# Patient Record
Sex: FEMALE | Race: WHITE | NOT HISPANIC OR LATINO | Employment: OTHER | URBAN - METROPOLITAN AREA
[De-identification: names, ages, dates, MRNs, and addresses within clinical notes are randomized per-mention and may not be internally consistent; named-entity substitution may affect disease eponyms.]

---

## 2017-09-29 ENCOUNTER — HOSPITAL ENCOUNTER (OUTPATIENT)
Dept: CARDIOLOGY | Facility: CLINIC | Age: 82
Discharge: HOME | End: 2017-09-29
Attending: INTERNAL MEDICINE | Admitting: INTERNAL MEDICINE
Payer: MEDICARE

## 2017-09-29 DIAGNOSIS — I10 HYPERTENSION: ICD-10-CM

## 2017-09-29 LAB
ALT SERPL W P-5'-P-CCNC: <5 U/L (ref 5–30)
ANION GAP SERPL CALCULATED.3IONS-SCNC: 9.6 MMOL/L (ref 6–17)
BUN SERPL-MCNC: 20 MG/DL (ref 7–30)
CALCIUM SERPL-MCNC: 8.9 MG/DL (ref 8.5–10.5)
CHLORIDE SERPL-SCNC: 98 MMOL/L (ref 98–107)
CHOLEST SERPL-MCNC: 193 MG/DL
CO2 SERPL-SCNC: 32 MMOL/L (ref 23–29)
CREAT SERPL-MCNC: 1 MG/DL (ref 0.7–1.3)
GFR SERPL CREATININE-BSD FRML MDRD: 53 ML/MIN/1.7M2
GLUCOSE SERPL-MCNC: 94 MG/DL (ref 70–105)
HDLC SERPL-MCNC: 64 MG/DL
LDLC SERPL CALC-MCNC: 115 MG/DL
NONHDLC SERPL-MCNC: 129 MG/DL
POTASSIUM SERPL-SCNC: 4.6 MMOL/L (ref 3.5–5.1)
SODIUM SERPL-SCNC: 135 MMOL/L (ref 136–145)
TRIGL SERPL-MCNC: 70 MG/DL

## 2017-09-29 PROCEDURE — 36415 COLL VENOUS BLD VENIPUNCTURE: CPT | Performed by: INTERNAL MEDICINE

## 2017-09-29 PROCEDURE — 93306 TTE W/DOPPLER COMPLETE: CPT

## 2017-09-29 PROCEDURE — 80061 LIPID PANEL: CPT | Performed by: INTERNAL MEDICINE

## 2017-09-29 PROCEDURE — 84460 ALANINE AMINO (ALT) (SGPT): CPT | Performed by: INTERNAL MEDICINE

## 2017-09-29 PROCEDURE — 80048 BASIC METABOLIC PNL TOTAL CA: CPT | Performed by: INTERNAL MEDICINE

## 2017-09-29 PROCEDURE — 93306 TTE W/DOPPLER COMPLETE: CPT | Mod: 26 | Performed by: INTERNAL MEDICINE

## 2017-10-03 ENCOUNTER — OFFICE VISIT (OUTPATIENT)
Dept: CARDIOLOGY | Facility: CLINIC | Age: 82
End: 2017-10-03
Attending: INTERNAL MEDICINE
Payer: COMMERCIAL

## 2017-10-03 VITALS
BODY MASS INDEX: 22.53 KG/M2 | WEIGHT: 140.2 LBS | DIASTOLIC BLOOD PRESSURE: 76 MMHG | HEART RATE: 65 BPM | HEIGHT: 66 IN | SYSTOLIC BLOOD PRESSURE: 130 MMHG

## 2017-10-03 DIAGNOSIS — I10 ESSENTIAL HYPERTENSION: Primary | ICD-10-CM

## 2017-10-03 PROCEDURE — 99214 OFFICE O/P EST MOD 30 MIN: CPT | Performed by: INTERNAL MEDICINE

## 2017-10-03 NOTE — LETTER
"10/3/2017    Benjamin Chambers MD  [address] [address]    RE: Benita [place]       Dear Colleague,    I had the pleasure of seeing Benita Hubbard in the [facility].    The patient is a very pleasant 86-year-old female who I first met during a hospitalization in 04/2014.  At that time she had presented with chest pain with minimal troponin elevation.  She also was found to be significantly hypertensive.  Hypertension was a new diagnosis for the patient.  She was subsequently treated per ACS protocol due to her troponin elevation.  She was taken to the cath lab and fortunately was not found to have any significant obstructive coronary disease.  She did have a cardiomyopathy with an ejection fraction which was mildly reduced in the 40%-45% range with global hypokinesis.  She was placed on Metoprolol and lisinopril with recent up-titration.  She has done well since her discharge.  She has not had any further reoccurrences up her pain or other symptoms.  She is very active.  She bikes 10 miles a day weather permitting.  She also walks, gardens and does other activities without any difficulty at all.  The patient subsequently presents for a close followup.     Follow up echocardiogram with normalization of her EF.  Doing well, no return of symptoms.      Echo: 9/29/17    The visual ejection fraction is estimated at 50-55%.  \"L\" wave present on mitral inflow signal, this may indicate elevated LV  filling pressure. Other parameters of diastolic dysfxn are non diagnostic on  this study  Inferior wall not well seen and may be hypokinetic  There is mild to moderate (1-2+) tricuspid regurgitation.  The ascending aorta is Borderline dilated.      PAST MEDICAL HISTORY:     1. Nonischemic cardiomyopathy with recent echocardiogram with normalization of ejection fraction, low-normal 50%-55%.     2. Hypertension, new diagnosis.   3. 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   4. History of left hand surgery.   5. History of hysterectomy.      PHYSICAL EXAM:     Blood pressure 130/76, pulse 65, height 1.676 m (5' 6\"), weight 63.6 kg (140 lb 3.2 oz).  General:  The patient is alert and oriented x3, appears younger than stated age.    HEENT:  Oropharynx is clear, no sinus tenderness.    Neck:  No JVP, no lymphadenopathy, no carotid bruits.    Cardiovascular:  Regular rate and rhythm, normal S1 and S2, no murmurs, gallops or rubs.    Lungs:  Clear to auscultation bilaterally.    Abdomen:  Positive bowel sounds, soft, nontender, nondistended.    Extremities:  No clubbing, cyanosis or edema.  Radial pulses equal bilaterally.      ASSESSMENT:   1. Nonischemic cardiomyopathy with ejection fraction low-normal with recent improvement on medical therapy.   2. Cardiac cath in 04/2014 with minimal nonobstructive  coronary disease.   3. Hypertension, new diagnosis.  On dual therapy.   4. History of neuropathy.      RECOMMENDATIONS:     1. Nonischemic cardiomyopathy.  The patient is doing well.  She has had recent echocardiogram which has shown normalization of her ejection fraction.  She is tolerating her lisinopril and metoprolol without any difficulty.  We will continue these medications.   2. Atherosclerotic coronary artery disease.  Minimal obstructive disease was noted.  Probable hypertensive etiology as the cause of her presentation and mild cardiomyopathy which has since improved by most recent assessment.  Blood pressure is well controlled today.  Continue with secondary preventive measures as we are doing.   3. RTC in one year for a routine visit with a pre clinic echocardiogram      Thank you for allowing me to participate in the care of your patient.    Sincerely,     Adalgisa Rosario MD     [facility]    "

## 2017-10-03 NOTE — PROGRESS NOTES
"Cardiology    HISTORY OF PRESENT ILLNESS:  The patient is a very pleasant 86-year-old female who I first met during a hospitalization in 04/2014.  At that time she had presented with chest pain with minimal troponin elevation.  She also was found to be significantly hypertensive.  Hypertension was a new diagnosis for the patient.  She was subsequently treated per ACS protocol due to her troponin elevation.  She was taken to the cath lab and fortunately was not found to have any significant obstructive coronary disease.  She did have a cardiomyopathy with an ejection fraction which was mildly reduced in the 40%-45% range with global hypokinesis.  She was placed on Metoprolol and lisinopril with recent up-titration.  She has done well since her discharge.  She has not had any further reoccurrences up her pain or other symptoms.  She is very active.  She bikes 10 miles a day weather permitting.  She also walks, gardens and does other activities without any difficulty at all.  The patient subsequently presents for a close followup.     Follow up echocardiogram with normalization of her EF.  Doing well, no return of symptoms.      Echo: 9/29/17    The visual ejection fraction is estimated at 50-55%.  \"L\" wave present on mitral inflow signal, this may indicate elevated LV  filling pressure. Other parameters of diastolic dysfxn are non diagnostic on  this study  Inferior wall not well seen and may be hypokinetic  There is mild to moderate (1-2+) tricuspid regurgitation.  The ascending aorta is Borderline dilated.      PAST MEDICAL HISTORY:     1. Nonischemic cardiomyopathy with recent echocardiogram with normalization of ejection fraction, low-normal 50%-55%.     2. Hypertension, new diagnosis.   3. 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   4. History of left hand surgery.   5. History of hysterectomy.      PHYSICAL EXAM:     Blood pressure 130/76, pulse 65, height 1.676 m (5' 6\"), weight 63.6 kg " (140 lb 3.2 oz).  General:  The patient is alert and oriented x3, appears younger than stated age.    HEENT:  Oropharynx is clear, no sinus tenderness.    Neck:  No JVP, no lymphadenopathy, no carotid bruits.    Cardiovascular:  Regular rate and rhythm, normal S1 and S2, no murmurs, gallops or rubs.    Lungs:  Clear to auscultation bilaterally.    Abdomen:  Positive bowel sounds, soft, nontender, nondistended.    Extremities:  No clubbing, cyanosis or edema.  Radial pulses equal bilaterally.      ASSESSMENT:   1. Nonischemic cardiomyopathy with ejection fraction low-normal with recent improvement on medical therapy.   2. Cardiac cath in 04/2014 with minimal nonobstructive  coronary disease.   3. Hypertension, new diagnosis.  On dual therapy.   4. History of neuropathy.      RECOMMENDATIONS:     1. Nonischemic cardiomyopathy.  The patient is doing well.  She has had recent echocardiogram which has shown normalization of her ejection fraction.  She is tolerating her lisinopril and metoprolol without any difficulty.  We will continue these medications.   2. Atherosclerotic coronary artery disease.  Minimal obstructive disease was noted.  Probable hypertensive etiology as the cause of her presentation and mild cardiomyopathy which has since improved by most recent assessment.  Blood pressure is well controlled today.  Continue with secondary preventive measures as we are doing.   3. RTC in one year for a routine visit with a pre clinic echocardiogram        Adalgisa Rosario MD

## 2017-10-03 NOTE — MR AVS SNAPSHOT
"              After Visit Summary   10/3/2017    Benita Hubbard    MRN: [id number]           Patient Information     Date Of Birth          7/24/1931        Visit Information        Provider Department      10/3/2017 9:45 AM Adalgisa Rosario MD [facility] [place]        Today's Diagnoses     Essential hypertension    -  1       Follow-ups after your visit        Who to contact     If you have questions or need follow up information about today's clinic visit or your schedule please contact [facility] directly at [phone].  Normal or non-critical lab and imaging results will be communicated to you by [email or web address], letter or phone within 4 business days after the clinic has received the results. If you do not hear from us within 7 days, please contact the clinic through [employer] or phone. If you have a critical or abnormal lab result, we will notify you by phone as soon as possible.  Submit refill requests through [employer] or call your pharmacy and they will forward the refill request to us. Please allow 3 business days for your refill to be completed.          Additional Information About Your Visit        MyChart Information     [employer] gives you secure access to your electronic health record. If you see a primary care provider, you can also send messages to your care team and make appointments. If you have questions, please call your primary care clinic.  If you do not have a primary care provider, please call [phone] and they will assist you.        Care EveryWhere ID     This is your Care EveryWhere ID. This could be used by other organizations to access your [place] medical records  [id number]        Your Vitals Were     Pulse Height BMI (Body Mass Index)             65 1.676 m (5' 6\") 22.63 kg/m2          Blood Pressure from Last 3 Encounters:   10/03/17 130/76   10/05/16 110/64   09/21/15 126/70    Weight from Last 3 " Encounters:   10/03/17 63.6 kg (140 lb 3.2 oz)   10/05/16 65.8 kg (145 lb)   09/21/15 65.3 kg (144 lb)              Today, you had the following     No orders found for display       Primary Care Provider Office Phone # Fax #    [place] Rusty Chambers [phone]       [place] [address]        Equal Access to Services     RODDY NOLAND : Hadii aad ku hadasho Soomaali, waaxda luqadaha, qaybta kaalmada adeegyada, waxay idiin hayaan adeeg kharash la'nancy . So [facility] [phone].    ATENCIÓN: Si habla español, tiene a starr disposición servicios gratuitos de asistencia lingüística. [facility] [phone].    We comply with applicable federal civil rights laws and [place] laws. We do not discriminate on the basis of race, color, national origin, age, disability, sex, sexual orientation, or gender identity.            Thank you!     Thank you for choosing [facility] [place]  for your care. Our goal is always to provide you with excellent care. Hearing back from our patients is one way we can continue to improve our services. Please take a few minutes to complete the written survey that you may receive in the mail after your visit with us. Thank you!             Your Updated Medication List - Protect others around you: Learn how to safely use, store and throw away your medicines at www.disposemymeds.org.          This list is accurate as of: 10/3/17 10:47 AM.  Always use your most recent med list.                   Brand Name Dispense Instructions for use Diagnosis    alendronate 40 MG Tabs    FOSAMAX     Take 35 mg by mouth once a week        calcium-vitamin D 600-400 MG-UNIT per tablet    CALTRATE     Take 1 tablet by mouth daily        lisinopril 5 MG tablet    PRINIVIL/ZESTRIL    90 tablet    Take 1 tablet (5 mg) by mouth daily    Hypertensive urgency       LYRICA 75 MG capsule   Generic drug:  pregabalin      Take 75 mg  by mouth daily        metoprolol 50 MG 24 hr tablet    TOPROL-XL     Take 50 mg by mouth daily        mometasone 50 MCG/ACT spray    NASONEX     Spray 2 sprays into both nostrils daily as needed        vitamin B complex with vitamin C Tabs tablet      Take 1 tablet by mouth daily        VITAMIN D3 PO      Take 1,000 Units by mouth daily

## 2019-09-29 ENCOUNTER — HEALTH MAINTENANCE LETTER (OUTPATIENT)
Age: 84
End: 2019-09-29

## 2020-03-15 ENCOUNTER — HEALTH MAINTENANCE LETTER (OUTPATIENT)
Age: 85
End: 2020-03-15

## 2021-01-14 ENCOUNTER — HEALTH MAINTENANCE LETTER (OUTPATIENT)
Age: 86
End: 2021-01-14

## 2021-05-09 ENCOUNTER — HEALTH MAINTENANCE LETTER (OUTPATIENT)
Age: 86
End: 2021-05-09

## 2021-09-19 ENCOUNTER — APPOINTMENT (OUTPATIENT)
Dept: CT IMAGING | Facility: CLINIC | Age: 86
End: 2021-09-19
Attending: EMERGENCY MEDICINE
Payer: MEDICARE

## 2021-09-19 ENCOUNTER — HOSPITAL ENCOUNTER (OUTPATIENT)
Facility: CLINIC | Age: 86
Setting detail: OBSERVATION
Discharge: HOME | End: 2021-09-20
Attending: EMERGENCY MEDICINE | Admitting: EMERGENCY MEDICINE
Payer: MEDICARE

## 2021-09-19 DIAGNOSIS — I10 ESSENTIAL HYPERTENSION: Primary | ICD-10-CM

## 2021-09-19 DIAGNOSIS — S09.90XA CLOSED HEAD INJURY, INITIAL ENCOUNTER: ICD-10-CM

## 2021-09-19 DIAGNOSIS — I42.8 CARDIOMYOPATHY, NONISCHEMIC (H): ICD-10-CM

## 2021-09-19 DIAGNOSIS — S22.20XA CLOSED FRACTURE OF STERNUM, UNSPECIFIED PORTION OF STERNUM, INITIAL ENCOUNTER: ICD-10-CM

## 2021-09-19 DIAGNOSIS — S22.040A CLOSED WEDGE COMPRESSION FRACTURE OF T4 VERTEBRA, INITIAL ENCOUNTER (H): ICD-10-CM

## 2021-09-19 LAB
ANION GAP SERPL CALCULATED.3IONS-SCNC: 2 MMOL/L (ref 3–14)
ATRIAL RATE - MUSE: 56 BPM
BASOPHILS # BLD AUTO: 0 10E3/UL (ref 0–0.2)
BASOPHILS NFR BLD AUTO: 0 %
BUN SERPL-MCNC: 21 MG/DL (ref 7–30)
CALCIUM SERPL-MCNC: 8.7 MG/DL (ref 8.5–10.1)
CHLORIDE BLD-SCNC: 99 MMOL/L (ref 94–109)
CO2 SERPL-SCNC: 30 MMOL/L (ref 20–32)
CREAT SERPL-MCNC: 0.98 MG/DL (ref 0.52–1.04)
DIASTOLIC BLOOD PRESSURE - MUSE: NORMAL MMHG
EOSINOPHIL # BLD AUTO: 0.1 10E3/UL (ref 0–0.7)
EOSINOPHIL NFR BLD AUTO: 1 %
ERYTHROCYTE [DISTWIDTH] IN BLOOD BY AUTOMATED COUNT: 13.3 % (ref 10–15)
GFR SERPL CREATININE-BSD FRML MDRD: 51 ML/MIN/1.73M2
GLUCOSE BLD-MCNC: 97 MG/DL (ref 70–99)
HCT VFR BLD AUTO: 39.2 % (ref 35–47)
HGB BLD-MCNC: 12.6 G/DL (ref 11.7–15.7)
IMM GRANULOCYTES # BLD: 0.1 10E3/UL
IMM GRANULOCYTES NFR BLD: 1 %
INTERPRETATION ECG - MUSE: NORMAL
LYMPHOCYTES # BLD AUTO: 0.8 10E3/UL (ref 0.8–5.3)
LYMPHOCYTES NFR BLD AUTO: 11 %
MCH RBC QN AUTO: 29.9 PG (ref 26.5–33)
MCHC RBC AUTO-ENTMCNC: 32.1 G/DL (ref 31.5–36.5)
MCV RBC AUTO: 93 FL (ref 78–100)
MONOCYTES # BLD AUTO: 0.5 10E3/UL (ref 0–1.3)
MONOCYTES NFR BLD AUTO: 7 %
NEUTROPHILS # BLD AUTO: 5.7 10E3/UL (ref 1.6–8.3)
NEUTROPHILS NFR BLD AUTO: 80 %
NRBC # BLD AUTO: 0 10E3/UL
NRBC BLD AUTO-RTO: 0 /100
P AXIS - MUSE: 73 DEGREES
PLATELET # BLD AUTO: 156 10E3/UL (ref 150–450)
POTASSIUM BLD-SCNC: 4.6 MMOL/L (ref 3.4–5.3)
PR INTERVAL - MUSE: 152 MS
QRS DURATION - MUSE: 84 MS
QT - MUSE: 422 MS
QTC - MUSE: 407 MS
R AXIS - MUSE: -18 DEGREES
RBC # BLD AUTO: 4.21 10E6/UL (ref 3.8–5.2)
SARS-COV-2 RNA RESP QL NAA+PROBE: NEGATIVE
SODIUM SERPL-SCNC: 131 MMOL/L (ref 133–144)
SYSTOLIC BLOOD PRESSURE - MUSE: NORMAL MMHG
T AXIS - MUSE: 2 DEGREES
TROPONIN I SERPL-MCNC: <0.015 UG/L (ref 0–0.04)
VENTRICULAR RATE- MUSE: 56 BPM
WBC # BLD AUTO: 7.1 10E3/UL (ref 4–11)

## 2021-09-19 PROCEDURE — 250N000011 HC RX IP 250 OP 636: Performed by: EMERGENCY MEDICINE

## 2021-09-19 PROCEDURE — 87635 SARS-COV-2 COVID-19 AMP PRB: CPT | Performed by: EMERGENCY MEDICINE

## 2021-09-19 PROCEDURE — 36415 COLL VENOUS BLD VENIPUNCTURE: CPT | Performed by: EMERGENCY MEDICINE

## 2021-09-19 PROCEDURE — C9803 HOPD COVID-19 SPEC COLLECT: HCPCS

## 2021-09-19 PROCEDURE — 250N000013 HC RX MED GY IP 250 OP 250 PS 637: Performed by: INTERNAL MEDICINE

## 2021-09-19 PROCEDURE — 250N000011 HC RX IP 250 OP 636: Performed by: INTERNAL MEDICINE

## 2021-09-19 PROCEDURE — 258N000003 HC RX IP 258 OP 636: Performed by: INTERNAL MEDICINE

## 2021-09-19 PROCEDURE — 99285 EMERGENCY DEPT VISIT HI MDM: CPT | Mod: 25

## 2021-09-19 PROCEDURE — 71260 CT THORAX DX C+: CPT | Mod: ME

## 2021-09-19 PROCEDURE — 96376 TX/PRO/DX INJ SAME DRUG ADON: CPT

## 2021-09-19 PROCEDURE — 96374 THER/PROPH/DIAG INJ IV PUSH: CPT

## 2021-09-19 PROCEDURE — 70450 CT HEAD/BRAIN W/O DYE: CPT | Mod: ME

## 2021-09-19 PROCEDURE — 250N000009 HC RX 250: Performed by: EMERGENCY MEDICINE

## 2021-09-19 PROCEDURE — G0378 HOSPITAL OBSERVATION PER HR: HCPCS

## 2021-09-19 PROCEDURE — 84484 ASSAY OF TROPONIN QUANT: CPT | Performed by: EMERGENCY MEDICINE

## 2021-09-19 PROCEDURE — 99220 PR INITIAL OBSERVATION CARE,LEVEL III: CPT | Performed by: INTERNAL MEDICINE

## 2021-09-19 PROCEDURE — 85025 COMPLETE CBC W/AUTO DIFF WBC: CPT | Performed by: EMERGENCY MEDICINE

## 2021-09-19 PROCEDURE — 80048 BASIC METABOLIC PNL TOTAL CA: CPT | Performed by: EMERGENCY MEDICINE

## 2021-09-19 PROCEDURE — 93005 ELECTROCARDIOGRAM TRACING: CPT

## 2021-09-19 PROCEDURE — 72125 CT NECK SPINE W/O DYE: CPT | Mod: ME

## 2021-09-19 RX ORDER — ACETAMINOPHEN 325 MG/1
975 TABLET ORAL EVERY 8 HOURS SCHEDULED
Status: DISCONTINUED | OUTPATIENT
Start: 2021-09-19 | End: 2021-09-20 | Stop reason: HOSPADM

## 2021-09-19 RX ORDER — NALOXONE HYDROCHLORIDE 0.4 MG/ML
0.2 INJECTION, SOLUTION INTRAMUSCULAR; INTRAVENOUS; SUBCUTANEOUS
Status: DISCONTINUED | OUTPATIENT
Start: 2021-09-19 | End: 2021-09-20 | Stop reason: HOSPADM

## 2021-09-19 RX ORDER — POLYETHYLENE GLYCOL 3350 17 G/17G
17 POWDER, FOR SOLUTION ORAL DAILY PRN
Status: DISCONTINUED | OUTPATIENT
Start: 2021-09-19 | End: 2021-09-20 | Stop reason: HOSPADM

## 2021-09-19 RX ORDER — HYDROMORPHONE HCL IN WATER/PF 6 MG/30 ML
0.2 PATIENT CONTROLLED ANALGESIA SYRINGE INTRAVENOUS
Status: DISCONTINUED | OUTPATIENT
Start: 2021-09-19 | End: 2021-09-20 | Stop reason: HOSPADM

## 2021-09-19 RX ORDER — METOPROLOL SUCCINATE 25 MG/1
25 TABLET, EXTENDED RELEASE ORAL DAILY
Status: DISCONTINUED | OUTPATIENT
Start: 2021-09-20 | End: 2021-09-20 | Stop reason: HOSPADM

## 2021-09-19 RX ORDER — PREGABALIN 50 MG/1
50 CAPSULE ORAL EVERY EVENING
COMMUNITY

## 2021-09-19 RX ORDER — LISINOPRIL 5 MG/1
5 TABLET ORAL DAILY
Status: DISCONTINUED | OUTPATIENT
Start: 2021-09-20 | End: 2021-09-20 | Stop reason: HOSPADM

## 2021-09-19 RX ORDER — PREGABALIN 50 MG/1
50 CAPSULE ORAL EVERY EVENING
Status: DISCONTINUED | OUTPATIENT
Start: 2021-09-19 | End: 2021-09-20 | Stop reason: HOSPADM

## 2021-09-19 RX ORDER — PROCHLORPERAZINE MALEATE 5 MG
5 TABLET ORAL EVERY 6 HOURS PRN
Status: DISCONTINUED | OUTPATIENT
Start: 2021-09-19 | End: 2021-09-20 | Stop reason: HOSPADM

## 2021-09-19 RX ORDER — PROCHLORPERAZINE 25 MG
12.5 SUPPOSITORY, RECTAL RECTAL EVERY 12 HOURS PRN
Status: DISCONTINUED | OUTPATIENT
Start: 2021-09-19 | End: 2021-09-20 | Stop reason: HOSPADM

## 2021-09-19 RX ORDER — ONDANSETRON 2 MG/ML
4 INJECTION INTRAMUSCULAR; INTRAVENOUS EVERY 6 HOURS PRN
Status: DISCONTINUED | OUTPATIENT
Start: 2021-09-19 | End: 2021-09-20 | Stop reason: HOSPADM

## 2021-09-19 RX ORDER — HYDROMORPHONE HCL IN WATER/PF 6 MG/30 ML
0.2 PATIENT CONTROLLED ANALGESIA SYRINGE INTRAVENOUS
Status: COMPLETED | OUTPATIENT
Start: 2021-09-19 | End: 2021-09-19

## 2021-09-19 RX ORDER — NALOXONE HYDROCHLORIDE 0.4 MG/ML
0.4 INJECTION, SOLUTION INTRAMUSCULAR; INTRAVENOUS; SUBCUTANEOUS
Status: DISCONTINUED | OUTPATIENT
Start: 2021-09-19 | End: 2021-09-20 | Stop reason: HOSPADM

## 2021-09-19 RX ORDER — IOPAMIDOL 755 MG/ML
80 INJECTION, SOLUTION INTRAVASCULAR ONCE
Status: COMPLETED | OUTPATIENT
Start: 2021-09-19 | End: 2021-09-19

## 2021-09-19 RX ORDER — AMOXICILLIN 250 MG
2 CAPSULE ORAL 2 TIMES DAILY PRN
Status: DISCONTINUED | OUTPATIENT
Start: 2021-09-19 | End: 2021-09-20 | Stop reason: HOSPADM

## 2021-09-19 RX ORDER — ONDANSETRON 4 MG/1
4 TABLET, ORALLY DISINTEGRATING ORAL EVERY 6 HOURS PRN
Status: DISCONTINUED | OUTPATIENT
Start: 2021-09-19 | End: 2021-09-20 | Stop reason: HOSPADM

## 2021-09-19 RX ORDER — LIDOCAINE 4 G/G
1 PATCH TOPICAL
Status: DISCONTINUED | OUTPATIENT
Start: 2021-09-20 | End: 2021-09-20 | Stop reason: HOSPADM

## 2021-09-19 RX ORDER — SODIUM CHLORIDE 9 MG/ML
INJECTION, SOLUTION INTRAVENOUS CONTINUOUS
Status: ACTIVE | OUTPATIENT
Start: 2021-09-19 | End: 2021-09-19

## 2021-09-19 RX ORDER — CHOLECALCIFEROL (VITAMIN D3) 1250 MCG
25 CAPSULE ORAL DAILY
Status: DISCONTINUED | OUTPATIENT
Start: 2021-09-19 | End: 2021-09-19

## 2021-09-19 RX ORDER — AMOXICILLIN 250 MG
1 CAPSULE ORAL 2 TIMES DAILY PRN
Status: DISCONTINUED | OUTPATIENT
Start: 2021-09-19 | End: 2021-09-20 | Stop reason: HOSPADM

## 2021-09-19 RX ORDER — CALCITONIN SALMON 200 [IU]/.09ML
1 SPRAY, METERED NASAL DAILY
Status: DISCONTINUED | OUTPATIENT
Start: 2021-09-19 | End: 2021-09-19

## 2021-09-19 RX ORDER — ALENDRONATE SODIUM 70 MG/1
70 TABLET ORAL
COMMUNITY

## 2021-09-19 RX ORDER — CYCLOBENZAPRINE HCL 5 MG
5 TABLET ORAL EVERY 8 HOURS PRN
Status: DISCONTINUED | OUTPATIENT
Start: 2021-09-19 | End: 2021-09-20 | Stop reason: HOSPADM

## 2021-09-19 RX ADMIN — PREGABALIN 50 MG: 50 CAPSULE ORAL at 19:47

## 2021-09-19 RX ADMIN — SODIUM CHLORIDE: 9 INJECTION, SOLUTION INTRAVENOUS at 15:00

## 2021-09-19 RX ADMIN — HYDROMORPHONE HYDROCHLORIDE 0.2 MG: 0.2 INJECTION, SOLUTION INTRAMUSCULAR; INTRAVENOUS; SUBCUTANEOUS at 15:00

## 2021-09-19 RX ADMIN — OXYCODONE HYDROCHLORIDE 2.5 MG: 5 TABLET ORAL at 19:19

## 2021-09-19 RX ADMIN — SODIUM CHLORIDE 80 ML: 9 INJECTION, SOLUTION INTRAVENOUS at 11:34

## 2021-09-19 RX ADMIN — ACETAMINOPHEN 975 MG: 325 TABLET, FILM COATED ORAL at 23:25

## 2021-09-19 RX ADMIN — HYDROMORPHONE HYDROCHLORIDE 0.2 MG: 0.2 INJECTION, SOLUTION INTRAMUSCULAR; INTRAVENOUS; SUBCUTANEOUS at 10:31

## 2021-09-19 RX ADMIN — IOPAMIDOL 80 ML: 755 INJECTION, SOLUTION INTRAVENOUS at 11:33

## 2021-09-19 RX ADMIN — ACETAMINOPHEN 975 MG: 325 TABLET, FILM COATED ORAL at 18:20

## 2021-09-19 ASSESSMENT — ENCOUNTER SYMPTOMS
NECK PAIN: 0
PALPITATIONS: 0
LIGHT-HEADEDNESS: 1
ARTHRALGIAS: 0
DIZZINESS: 0
ABDOMINAL PAIN: 0
BACK PAIN: 1

## 2021-09-19 ASSESSMENT — MIFFLIN-ST. JEOR: SCORE: 1024.16

## 2021-09-19 NOTE — PROGRESS NOTES
Spine consult received.    Imaging shows T4 compression fracture, suspect chronic based on increased cortical density.    No unstable injuries in T or C spine based on CT imaging.    From spine standpoint:  - No spine precautions necessary  - WBAT   - No bracing indicated  - No intervention planned    Currently completed charting does not demonstrate a neurologic examination of the extremities, though low suspicion that a deficit would be present based on imaging.  If a new motor deficit present please call and will see tonight.    Full consult will follow tomorrow.    Kennedy Pennington MD  Orthopaedic Spine Surgery  [facility]

## 2021-09-19 NOTE — ED NOTES
Bed: ED01  Expected date:   Expected time:   Means of arrival:   Comments:  Sheron 5111 fall rib injury 89f

## 2021-09-19 NOTE — ED NOTES
[facility]  ED Nurse Handoff Report    ED Chief complaint: Fall      ED Diagnosis:   Final diagnoses:   Closed fracture of sternum, unspecified portion of sternum, initial encounter   Closed head injury, initial encounter   Closed wedge compression fracture of T4 vertebra, initial encounter (H)       Code Status: Full Code    Allergies:   Allergies   Allergen Reactions     Cephalexin Other (See Comments)     Dizzy and chest tightness     Ciprofloxacin Other (See Comments)     Neuropathy bottom of feet       Nitrofurantoin Other (See Comments)     Neuropathy bottom of feet     Ofloxacin GI Disturbance     Numbness     Sulfa Drugs      Coated tongue       Patient Story: Pt presents to the ER via EMS with c/o a fall. Per EMS report pt has balance issues at baseline, this am pt stepped wrong and fell backwards. Denies hitting head or a loc.   Focused Assessment: Pt c/o pain with deep breaths and upper back pain. Pt up with Ax1, pt A&Ox4.Pt lives with her  at home. The pt denies any dizziness or palpitations before falling. also denies neck pain.  Results for orders placed or performed during the hospital encounter of 09/19/21   CT Head w/o Contrast     Status: None    Narrative    EXAM: CT HEAD WITHOUT CONTRAST  LOCATION: [facility]  DATE/TIME: 09/19/2021, 11:27 AM    INDICATION: Headache, intracranial hemorrhage suspected.  COMPARISON: None.  TECHNIQUE: Routine CT Head without IV contrast. Multiplanar reformats. Dose reduction techniques were used.    FINDINGS:  INTRACRANIAL CONTENTS: No intracranial hemorrhage, extra-axial collection, or mass effect.  No CT evidence of acute infarct. Mild presumed chronic small vessel ischemic changes. Mild generalized volume loss. No hydrocephalus.     VISUALIZED ORBITS/SINUSES/MASTOIDS: No intraorbital abnormality. No paranasal sinus mucosal disease. No middle ear or mastoid effusion.    BONES/SOFT TISSUES: No acute abnormality.       Impression    IMPRESSION:  1.  No CT evidence for acute intracranial process.  2.  Brain atrophy and presumed chronic microvascular ischemic changes as above.     CT Cervical Spine w/o Contrast     Status: None    Narrative    EXAM: CT CERVICAL SPINE W/O CONTRAST  LOCATION: [facility]  DATE/TIME: 9/19/2021 11:28 AM    INDICATION: Neck trauma (age >= 65y), pain.  COMPARISON: None.  TECHNIQUE: Routine CT Cervical Spine without IV contrast. Multiplanar reformats. Dose reduction techniques were used.    FINDINGS:  VERTEBRA: Mild degenerative anterolisthesis of C4 upon C5, C6 upon C7, and C7 upon T1. Alignment otherwise normal. Vertebral body heights normal. No fractures. Moderate facet arthropathy throughout the cervical spine. Loss of disc height and degenerative   endplate spurring at C5-C6 and C6-C7.     CANAL/FORAMINA: No canal or neural foraminal stenosis.    PARASPINAL: No extraspinal abnormality.      Impression    IMPRESSION:  1.  No fracture or posttraumatic subluxation.  2.  No high-grade spinal canal or neural foraminal stenosis.   Chest CT w IV contrast only, TRAUMA / DISSECTION     Status: None    Narrative    EXAM: CT CHEST W CONTRAST  LOCATION: [facility]  DATE/TIME: 9/19/2021 11:26 AM    INDICATION: Chest pain in right rib injury and pain.  COMPARISON: None.  TECHNIQUE: CT chest with IV contrast. Multiplanar reformats were obtained. Dose reduction techniques were used.  CONTRAST: 80 mL Isovue-370.    FINDINGS:   LUNGS AND PLEURA: Mild basilar predominant atelectasis and scarring / fibrosis. Mild basilar predominant traction bronchiectasis. No consolidation or contusion. No pleural effusion or pneumothorax.    MEDIASTINUM/AXILLAE: No adenopathy. No pericardial effusion. No mediastinal hematoma. Nonaneurysmal aorta without acute traumatic abnormality. Mild pulmonary trunk enlargement at 3.1 cm; correlate for possibility of pulmonary  hypertension.    CORONARY ARTERY CALCIFICATION: Mild.    UPPER ABDOMEN: Nothing acute.    MUSCULOSKELETAL: Motion artifact with upper one third sternal fracture (sagittal series image 54). The more superior sternum demonstrates slight posterior displacement and overlap (1 to 2 mm). Subtle anterior left fifth rib cortical irregularity (series   5, image 214). Age-indeterminate moderate-severe central compression T4, as well as minimal-mild T5 and T6 compression. Perhaps marginal height loss at T7 and T8. No other definitive fracture. Bony demineralization and degenerative changes.      Impression    IMPRESSION:     1.  Acute appearing superior sternal fracture with slight displacement.    2.  Nondisplaced and age-indeterminate anterior left fifth rib fracture.    3.  Additional age-indeterminate vertebral compression fractures, up to moderate-severe at T4 centrally.    4.  No other acute traumatic abnormality. Bony demineralization.    5.  Please see report for complete detail.         Basic metabolic panel     Status: Abnormal   Result Value Ref Range    Sodium 131 (L) 133 - 144 mmol/L    Potassium 4.6 3.4 - 5.3 mmol/L    Chloride 99 94 - 109 mmol/L    Carbon Dioxide (CO2) 30 20 - 32 mmol/L    Anion Gap 2 (L) 3 - 14 mmol/L    Urea Nitrogen 21 7 - 30 mg/dL    Creatinine 0.98 0.52 - 1.04 mg/dL    Calcium 8.7 8.5 - 10.1 mg/dL    Glucose 97 70 - 99 mg/dL    GFR Estimate 51 (L) >60 mL/min/1.73m2   Troponin I     Status: Normal   Result Value Ref Range    Troponin I <0.015 0.000 - 0.045 ug/L   CBC with platelets and differential     Status: Abnormal   Result Value Ref Range    WBC Count 7.1 4.0 - 11.0 10e3/uL    RBC Count 4.21 3.80 - 5.20 10e6/uL    Hemoglobin 12.6 11.7 - 15.7 g/dL    Hematocrit 39.2 35.0 - 47.0 %    MCV 93 78 - 100 fL    MCH 29.9 26.5 - 33.0 pg    MCHC 32.1 31.5 - 36.5 g/dL    RDW 13.3 10.0 - 15.0 %    Platelet Count 156 150 - 450 10e3/uL    % Neutrophils 80 %    % Lymphocytes 11 %    % Monocytes 7 %     % Eosinophils 1 %    % Basophils 0 %    % Immature Granulocytes 1 %    NRBCs per 100 WBC 0 <1 /100    Absolute Neutrophils 5.7 1.6 - 8.3 10e3/uL    Absolute Lymphocytes 0.8 0.8 - 5.3 10e3/uL    Absolute Monocytes 0.5 0.0 - 1.3 10e3/uL    Absolute Eosinophils 0.1 0.0 - 0.7 10e3/uL    Absolute Basophils 0.0 0.0 - 0.2 10e3/uL    Absolute Immature Granulocytes 0.1 (H) <=0.0 10e3/uL    Absolute NRBCs 0.0 10e3/uL   Asymptomatic COVID-19 Virus (Coronavirus) by PCR Nasopharyngeal     Status: Normal    Specimen: Nasopharyngeal; Swab   Result Value Ref Range    SARS CoV2 PCR Negative Negative    Narrative    Testing was performed using the cyndie  SARS-CoV-2 & Influenza A/B Assay on the cyndie  Bette  System.  This test should be ordered for the detection of SARS-COV-2 in individuals who meet SARS-CoV-2 clinical and/or epidemiological criteria. Test performance is unknown in asymptomatic patients.  This test is for in vitro diagnostic use under the FDA EUA for laboratories certified under CLIA to perform moderate and/or high complexity testing. This test has not been FDA cleared or approved.  A negative test does not rule out the presence of PCR inhibitors in the specimen or target RNA in concentration below the limit of detection for the assay. The possibility of a false negative should be considered if the patient's recent exposure or clinical presentation suggests COVID-19.  [facility] are certified under the Clinical Laboratory Improvement Amendments of 1988 (CLIA-88) as qualified to perform moderate and/or high complexity laboratory testing.   EKG 12-lead, tracing only     Status: None   Result Value Ref Range    Systolic Blood Pressure  mmHg    Diastolic Blood Pressure  mmHg    Ventricular Rate 56 BPM    Atrial Rate 56 BPM    AR Interval 152 ms    QRS Duration 84 ms     ms    QTc 407 ms    P Axis 73 degrees    R AXIS -18 degrees    T Axis 2 degrees    Interpretation ECG       Sinus  bradycardia  Low voltage QRS  Cannot rule out Anterior infarct , age undetermined  Abnormal ECG  When compared with ECG of 13-APR-2014 19:23,  Premature ventricular complexes are no longer Present  Vent. rate has decreased BY  38 BPM  Minimal criteria for Anterior infarct are now Present  T wave inversion now evident in Inferior leads  Nonspecific T wave abnormality now evident in Anterior leads  Confirmed by GENERATED REPORT, COMPUTER ([id number]),  JARVIS WINKLER ([id number]) on 9/19/2021 10:37:09 AM     CBC with platelets differential     Status: Abnormal    Narrative    The following orders were created for panel order CBC with platelets differential.  Procedure                               Abnormality         Status                     ---------                               -----------         ------                     CBC with platelets and d...[[id number]]  Abnormal            Final result                 Please view results for these tests on the individual orders.              Treatments and/or interventions provided: Monitor, pain mgt  Patient's response to treatments and/or interventions: Tolerating well    To be done/followed up on inpatient unit:  Pain mgt    Does this patient have any cognitive concerns?: none    Activity level - Baseline/Home:  Independent  Activity Level - Current:   Stand with Assist    Patient's Preferred language: English   Needed?: No    Isolation: None  Infection: Not Applicable  Patient tested for COVID 19 prior to admission: YES  Bariatric?: No    Vital Signs:   Vitals:    09/19/21 1000 09/19/21 1030 09/19/21 1100 09/19/21 1115   BP: 135/68 125/58 109/61    Pulse: 55 54 52    Resp:       Temp:       TempSrc:       SpO2: 92% 94% 93% 96%   Weight:       Height:           Cardiac Rhythm:     Was the PSS-3 completed:   Yes  What interventions are required if any?               Family Comments: Spouse at bedside.  OBS brochure/video discussed/provided to patient/family: Yes               Name of person given brochure if not patient:               Relationship to patient:     For the majority of the shift this patient's behavior was Green.   Behavioral interventions performed were .    ED NURSE PHONE NUMBER: *[id number]

## 2021-09-19 NOTE — H&P
[facility]    History and Physical  Hospitalist       Date of Admission:  9/19/2021    Assessment & Plan     This is a 89-year-old female with history of coronary artery disease, nonischemic cardiomyopathy, neuropathy, osteoporosis, came to the ER with complaint of mechanical fall and back pain.    ASSESSMENT AND PLAN:     1.  Mechanical fall with a T4 compression fracture/sternal fracture and left fifth anterior rib fracture:  This is a 89-year-old female with history of hypertension, neuropathy, coronary artery disease, presented with mechanical fall and back pain.  Right now, her main complaint is upper back pain and tenderness and difficulty taking deep breath.  She has some mild tenderness and pain to the anterior sternum as well.  At this time, we will admit her, start her on some IV fluids, started her on lidocaine patch, Flexeril as needed, oxycodone and Dilaudid available for pain control.  I do not think she will be able to tolerate the TLSO brace at this time because of the sternal fracture.  We will consult spine surgery as well as thoracic surgery to evaluate the patient for spine T4 fracture and sternal fracture, respectively.  I will also start her on calcitonin nasal spray for pain control.  She will continue with Fosamax as outpatient.  She had a detailed workup in the ED, including CT scan of the head, which was negative for any acute intracranial process.  CT cervical spine was negative for any acute fracture or subluxation.  CT chest shows acute appearing superior sternal fracture with slight displacement, nondisplaced, age indeterminate, anterior left rib fracture and vertebral compression fracture, age indeterminate up to moderate to severe at T4.  2.  Hypertension:  She is on metoprolol 50 mg XL and lisinopril 5 mg daily.  Blood pressure has been in good control, slightly on the lower side now.  She seems to be bradycardic at this time so I will decrease the dose of  metoprolol to 25 mg.  3.  Sinus bradycardia:  Most likely because of metoprolol.  Maybe she had orthostatic hypotension when she fell down.  I will cut down the metoprolol dose to 25.  4.  Coronary artery disease has been stable.  Continue metoprolol and lisinopril at this time.  5.  Hyponatremia:  Most likely secondary to SIADH.  We will keep her on IV fluids for a few hours as slightly dehydrated as well at this time.  I will repeat the BMP in the morning.  6.  Neuropathy, on Lyrica 50 mg b.i.d., I will continue with that.    7.  DVT prophylaxis with SCDs.  8.  CODE STATUS:  FULL CODE, as per the patient wishes.     Oliver Grajeda MD  DVT Prophylaxis: Pneumatic Compression Devices  Code Status: Full Code    Disposition: Expected discharge in 1-2 days once stable.    Oliver Grajeda MD    Primary Care Physician   Benjamin Chambers    Chief Complaint   Fall and back pain     History is obtained from the patient    History of Present Illness   Admitted: 09/19/2021    HISTORY OF PRESENT ILLNESS:  This is a 89-year-old female with history of coronary artery disease, nonischemic cardiomyopathy, neuropathy, osteoporosis, came to the ER with complaint of mechanical fall and back pain.    According to the patient, she woke up this morning, was feeling well.  She got out of the bed and was going to the bathroom and the meantime was talking to her , she turned around and lost balance and fell on her back.  She did hit her head, but did not lose consciousness.  As soon as she fell down she started feeling pain in her back and was unable to get up.  Her  was here and he came and helped her get up.  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  Continued to have this back pain and unable to take deep enough breath, so she decided to come to the ER.    The patient denies any dizziness, lightheadedness or loss of consciousness during these episodes.  Denies any headache, fever, chills, nausea, vomiting,  "dysuria, hematuria, constipation, or diarrhea at this time.    Rest of the review of system is negative.  The patient told me that otherwise, she is very active at home and walks daily and does most of the work at home.  Rest of the review of systems are negative at this time.    Past Medical History    I have reviewed this patient's medical history and updated it with pertinent information if needed.   Past Medical History:   Diagnosis Date     Cardiomyopathy due to hypertension (H)      Cardiomyopathy, nonischemic (H)      Coronary artery disease      Diverticulitis of colon      Hypertension      Neuropathy      Osteoporosis      Palmar plantar dysesthesia      Small fiber neuropathy      UTI (lower urinary tract infection)        Past Surgical History   I have reviewed this patient's surgical history and updated it with pertinent information if needed.  Past Surgical History:   Procedure Laterality Date     CORONARY ANGIOGRAPHY ADULT ORDER  4/14/14    normal Coronary arteries     HYSTERECTOMY, PAP NO LONGER INDICATED      ovaries still in place     ORTHOPEDIC SURGERY      Left hand \"hard lumps\" excised     SEPTOPLASTY       SURGICAL HISTORY OF -       vein stripping right leg       Prior to Admission Medications   Prior to Admission Medications   Prescriptions Last Dose Informant Patient Reported? Taking?   Cholecalciferol (VITAMIN D3 PO)  Self Yes No   Sig: Take 1,000 Units by mouth daily   alendronate (FOSAMAX) 40 MG TABS   Yes No   Sig: Take 35 mg by mouth once a week   calcium-vitamin D (CALTRATE) 600-400 MG-UNIT per tablet  Self Yes No   Sig: Take 1 tablet by mouth daily   lisinopril (PRINIVIL,ZESTRIL) 5 MG tablet   No No   Sig: Take 1 tablet (5 mg) by mouth daily   metoprolol (TOPROL-XL) 50 MG 24 hr tablet   Yes No   Sig: Take 50 mg by mouth daily   mometasone (NASONEX) 50 MCG/ACT nasal spray  Self Yes No   Sig: Spray 2 sprays into both nostrils daily as needed   pregabalin (LYRICA) 75 MG capsule   Yes No "   Sig: Take 75 mg by mouth daily   vitamin  B complex with vitamin C (VITAMIN  B COMPLEX) TABS  Self Yes No   Sig: Take 1 tablet by mouth daily      Facility-Administered Medications: None     Allergies   Allergies   Allergen Reactions     Cephalexin Other (See Comments)     Dizzy and chest tightness     Ciprofloxacin Other (See Comments)     Neuropathy bottom of feet       Nitrofurantoin Other (See Comments)     Neuropathy bottom of feet     Ofloxacin GI Disturbance     Numbness     Sulfa Drugs      Coated tongue       Social History   I have reviewed this patient's social history and updated it with pertinent information if needed. Benita Hubbard  reports that she quit smoking about 52 years ago. She has never used smokeless tobacco. She reports current alcohol use. She reports that she does not use drugs.    Family History   I have reviewed this patient's family history and updated it with pertinent information if needed.   Family History   Problem Relation Age of Onset     Cerebrovascular Disease Mother      Cancer - colorectal Father      Heart Disease No family hx of        Review of Systems   CONSTITUTIONAL:  negative  EYES:  negative  HEENT:  negative  RESPIRATORY:  positive for  pleuritic pain  CARDIOVASCULAR:  negative  GASTROINTESTINAL:  negative  GENITOURINARY:  negative  INTEGUMENT/BREAST:  negative  HEMATOLOGIC/LYMPHATIC:  negative  ALLERGIC/IMMUNOLOGIC:  negative  ENDOCRINE:  negative  MUSCULOSKELETAL:  positive for  Upper back pain   NEUROLOGICAL:  negative  BEHAVIOR/PSYCH:  negative    Physical Exam   Temp: 97.9  F (36.6  C) Temp src: Oral BP: 130/64 Pulse: 55   Resp: 20 SpO2: 94 % O2 Device: None (Room air)    Vital Signs with Ranges  Temp:  [97.9  F (36.6  C)] 97.9  F (36.6  C)  Pulse:  [52-60] 55  Resp:  [20] 20  BP: (109-145)/(58-88) 130/64  SpO2:  [92 %-96 %] 94 %  126 lbs 0 oz    Constitutional: Fatigued but cooperative, no apparent distress.  Eyes: Conjunctiva and pupils examined and  normal.  HEENT: Moist mucous membranes, normal dentition.  Respiratory: Clear to auscultation bilaterally, no crackles or wheezing, pain while taking deep breath  Cardiovascular: Regular rate and rhythm, normal S1 and S2, and no murmur noted.  GI: Soft, non-distended, non-tender, normal bowel sounds.  Lymph/Hematologic: No anterior cervical or supraclavicular adenopathy.  Skin: No rashes, no cyanosis, no edema.  Musculoskeletal: No joint swelling erythema or tenderness. T4 spine tenderness no other tenderness point on the spine.   Neurologic: Cranial nerves 2-12 intact, normal strength and sensation.  Psychiatric: Alert, oriented to person, place and time, no obvious anxiety or depression.    Data   Data reviewed today:  I personally reviewed the EKG tracing showing Sinus bradycardia, no acute ischemic changes.    Recent Labs   Lab 09/19/21  1025   WBC 7.1   HGB 12.6   MCV 93      *   POTASSIUM 4.6   CHLORIDE 99   CO2 30   BUN 21   CR 0.98   ANIONGAP 2*   SHORTY 8.7   GLC 97   TROPONIN <0.015       Recent Results (from the past 24 hour(s))   CT Cervical Spine w/o Contrast    Narrative    EXAM: CT CERVICAL SPINE W/O CONTRAST  LOCATION: [facility]  DATE/TIME: 9/19/2021 11:28 AM    INDICATION: Neck trauma (age >= 65y), pain.  COMPARISON: None.  TECHNIQUE: Routine CT Cervical Spine without IV contrast. Multiplanar reformats. Dose reduction techniques were used.    FINDINGS:  VERTEBRA: Mild degenerative anterolisthesis of C4 upon C5, C6 upon C7, and C7 upon T1. Alignment otherwise normal. Vertebral body heights normal. No fractures. Moderate facet arthropathy throughout the cervical spine. Loss of disc height and degenerative   endplate spurring at C5-C6 and C6-C7.     CANAL/FORAMINA: No canal or neural foraminal stenosis.    PARASPINAL: No extraspinal abnormality.      Impression    IMPRESSION:  1.  No fracture or posttraumatic subluxation.  2.  No high-grade spinal canal or neural  foraminal stenosis.   Chest CT w IV contrast only, TRAUMA / DISSECTION    Narrative    EXAM: CT CHEST W CONTRAST  LOCATION: [facility]  DATE/TIME: 9/19/2021 11:26 AM    INDICATION: Chest pain in right rib injury and pain.  COMPARISON: None.  TECHNIQUE: CT chest with IV contrast. Multiplanar reformats were obtained. Dose reduction techniques were used.  CONTRAST: 80 mL Isovue-370.    FINDINGS:   LUNGS AND PLEURA: Mild basilar predominant atelectasis and scarring / fibrosis. Mild basilar predominant traction bronchiectasis. No consolidation or contusion. No pleural effusion or pneumothorax.    MEDIASTINUM/AXILLAE: No adenopathy. No pericardial effusion. No mediastinal hematoma. Nonaneurysmal aorta without acute traumatic abnormality. Mild pulmonary trunk enlargement at 3.1 cm; correlate for possibility of pulmonary hypertension.    CORONARY ARTERY CALCIFICATION: Mild.    UPPER ABDOMEN: Nothing acute.    MUSCULOSKELETAL: Motion artifact with upper one third sternal fracture (sagittal series image 54). The more superior sternum demonstrates slight posterior displacement and overlap (1 to 2 mm). Subtle anterior left fifth rib cortical irregularity (series   5, image 214). Age-indeterminate moderate-severe central compression T4, as well as minimal-mild T5 and T6 compression. Perhaps marginal height loss at T7 and T8. No other definitive fracture. Bony demineralization and degenerative changes.      Impression    IMPRESSION:     1.  Acute appearing superior sternal fracture with slight displacement.    2.  Nondisplaced and age-indeterminate anterior left fifth rib fracture.    3.  Additional age-indeterminate vertebral compression fractures, up to moderate-severe at T4 centrally.    4.  No other acute traumatic abnormality. Bony demineralization.    5.  Please see report for complete detail.         CT Head w/o Contrast    Narrative    EXAM: CT HEAD WITHOUT CONTRAST  LOCATION: [facility]  DATE/TIME: 09/19/2021, 11:27 AM    INDICATION: Headache, intracranial hemorrhage suspected.  COMPARISON: None.  TECHNIQUE: Routine CT Head without IV contrast. Multiplanar reformats. Dose reduction techniques were used.    FINDINGS:  INTRACRANIAL CONTENTS: No intracranial hemorrhage, extra-axial collection, or mass effect.  No CT evidence of acute infarct. Mild presumed chronic small vessel ischemic changes. Mild generalized volume loss. No hydrocephalus.     VISUALIZED ORBITS/SINUSES/MASTOIDS: No intraorbital abnormality. No paranasal sinus mucosal disease. No middle ear or mastoid effusion.    BONES/SOFT TISSUES: No acute abnormality.      Impression    IMPRESSION:  1.  No CT evidence for acute intracranial process.  2.  Brain atrophy and presumed chronic microvascular ischemic changes as above.

## 2021-09-19 NOTE — H&P
Admitted: 09/19/2021    HISTORY OF PRESENT ILLNESS:  This is a 89-year-old female with history of coronary artery disease, nonischemic cardiomyopathy, neuropathy, osteoporosis, came to the ER with complaint of mechanical fall and back pain.    According to the patient, she woke up this morning, was feeling well.  She got out of the bed and was going to the bathroom and the meantime was talking to her , she turned around and lost balance and fell on her back.  She did hit her head, but did not lose consciousness.  As soon as she fell down she started feeling pain in her back and was unable to get up.  Her  was here and he came and helped her get up.  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  Continued to have this back pain and unable to take deep enough breath, so she decided to come to the ER.    The patient denies any dizziness, lightheadedness or loss of consciousness during these episodes.  Denies any headache, fever, chills, nausea, vomiting, dysuria, hematuria, constipation, or diarrhea at this time.    Rest of the review of system is negative.  The patient told me that otherwise, she is very active at home and walks daily and does most of the work at home.  Rest of the review of systems are negative at this time.    ASSESSMENT AND PLAN:     1.  Mechanical fall with a T4 compression fracture/sternal fracture and left fifth anterior rib fracture:  This is a 89-year-old female with history of hypertension, neuropathy, coronary artery disease, presented with mechanical fall and back pain.  Right now, her main complaint is upper back pain and tenderness and difficulty taking deep breath.  She has some mild tenderness and pain to the anterior sternum as well.  At this time, we will admit her, start her on some IV fluids, started her on lidocaine patch, Flexeril as needed, oxycodone and Dilaudid available for pain control.  I do not think she will be able to tolerate the TLSO brace at this time  because of the sternal fracture.  We will consult spine surgery as well as thoracic surgery to evaluate the patient for spine T4 fracture and sternal fracture, respectively.  I will also start her on calcitonin nasal spray for pain control.  She will continue with Fosamax as outpatient.  She had a detailed workup in the ED, including CT scan of the head, which was negative for any acute intracranial process.  CT cervical spine was negative for any acute fracture or subluxation.  CT chest shows acute appearing superior sternal fracture with slight displacement, nondisplaced, age indeterminate, anterior left rib fracture and vertebral compression fracture, age indeterminate up to moderate to severe at T4.  2.  Hypertension:  She is on metoprolol 50 mg XL and lisinopril 5 mg daily.  Blood pressure has been in good control, slightly on the lower side now.  She seems to be bradycardic at this time so I will decrease the dose of metoprolol to 25 mg.  3.  Sinus bradycardia:  Most likely because of metoprolol.  Maybe she had orthostatic hypotension when she fell down.  I will cut down the metoprolol dose to 25.  4.  Coronary artery disease has been stable.  Continue metoprolol and lisinopril at this time.  5.  Hyponatremia:  Most likely secondary to SIADH.  We will keep her on IV fluids for a few hours as slightly dehydrated as well at this time.  I will repeat the BMP in the morning.  6.  Neuropathy, on Lyrica 50 mg b.i.d., I will continue with that.    7.  DVT prophylaxis with SCDs.  8.  CODE STATUS:  FULL CODE, as per the patient wishes.     Oliver Grajeda MD        D: 2021   T: 2021   MT: TYE    Name:     YOKO SU  MRN:      [id number]        Account:     [id number]   :      1931           Admitted:    2021       Document: [id number]

## 2021-09-19 NOTE — ED TRIAGE NOTES
Pt presents to the ER via EMS with c/o a fall. Per EMS report pt has balance issues at baseline, this am pt stepped wrong and fell backwards. Denies hitting head or a loc.  Pt c/o pain with deep breaths and upper back pain.

## 2021-09-19 NOTE — ED NOTES
RECEIVING UNIT ED HANDOFF REVIEW    ED Nurse Handoff Report was reviewed by: Albert Meek RN on September 19, 2021 at 2:04 PM

## 2021-09-19 NOTE — ED PROVIDER NOTES
History   Chief Complaint:  Fall       The history is provided by the patient.      Benita Hubbard is a 89 year old female with history of CAD, NSTEMI, and osteoporosis who presents with her  for evaluation after a fall this morning. She reports getting out of bed to go to the bathroom, but she lost her balance as she was turning and fell backwards. The patient hit her head, but she did not lose consciousness. She reports upper back pain, noting pain along her spine and some more towards the right side. She also notes central chest pain that radiates to her shoulders and is worse with movement or inspiration. The patient has left side pain near her ribs as well. She notes recent history of feeling lightheaded upon standing in the morning. The patient denies any dizziness or palpitations before falling. She also denies fever, chills, abdominal pain, neck pain, or pain in her knees or hips. The patient has no history of anemia, denies dehydration, and has had no recent changes to medications.     Review of Systems   Cardiovascular: Positive for chest pain. Negative for palpitations.   Gastrointestinal: Negative for abdominal pain.   Musculoskeletal: Positive for back pain. Negative for arthralgias (hips, knees) and neck pain.   Neurological: Positive for light-headedness. Negative for dizziness.        (-) loss of consciousness   All other systems reviewed and are negative.        Allergies:  Cephalexin  Ciprofloxacin  Nitrofurantoin  Ofloxacin  Sulfa Drugs    Medications:  Fosamax  Caltrate  Vitamin D3  Lisinopril  Metoprolol  Nasonex   Lyrica  Vitamin B complex    Past Medical History:    Cardiomyopathy, nonischemic  CAD  NSTEMI  Diverticulitis  Hypertension  Neuropathy  Osteoporosis  Palmar plantar dysesthesia  Small fiber neuropathy  UTI     Past Surgical History:    Coronary angiography adult  Hysterectomy  Left hand lumps excised  Septoplasty  Vein stripping right leg     Family History:   "  Cerebrovascular disease  Colorectal cancer    Social History:  The patient presents with her . They came by ambulance.   She lives independently at home with her .     Physical Exam     Patient Vitals for the past 24 hrs:   BP Temp Temp src Pulse Resp SpO2 Height Weight   09/19/21 1425 (!) 141/67 97.3  F (36.3  C) Oral 57 16 98 % -- --   09/19/21 1340 (!) 150/76 -- -- 58 18 96 % -- --   09/19/21 1300 -- -- -- -- -- 93 % -- --   09/19/21 1240 130/64 -- -- 55 16 94 % -- --   09/19/21 1115 -- -- -- -- -- 96 % -- --   09/19/21 1100 109/61 -- -- 52 -- 93 % -- --   09/19/21 1030 125/58 -- -- 54 -- 94 % -- --   09/19/21 1000 135/68 -- -- 55 -- 92 % -- --   09/19/21 0930 -- -- -- -- -- 92 % -- --   09/19/21 0925 (!) 145/88 97.9  F (36.6  C) Oral 60 20 96 % 1.702 m (5' 7\") 57.2 kg (126 lb)       Physical Exam   GENERAL: looks uncomfortable  HEAD: no signs of head trauma  EYES: pupils reactive, extraocular muscles intact, conjunctivae normal  ENT:  mucus membranes moist  NECK:  trachea midline, normal range of motion  RESPIRATORY: no tachypnea, breath sounds clear to auscultation   CVS: normal S1/S2, no murmurs, intact distal pulses  ABDOMEN: soft, nontender, nondistention  MUSCULOSKELETAL: no deformities. Reproducible chest pain to left ribs and mid T-spine, no cervical spine tenderness.   SKIN: warm and dry, no acute rashes or ulceration  NEURO: GCS 15, cranial nerves intact, alert and oriented x3  PSYCH:  Mood/affect normal    Emergency Department Course   ECG  ECG taken at 1027, ECG read at 1030  Sinus bradycardia  Low voltage QRS  Cannot rule out anterior infarct, age undetermined  Abnormal ECG   Compared to prior, dated 4/13/14.  Rate 56 bpm. KS interval 152 ms. QRS duration 84 ms. QT/QTc 422/407 ms. P-R-T axes 73 -18 2.     Imaging:  CT Head w/o Contrast  1.  No CT evidence for acute intracranial process.  2.  Brain atrophy and presumed chronic microvascular ischemic changes as above.  As per radiology. " "    CT Cervical spine w/o Contrast  1.  No fracture or posttraumatic subluxation.  2.  No high-grade spinal canal or neural foraminal stenosis.  As per radiology.     CT Chest w IV contrast only, TRAUMA / DISSECTION  1.  Acute appearing superior sternal fracture with slight displacement.  2.  Nondisplaced and age-indeterminate anterior left fifth rib fracture.  3.  Additional age-indeterminate vertebral compression fractures, up to moderate-severe at T4 centrally.  4.  No other acute traumatic abnormality. Bony demineralization.  5.  Please see report for complete detail.  As per radiology.     Laboratory:     CBC: WBC 7.1, HGB 12.6,   BMP: Sodium 131 (L), Anion Gap 2 (L), GFR 51 (L) o/w WNL (Creatinine 0.98)     Troponin (Collected 1025): <0.015  Asymptomatic COVID19 Virus PCR by nasopharyngeal swab: Negative       Emergency Department Course:    Reviewed:  I reviewed nursing notes, vitals, past medical history and care everywhere    Assessments:  1015 I obtained history and examined the patient as noted above.   1202 I obtained an update from my medical student. The patient reports that her pain is \"okay\" at this time.  1254 I rechecked the patient and explained findings. We discussed admission to the hospital.     Consults:   1230 I obtained an update from my medical student, Geetha, regarding a consult with Dr. Grajeda, hospitalist, regarding the patient's history and presentation. Dr. Grajeda accepted the patient for admission and suggested a call to trauma surgery.  1337 Geetha consulted with Dr. Wilson, surgery, who said he will see the patient once she is in the hospital.    Interventions:  1031 Dilaudid 0.2 mg IV    Disposition:  The patient was admitted to the hospital under the care of Dr. Grajeda.       Impression & Plan   CMS Diagnoses: None    Medical Decision Making:  Presents for with a fall from standing height and chest pain with moving.  CT head neck and chest shows sternal fracture and " age-indeterminate T4 fracture.  Patient clearly has most pain in the anterior sternum.  EKG troponin are normal.  Patient given pain control.  She lives with her  and struggled to get around in bed due to pain.  Spoke with hospitalist regarding admission as well as general surgery from a trauma standpoint.  Discussed with the  and patient patient may need TCU for pain control with her advanced age and getting around.      Diagnosis:    ICD-10-CM    1. Closed fracture of sternum, unspecified portion of sternum, initial encounter  S22.20XA    2. Closed head injury, initial encounter  S09.90XA    3. Closed wedge compression fracture of T4 vertebra, initial encounter (H)  S22.040A        Scribe Disclosure:  I, Yamila Milton, am serving as a scribe at 9:28 AM on 9/19/2021 to document services personally performed by Diaz Mcfarlane MD based on my observations and the provider's statements to me.        Diaz Mcfarlane MD  09/19/21 [id number]

## 2021-09-19 NOTE — PHARMACY-ADMISSION MEDICATION HISTORY
Pharmacy Medication History  Admission medication history interview status for the 9/19/2021  admission is complete. See EPIC admission navigator for prior to admission medications     Location of Interview: Patient room with face mask and eye protection  Medication history sources: Patient, Patient's family/friend (emmanuel at bedside), Surescripts and Pharmacy ([facility] [phone])    Significant changes made to the medication list:  Added:  Changed: alendronate (dose), metoprolol (frequency), pregabalin (dose and frequency)  Removed: cholecalciferol, B complex vitamins    In the past week, patient estimated taking medication this percent of the time: greater than 90%    Additional medication history information:   - stated dose of Lyrica is 75 mg. I checked the most recent refill request and contacted the pharmacy, they have been dispensing the 50 mg capsules 1 per day.     Medication reconciliation completed by provider prior to medication history? No    Time spent in this activity: 30 minutes    Prior to Admission medications    Medication Sig Last Dose Taking? Auth Provider   alendronate (FOSAMAX) 70 MG tablet Take 70 mg by mouth every 7 days Takes on Saturdays 9/18/2021 at Unknown time Yes Unknown, Entered By History   calcium-vitamin D (CALTRATE) 600-400 MG-UNIT per tablet Take 1 tablet by mouth daily 9/18/2021 at Unknown time Yes Unknown, Entered By History   lisinopril (PRINIVIL,ZESTRIL) 5 MG tablet Take 1 tablet (5 mg) by mouth daily 9/18/2021 at Unknown time Yes Adalgisa Rosario MD   metoprolol (TOPROL-XL) 50 MG 24 hr tablet Take 50 mg by mouth every evening  9/18/2021 at Unknown time Yes Reported, Patient   mometasone (NASONEX) 50 MCG/ACT nasal spray Spray 2 sprays into both nostrils daily as needed Past Month at Unknown time Yes Unknown, Entered By History   pregabalin (LYRICA) 50 MG capsule Take 50 mg by mouth every evening 9/18/2021 at Unknown time Yes Unknown, Entered By History          The information provided in this note is only as accurate as the sources available at the time of update(s)

## 2021-09-20 ENCOUNTER — APPOINTMENT (OUTPATIENT)
Dept: OCCUPATIONAL THERAPY | Facility: CLINIC | Age: 86
End: 2021-09-20
Attending: INTERNAL MEDICINE
Payer: MEDICARE

## 2021-09-20 ENCOUNTER — APPOINTMENT (OUTPATIENT)
Dept: PHYSICAL THERAPY | Facility: CLINIC | Age: 86
End: 2021-09-20
Attending: INTERNAL MEDICINE
Payer: MEDICARE

## 2021-09-20 VITALS
HEIGHT: 67 IN | SYSTOLIC BLOOD PRESSURE: 147 MMHG | TEMPERATURE: 97.4 F | DIASTOLIC BLOOD PRESSURE: 77 MMHG | BODY MASS INDEX: 19.84 KG/M2 | RESPIRATION RATE: 18 BRPM | HEART RATE: 60 BPM | WEIGHT: 126.4 LBS | OXYGEN SATURATION: 92 %

## 2021-09-20 LAB
ANION GAP SERPL CALCULATED.3IONS-SCNC: 4 MMOL/L (ref 3–14)
BUN SERPL-MCNC: 19 MG/DL (ref 7–30)
CALCIUM SERPL-MCNC: 8.3 MG/DL (ref 8.5–10.1)
CHLORIDE BLD-SCNC: 100 MMOL/L (ref 94–109)
CO2 SERPL-SCNC: 29 MMOL/L (ref 20–32)
CREAT SERPL-MCNC: 0.85 MG/DL (ref 0.52–1.04)
GFR SERPL CREATININE-BSD FRML MDRD: 61 ML/MIN/1.73M2
GLUCOSE BLD-MCNC: 77 MG/DL (ref 70–99)
POTASSIUM BLD-SCNC: 4.4 MMOL/L (ref 3.4–5.3)
SODIUM SERPL-SCNC: 133 MMOL/L (ref 133–144)

## 2021-09-20 PROCEDURE — 250N000013 HC RX MED GY IP 250 OP 250 PS 637: Performed by: INTERNAL MEDICINE

## 2021-09-20 PROCEDURE — 97530 THERAPEUTIC ACTIVITIES: CPT | Mod: GP | Performed by: PHYSICAL THERAPIST

## 2021-09-20 PROCEDURE — 99217 PR OBSERVATION CARE DISCHARGE: CPT | Performed by: INTERNAL MEDICINE

## 2021-09-20 PROCEDURE — 97161 PT EVAL LOW COMPLEX 20 MIN: CPT | Mod: GP | Performed by: PHYSICAL THERAPIST

## 2021-09-20 PROCEDURE — 97116 GAIT TRAINING THERAPY: CPT | Mod: GP | Performed by: PHYSICAL THERAPIST

## 2021-09-20 PROCEDURE — G0378 HOSPITAL OBSERVATION PER HR: HCPCS

## 2021-09-20 PROCEDURE — 36415 COLL VENOUS BLD VENIPUNCTURE: CPT | Performed by: INTERNAL MEDICINE

## 2021-09-20 PROCEDURE — 80048 BASIC METABOLIC PNL TOTAL CA: CPT | Performed by: INTERNAL MEDICINE

## 2021-09-20 RX ORDER — OXYCODONE HYDROCHLORIDE 5 MG/1
2.5 TABLET ORAL EVERY 6 HOURS PRN
Qty: 10 TABLET | Refills: 0 | Status: SHIPPED | OUTPATIENT
Start: 2021-09-20

## 2021-09-20 RX ORDER — METOPROLOL SUCCINATE 25 MG/1
25 TABLET, EXTENDED RELEASE ORAL EVERY EVENING
Qty: 30 TABLET | Refills: 3 | Status: SHIPPED | OUTPATIENT
Start: 2021-09-20

## 2021-09-20 RX ORDER — LIDOCAINE 4 G/G
1 PATCH TOPICAL EVERY 24 HOURS
Qty: 14 PATCH | Refills: 1 | Status: SHIPPED | OUTPATIENT
Start: 2021-09-20

## 2021-09-20 RX ORDER — ACETAMINOPHEN 325 MG/1
650 TABLET ORAL 2 TIMES DAILY PRN
Qty: 30 TABLET | Refills: 1 | Status: SHIPPED | OUTPATIENT
Start: 2021-09-20

## 2021-09-20 RX ORDER — ACETAMINOPHEN 325 MG/1
975 TABLET ORAL EVERY 8 HOURS
Qty: 126 TABLET | Refills: 1 | Status: SHIPPED | OUTPATIENT
Start: 2021-09-20

## 2021-09-20 RX ADMIN — METOPROLOL SUCCINATE 25 MG: 25 TABLET, EXTENDED RELEASE ORAL at 08:52

## 2021-09-20 RX ADMIN — ACETAMINOPHEN 975 MG: 325 TABLET, FILM COATED ORAL at 06:34

## 2021-09-20 RX ADMIN — LIDOCAINE 1 PATCH: 560 PATCH PERCUTANEOUS; TOPICAL; TRANSDERMAL at 08:49

## 2021-09-20 RX ADMIN — OXYCODONE HYDROCHLORIDE 2.5 MG: 5 TABLET ORAL at 12:28

## 2021-09-20 RX ADMIN — OXYCODONE HYDROCHLORIDE 2.5 MG: 5 TABLET ORAL at 04:12

## 2021-09-20 RX ADMIN — LISINOPRIL 5 MG: 5 TABLET ORAL at 08:52

## 2021-09-20 ASSESSMENT — MIFFLIN-ST. JEOR: SCORE: 1025.98

## 2021-09-20 NOTE — CONSULTS
Care Management Initial Consult    General Information  Assessment completed with: Patient, Spouse or significant other,  (Daniel)  Type of CM/SW Visit: Initial Assessment    Primary Care Provider verified and updated as needed:     Readmission within the last 30 days: no previous admission in last 30 days      Reason for Consult: discharge planning  Advance Care Planning:            Communication Assessment  Patient's communication style: spoken language (English or Bilingual)             Cognitive  Cognitive/Neuro/Behavioral: WDL                      Living Environment:   People in home: spouse   ( Daniel)  Current living Arrangements: house      Able to return to prior arrangements: yes       Family/Social Support:  Care provided by: self, spouse/significant other  Provides care for:    Marital Status:              Description of Support System:           Current Resources:   Patient receiving home care services:       Community Resources:    Equipment currently used at home: none  Supplies currently used at home:      Employment/Financial:  Employment Status:          Financial Concerns:             Lifestyle & Psychosocial Needs:  Social Determinants of Health     Tobacco Use: Medium Risk     Smoking Tobacco Use: Former Smoker     Smokeless Tobacco Use: Never Used   Alcohol Use:      Frequency of Alcohol Consumption:      Average Number of Drinks:      Frequency of Binge Drinking:    Financial Resource Strain:      Difficulty of Paying Living Expenses:    Food Insecurity:      Worried About Running Out of Food in the Last Year:      Ran Out of Food in the Last Year:    Transportation Needs:      Lack of Transportation (Medical):      Lack of Transportation (Non-Medical):    Physical Activity:      Days of Exercise per Week:      Minutes of Exercise per Session:    Stress:      Feeling of Stress :    Social Connections:      Frequency of Communication with Friends and Family:      Frequency of Social  Gatherings with Friends and Family:      Attends Mosque Services:      Active Member of Clubs or Organizations:      Attends Club or Organization Meetings:      Marital Status:    Intimate Partner Violence:      Fear of Current or Ex-Partner:      Emotionally Abused:      Physically Abused:      Sexually Abused:    Depression:      PHQ-2 Score:    Housing Stability:      Unable to Pay for Housing in the Last Year:      Number of Places Lived in the Last Year:      Unstable Housing in the Last Year:        Functional Status:  Prior to admission patient needed assistance:              Mental Health Status:          Chemical Dependency Status:                Values/Beliefs:  Spiritual, Cultural Beliefs, Mosque Practices, Values that affect care:                 Additional Information:  Writer  Met with patient and her husvband bedside.  Patient is currently NOT open to [facility] services.  Advised on [email or web address] and start ratings.   asked that writer send referral to [facility] for home PT.   would  Like to ida able to discharge with Benita soon.  Nurse made aware by steffen.  Referral to [facility] via fax.  REFERRAL [facility]XED TO [facility]  Referral to [facility]; THEY CANNOT START HOME RN FOR 10 DAYS.    Gifty Hopson RN

## 2021-09-20 NOTE — PROGRESS NOTES
Care Management Discharge Note    Discharge Date: 09/20/2021       Discharge Disposition: Home, Home Care    Discharge Services:      Discharge DME:      Discharge Transportation: family or friend will provide    Private pay costs discussed: Not applicable    PAS Confirmation Code:    Patient/family educated on Medicare website which has current facility and service quality ratings:      Education Provided on the Discharge Plan:    Persons Notified of Discharge Plans:   Patient/Family in Agreement with the Plan: yes    Handoff Referral Completed: Yes    Additional Information:  [facility] able to take patient and start in the next few days.  Writer phoned  payal to let him know and provide contact info for [facility]        Gifty Hopson RN

## 2021-09-20 NOTE — PLAN OF CARE
[facility]      OUTPATIENT PHYSICAL THERAPY EVALUATION  PLAN OF TREATMENT FOR OUTPATIENT REHABILITATION  (COMPLETE FOR INITIAL CLAIMS ONLY)  Patient's Last Name, First Name, M.I.  YOB: 1931  Benita Hubbard                        Provider's Name  [facility] Medical Record No.  [id number]                               Onset Date:  09/19/21   Start of Care Date:  09/20/21      Type:     _X_PT   ___OT   ___SLP Medical Diagnosis:  fall with rib, sternum, T4 compression fractures                         PT Diagnosis:  impaired mobility    Visits from SOC:  1   _________________________________________________________________________________  Plan of Treatment/Functional Goals    Planned Interventions: balance training, bed mobility training, gait training, home exercise program, neuromuscular re-education, transfer training, strengthening, stair training     Goals: See Physical Therapy Goals on Care Plan in [employer] electronic health record.    Therapy Frequency: Daily  Predicted Duration of Therapy Intervention: 7 days   _________________________________________________________________________________    I CERTIFY THE NEED FOR THESE SERVICES FURNISHED UNDER        THIS PLAN OF TREATMENT AND WHILE UNDER MY CARE     (Physician co-signature of this document indicates review and certification of the therapy plan).              Certification date from: 09/20/21, Certification date to: 09/27/21    Referring Physician: Oliver Grajeda MD            Initial Assessment        See Physical Therapy evaluation dated 09/20/21 in Epic electronic health record.

## 2021-09-20 NOTE — DISCHARGE INSTRUCTIONS
HOMECARE NOTE:   Your doctor has ordered home care to help you after your hospital stay.  The staff will contact you to schedule your first visit.  This service will be provided by [facility].  If you have any question, or have not received a call within 48 hours of discharge, please call them at [phone] ;  (Press Option #1 for home care, then Option #1 for the ).

## 2021-09-20 NOTE — PROGRESS NOTES
[facility]    Internal Medicine Hospitalist Progress Note  09/20/2021  I evaluated patient on the above date.    Thom Felder Jr., MD  [phone] (p)  Text Page  Vocera        Assessment & Plan New actions/orders today (09/20/2021) are underlined.    Ms. Benita Hubbard is a 89-year-old female with history including hypertension, CAD and neuropathy, who presented 9/19/2021 with a mechanical fall and back pain and found to have suffered an acute sternal fracture, left fifth anterior rib fracture and T4 compression fracture.    Mechanical fall with acute sternal fracture (slightly displaced), left fifth anterior rib fracture (non-displaced) and vertebral compression fractures including moderate-severe T4 compression fracture.  * Head CT 9/19 negative. CT c-spine 9/19 negative for acute fractures. CT chest 9/19 showed acute appearing superior sternal fracture with slight displacement; nondisplaced and age-indeterminate anterior left fifth rib fracture; additional age-indeterminate vertebral compression fractures, up to moderate-severe at T4 centrally; no other acute traumatic abnormality.  * Ortho consulted, and no surgery or bracing indicated, WBAT recommended, no spine precautions necessary. Thoracic Surgery consulted and no surgical indications. Seen by PT.  - Continue scheduled acetaminophen 975 mg q8h, lidocaine 4% patch, PRN oxycodone 2.5 mg q4h, PRN IV hydromorphone 0.2 mg q2h.  - Plan home with PT.  - Appreciate consultant help.    Hypertension (benign essential).  H/o non-ischemic cardiomyopathy.  [PTA: lisinopril 5 mg daily; metoprolol 50 mg qpm.]  * H/o LVEF in 40's in 2014; angiogram negative for significant disease. Echo 9/2017 showed LVEF 50-55%, 1-2+ TR.  * Metoprolol decreased on admit due to sinus bradycardia.  - Continue lisinopril 5 mg daily and metoprolol 25 mg daily.    Sinus bradycardia, suspect due to metoprolol.   Pt radha in 50's on admit. PTA metoprolol decreased as  "above.  - Monitor on decreased metoprolol dose.    Hyponatremia, question nutritional/hypovolemia or SIADH component.  Sodium 131 on admit 9/19. Pt ordered IVF's on admit. Sodium normalized 9/20.  Recent Labs   Lab 09/20/21  0617 09/19/21  1025    131*   - Monitor BMP.    Neuropathy.  - Continue PTA pregabalin 50 mg qpm.    Osteoporosis.  - Resume PTA alendronate and calcium-vitamin D at discharge.    COVID-19 testing.  COVID-19 PCR Results    COVID-19 PCR Results 8/10/21 9/19/21   COVID-19 Virus by PCR (External Result) Negative    SARS CoV2 PCR  Negative      Comments are available for some flowsheets but are not being displayed.         COVID-19 Antibody Results, Testing for Immunity    COVID-19 Antibody Results, Testing for Immunity   No data to display.             Diet: Regular Diet Adult    Prophylaxis: PCD's, ambulation.   Torres Catheter: Not present  Central Lines: None  Code Status: Full Code    Disposition Plan   Expected discharge: today recommended to prior living arrangement.  Entered: Thom Felder MD 09/20/2021, 9:51 AM       Communication.  - I d/w pt's  and son 9/20.    Interval History   Doing much better today.  Slept fairly well.  Pain better today.  Ambulated fairly well with PT.    -Data reviewed today: I reviewed all new labs and imaging over the last 24 hours. I personally reviewed no images or EKG's today.    Physical Exam    , Blood pressure 114/59, pulse 63, temperature 97.4  F (36.3  C), temperature source Oral, resp. rate 16, height 1.702 m (5' 7\"), weight 57.3 kg (126 lb 6.4 oz), SpO2 92 %.  Vitals:    09/19/21 0925 09/20/21 0708   Weight: 57.2 kg (126 lb) 57.3 kg (126 lb 6.4 oz)     Vital Signs with Ranges  Temp:  [97.3  F (36.3  C)-97.8  F (36.6  C)] 97.4  F (36.3  C)  Pulse:  [52-63] 63  Resp:  [16-18] 16  BP: (108-150)/(56-76) 114/59  SpO2:  [92 %-98 %] 92 %  Patient Vitals for the past 24 hrs:   BP Temp Temp src Pulse Resp SpO2 Weight   09/20/21 0852 114/59 -- " -- 63 -- -- --   09/20/21 0725 108/56 97.4  F (36.3  C) Oral 54 16 92 % --   09/20/21 0708 -- -- -- -- -- -- 57.3 kg (126 lb 6.4 oz)   09/20/21 0412 129/67 -- -- 59 16 95 % --   09/19/21 2308 134/66 97.8  F (36.6  C) Oral 57 16 95 % --   09/19/21 2000 -- -- -- -- 16 -- --   09/19/21 1919 -- -- -- -- 16 -- --   09/19/21 1530 -- -- -- -- 16 -- --   09/19/21 1500 -- -- -- -- 16 -- --   09/19/21 1425 (!) 141/67 97.3  F (36.3  C) Oral 57 16 98 % --   09/19/21 1340 (!) 150/76 -- -- 58 18 96 % --   09/19/21 1300 -- -- -- -- -- 93 % --   09/19/21 1240 130/64 -- -- 55 16 94 % --   09/19/21 1115 -- -- -- -- -- 96 % --   09/19/21 1100 109/61 -- -- 52 -- 93 % --   09/19/21 1030 125/58 -- -- 54 -- 94 % --   09/19/21 1000 135/68 -- -- 55 -- 92 % --     I/O's Last 24 hours  No intake/output data recorded.    Constitutional: Awake, alert, pleasant.  Respiratory: Diminished in bases. No crackles or wheezes.  Cardiovascular: RRR, no m/r/g.  GI:   Skin/Integumen:   Other:        Data   Recent Labs   Lab 09/20/21  0617 09/19/21  1025   WBC  --  7.1   HGB  --  12.6   MCV  --  93   PLT  --  156    131*   POTASSIUM 4.4 4.6   CHLORIDE 100 99   CO2 29 30   BUN 19 21   CR 0.85 0.98   ANIONGAP 4 2*   SHORTY 8.3* 8.7   GLC 77 97   TROPONIN  --  <0.015     Recent Labs   Lab Test 09/20/21  0617 09/19/21  1025 09/29/17  0902 10/05/16  1505 12/01/15  0000   GLC 77 97 94 148* 89     No results for input(s): CRP, DD, LDH, FIBR, AGATHA, IL6B in the last 168 hours.      Recent Results (from the past 24 hour(s))   CT Cervical Spine w/o Contrast    Narrative    EXAM: CT CERVICAL SPINE W/O CONTRAST  LOCATION: [facility]  DATE/TIME: 9/19/2021 11:28 AM    INDICATION: Neck trauma (age >= 65y), pain.  COMPARISON: None.  TECHNIQUE: Routine CT Cervical Spine without IV contrast. Multiplanar reformats. Dose reduction techniques were used.    FINDINGS:  VERTEBRA: Mild degenerative anterolisthesis of C4 upon C5, C6 upon C7, and C7 upon  T1. Alignment otherwise normal. Vertebral body heights normal. No fractures. Moderate facet arthropathy throughout the cervical spine. Loss of disc height and degenerative   endplate spurring at C5-C6 and C6-C7.     CANAL/FORAMINA: No canal or neural foraminal stenosis.    PARASPINAL: No extraspinal abnormality.      Impression    IMPRESSION:  1.  No fracture or posttraumatic subluxation.  2.  No high-grade spinal canal or neural foraminal stenosis.   Chest CT w IV contrast only, TRAUMA / DISSECTION    Narrative    EXAM: CT CHEST W CONTRAST  LOCATION: [facility]  DATE/TIME: 9/19/2021 11:26 AM    INDICATION: Chest pain in right rib injury and pain.  COMPARISON: None.  TECHNIQUE: CT chest with IV contrast. Multiplanar reformats were obtained. Dose reduction techniques were used.  CONTRAST: 80 mL Isovue-370.    FINDINGS:   LUNGS AND PLEURA: Mild basilar predominant atelectasis and scarring / fibrosis. Mild basilar predominant traction bronchiectasis. No consolidation or contusion. No pleural effusion or pneumothorax.    MEDIASTINUM/AXILLAE: No adenopathy. No pericardial effusion. No mediastinal hematoma. Nonaneurysmal aorta without acute traumatic abnormality. Mild pulmonary trunk enlargement at 3.1 cm; correlate for possibility of pulmonary hypertension.    CORONARY ARTERY CALCIFICATION: Mild.    UPPER ABDOMEN: Nothing acute.    MUSCULOSKELETAL: Motion artifact with upper one third sternal fracture (sagittal series image 54). The more superior sternum demonstrates slight posterior displacement and overlap (1 to 2 mm). Subtle anterior left fifth rib cortical irregularity (series   5, image 214). Age-indeterminate moderate-severe central compression T4, as well as minimal-mild T5 and T6 compression. Perhaps marginal height loss at T7 and T8. No other definitive fracture. Bony demineralization and degenerative changes.      Impression    IMPRESSION:     1.  Acute appearing superior sternal  fracture with slight displacement.    2.  Nondisplaced and age-indeterminate anterior left fifth rib fracture.    3.  Additional age-indeterminate vertebral compression fractures, up to moderate-severe at T4 centrally.    4.  No other acute traumatic abnormality. Bony demineralization.    5.  Please see report for complete detail.         CT Head w/o Contrast    Narrative    EXAM: CT HEAD WITHOUT CONTRAST  LOCATION: [facility]  DATE/TIME: 09/19/2021, 11:27 AM    INDICATION: Headache, intracranial hemorrhage suspected.  COMPARISON: None.  TECHNIQUE: Routine CT Head without IV contrast. Multiplanar reformats. Dose reduction techniques were used.    FINDINGS:  INTRACRANIAL CONTENTS: No intracranial hemorrhage, extra-axial collection, or mass effect.  No CT evidence of acute infarct. Mild presumed chronic small vessel ischemic changes. Mild generalized volume loss. No hydrocephalus.     VISUALIZED ORBITS/SINUSES/MASTOIDS: No intraorbital abnormality. No paranasal sinus mucosal disease. No middle ear or mastoid effusion.    BONES/SOFT TISSUES: No acute abnormality.      Impression    IMPRESSION:  1.  No CT evidence for acute intracranial process.  2.  Brain atrophy and presumed chronic microvascular ischemic changes as above.         Medications   All medications were reviewed.      acetaminophen  975 mg Oral Q8H GISELLA     lidocaine  1 patch Transdermal Q24H     lidocaine   Transdermal Q8H     lisinopril  5 mg Oral Daily     metoprolol succinate ER  25 mg Oral Daily     pregabalin  50 mg Oral QPM     cyclobenzaprine, HYDROmorphone, melatonin, naloxone **OR** naloxone **OR** naloxone **OR** naloxone, ondansetron **OR** ondansetron, oxyCODONE, polyethylene glycol, prochlorperazine **OR** prochlorperazine **OR** prochlorperazine, senna-docusate **OR** senna-docusate

## 2021-09-20 NOTE — DISCHARGE SUMMARY
[facility]  Discharge Summary        Benita Hubbard MRN# [id number]   YOB: 1931 Age: 89 year old     Date of Admission: 9/19/2021  Date of Discharge: 9/20/2021  Admitting Physician: No admitting provider for patient encounter.  Discharge Physician: Thom Felder MD     Primary Provider: Benjamin Chambers  Primary Care Physician Phone Number: [phone]         Discharge Diagnoses:   1. Mechanical fall with acute sternal fracture (slightly displaced), left fifth anterior rib fracture (non-displaced) and vertebral compression fractures including moderate-severe T4 compression fracture.  2. Sinus bradycardia, suspect due to metoprolol.   3. Hyponatremia, question nutritional/hypovolemia or SIADH component.        Other Chronic Medical Problems:      1. Hypertension (benign essential).  2. Neuropathy.  3. Osteoporosis.  4. H/o non-ischemic cardiomyopathy.       Allergies:         Allergies   Allergen Reactions     Cephalexin Other (See Comments)     Dizzy and chest tightness     Ciprofloxacin Other (See Comments)     Neuropathy bottom of feet       Nitrofurantoin Other (See Comments)     Neuropathy bottom of feet     Ofloxacin GI Disturbance     Numbness     Sulfa Drugs      Coated tongue           Discharge Medications:        Current Discharge Medication List      START taking these medications    Details   !! acetaminophen (TYLENOL) 325 MG tablet Take 3 tablets (975 mg) by mouth every 8 hours  Qty: 126 tablet, Refills: 1    Comments: Maximum 4 grams/day of acetaminophen from all sources.  Associated Diagnoses: Closed fracture of sternum, unspecified portion of sternum, initial encounter; Closed wedge compression fracture of T4 vertebra, initial encounter (H)      !! acetaminophen (TYLENOL) 325 MG tablet Take 2 tablets (650 mg) by mouth 2 times daily as needed for mild pain ; maximum 4 grams/day of acetaminophen from all sources.  Qty: 30 tablet, Refills:  1    Associated Diagnoses: Closed fracture of sternum, unspecified portion of sternum, initial encounter; Closed wedge compression fracture of T4 vertebra, initial encounter (H)      Lidocaine (LIDOCARE) 4 % Patch Place 1 patch onto the skin every 24 hours To prevent lidocaine toxicity, patient should be patch free for 12 hrs daily.  Qty: 14 patch, Refills: 1    Associated Diagnoses: Closed fracture of sternum, unspecified portion of sternum, initial encounter; Closed wedge compression fracture of T4 vertebra, initial encounter (H)      oxyCODONE (ROXICODONE) 5 MG tablet Take 0.5 tablets (2.5 mg) by mouth every 6 hours as needed for moderate to severe pain  Qty: 10 tablet, Refills: 0    Associated Diagnoses: Closed fracture of sternum, unspecified portion of sternum, initial encounter; Closed wedge compression fracture of T4 vertebra, initial encounter (H)       !! - Potential duplicate medications found. Please discuss with provider.      CONTINUE these medications which have CHANGED    Details   metoprolol succinate ER (TOPROL-XL) 25 MG 24 hr tablet Take 1 tablet (25 mg) by mouth every evening  Qty: 30 tablet, Refills: 3    Associated Diagnoses: Essential hypertension; Cardiomyopathy, nonischemic (H)         CONTINUE these medications which have NOT CHANGED    Details   alendronate (FOSAMAX) 70 MG tablet Take 70 mg by mouth every 7 days Takes on Saturdays      calcium-vitamin D (CALTRATE) 600-400 MG-UNIT per tablet Take 1 tablet by mouth daily      lisinopril (PRINIVIL,ZESTRIL) 5 MG tablet Take 1 tablet (5 mg) by mouth daily  Qty: 90 tablet, Refills: 1    Associated Diagnoses: Hypertensive urgency      mometasone (NASONEX) 50 MCG/ACT nasal spray Spray 2 sprays into both nostrils daily as needed      pregabalin (LYRICA) 50 MG capsule Take 50 mg by mouth every evening                 Discharge Instructions and Follow-Up:      Discharge Orders      Home care nursing referral      Home Care PT Referral for Hospital  Discharge      Home Care OT Referral for Hospital Discharge      Reason for your hospital stay    Fall.  Sternal fracture, acute.  Left anterior 5th rib fracture and T4 compression fracture, age indeterminate.     Follow-up and recommended labs and tests    Follow-up with primary care provider, Benjamin Chambers, within 7 days for hospital follow- up.  The following labs/tests are recommended: CBC.     Activity    Your activity upon discharge: activity as tolerated     MD face to face encounter    Documentation of Face to Face and Certification for Home Health Services    I certify that patient: Benita Hubbard is under my care and that I, or a nurse practitioner or physician's assistant working with me, had a face-to-face encounter that meets the physician face-to-face encounter requirements with this patient on: 9/20/2021.    This encounter with the patient was in whole, or in part, for the following medical condition, which is the primary reason for home health care: weakness and deconditioning.    I certify that, based on my findings, the following services are medically necessary home health services: Nursing, Occupational Therapy and Physical Therapy.    My clinical findings support the need for the above services because: Nurse is needed: To assess clinical status after changes in medications or other medical regimen. Occupational Therapy Services are needed to assess and treat cognitive ability and address ADL safety due to impairment in strength and conditioning. Physical Therapy Services are needed to assess and treat the following functional impairments: weakness and deconditioning.    Further, I certify that my clinical findings support that this patient is homebound (i.e. absences from home require considerable and taxing effort and are for medical reasons or [employer] services or infrequently or of short duration when for other reasons) because: Leaving home is medically contraindicated for  the following reason(s): Other physician ordered restriction: weakness and deconditioning.    Based on the above findings. I certify that this patient is confined to the home and needs intermittent skilled nursing care, physical therapy and/or speech therapy.  The patient is under my care, and I have initiated the establishment of the plan of care.  This patient will be followed by a physician who will periodically review the plan of care.  Physician/Provider to provide follow up care: Benjamin Chambers    Attending hospital physician (the Medicare certified [place] provider): Thom Felder MD  Physician Signature: See electronic signature associated with these discharge orders.  Date: 9/20/2021     Diet    Follow this diet upon discharge: Orders Placed This Encounter      Regular Diet Adult             Consultations This Hospital Stay:      SPINE SURGERY ADULT IP CONSULT  THORACIC SURGERY IP CONSULT  CARE MANAGEMENT / SOCIAL WORK IP CONSULT  PHYSICAL THERAPY ADULT IP CONSULT  OCCUPATIONAL THERAPY ADULT IP CONSULT          Admission History:      Please see the H&P by No admitting provider for patient encounter. on 9/19/2021 for complete details. Briefly, Ms. Benita Hubbard is a 89-year-old female with history including hypertension, CAD and neuropathy, who presented 9/19/2021 with a mechanical fall and back pain and found to have suffered an acute sternal fracture, left fifth anterior rib fracture and T4 compression fracture.        Problem Oriented Hospital Course:        Mechanical fall with acute sternal fracture (slightly displaced), left fifth anterior rib fracture (non-displaced) and vertebral compression fractures including moderate-severe T4 compression fracture.  * Head CT 9/19 negative. CT c-spine 9/19 negative for acute fractures. CT chest 9/19 showed acute appearing superior sternal fracture with slight displacement; nondisplaced and age-indeterminate anterior left fifth rib fracture;  additional age-indeterminate vertebral compression fractures, up to moderate-severe at T4 centrally; no other acute traumatic abnormality.  * Ortho consulted, and no surgery or bracing indicated, WBAT recommended, no spine precautions necessary. Thoracic Surgery consulted and no surgical indications. Seen by PT.  - Continue scheduled acetaminophen 975 mg q8h, lidocaine 4% patch, PRN acetaminophen 325 mg BID; PRN oxycodone 2.5 mg q4h.  - Plan home with PT.    Hypertension (benign essential).  H/o non-ischemic cardiomyopathy.  [PTA: lisinopril 5 mg daily; metoprolol 50 mg qpm.]  * H/o LVEF in 40's in 2014; angiogram negative for significant disease. Echo 9/2017 showed LVEF 50-55%, 1-2+ TR.  * Metoprolol decreased on admit due to sinus bradycardia.  - Continue lisinopril 5 mg daily and metoprolol 25 mg daily.    Sinus bradycardia, suspect due to metoprolol.   Pt radha in 50's on admit. PTA metoprolol decreased as above.  - Monitor on decreased metoprolol dose.    Hyponatremia, question nutritional/hypovolemia or SIADH component.  Sodium 131 on admit 9/19. Pt ordered IVF's on admit. Sodium normalized 9/20.  Recent Labs   Lab 09/20/21  0617 09/19/21  1025    131*   - Monitor BMP outpatient.    Neuropathy.  - Continue PTA pregabalin 50 mg qpm.    Osteoporosis.  - Resume PTA alendronate and calcium-vitamin D at discharge.    COVID-19 testing.  COVID-19 PCR Results    COVID-19 PCR Results 8/10/21 9/19/21   COVID-19 Virus by PCR (External Result) Negative    SARS CoV2 PCR  Negative      Comments are available for some flowsheets but are not being displayed.         COVID-19 Antibody Results, Testing for Immunity    COVID-19 Antibody Results, Testing for Immunity   No data to display.                     Pending Results:        Unresulted Labs Ordered in the Past 30 Days of this Admission     No orders found for last 31 day(s).                Discharge Disposition:      Discharged to home with home cares, PT.         Discharge Time:      Less than 30 minutes.        Key Imaging Studies, Lab Findings and Procedures/Surgeries:        Results for orders placed or performed during the hospital encounter of 09/19/21   CT Head w/o Contrast    Narrative    EXAM: CT HEAD WITHOUT CONTRAST  LOCATION: [facility]  DATE/TIME: 09/19/2021, 11:27 AM    INDICATION: Headache, intracranial hemorrhage suspected.  COMPARISON: None.  TECHNIQUE: Routine CT Head without IV contrast. Multiplanar reformats. Dose reduction techniques were used.    FINDINGS:  INTRACRANIAL CONTENTS: No intracranial hemorrhage, extra-axial collection, or mass effect.  No CT evidence of acute infarct. Mild presumed chronic small vessel ischemic changes. Mild generalized volume loss. No hydrocephalus.     VISUALIZED ORBITS/SINUSES/MASTOIDS: No intraorbital abnormality. No paranasal sinus mucosal disease. No middle ear or mastoid effusion.    BONES/SOFT TISSUES: No acute abnormality.      Impression    IMPRESSION:  1.  No CT evidence for acute intracranial process.  2.  Brain atrophy and presumed chronic microvascular ischemic changes as above.     CT Cervical Spine w/o Contrast    Narrative    EXAM: CT CERVICAL SPINE W/O CONTRAST  LOCATION: [facility]  DATE/TIME: 9/19/2021 11:28 AM    INDICATION: Neck trauma (age >= 65y), pain.  COMPARISON: None.  TECHNIQUE: Routine CT Cervical Spine without IV contrast. Multiplanar reformats. Dose reduction techniques were used.    FINDINGS:  VERTEBRA: Mild degenerative anterolisthesis of C4 upon C5, C6 upon C7, and C7 upon T1. Alignment otherwise normal. Vertebral body heights normal. No fractures. Moderate facet arthropathy throughout the cervical spine. Loss of disc height and degenerative   endplate spurring at C5-C6 and C6-C7.     CANAL/FORAMINA: No canal or neural foraminal stenosis.    PARASPINAL: No extraspinal abnormality.      Impression    IMPRESSION:  1.  No fracture or  posttraumatic subluxation.  2.  No high-grade spinal canal or neural foraminal stenosis.   Chest CT w IV contrast only, TRAUMA / DISSECTION    Narrative    EXAM: CT CHEST W CONTRAST  LOCATION: [facility]  DATE/TIME: 9/19/2021 11:26 AM    INDICATION: Chest pain in right rib injury and pain.  COMPARISON: None.  TECHNIQUE: CT chest with IV contrast. Multiplanar reformats were obtained. Dose reduction techniques were used.  CONTRAST: 80 mL Isovue-370.    FINDINGS:   LUNGS AND PLEURA: Mild basilar predominant atelectasis and scarring / fibrosis. Mild basilar predominant traction bronchiectasis. No consolidation or contusion. No pleural effusion or pneumothorax.    MEDIASTINUM/AXILLAE: No adenopathy. No pericardial effusion. No mediastinal hematoma. Nonaneurysmal aorta without acute traumatic abnormality. Mild pulmonary trunk enlargement at 3.1 cm; correlate for possibility of pulmonary hypertension.    CORONARY ARTERY CALCIFICATION: Mild.    UPPER ABDOMEN: Nothing acute.    MUSCULOSKELETAL: Motion artifact with upper one third sternal fracture (sagittal series image 54). The more superior sternum demonstrates slight posterior displacement and overlap (1 to 2 mm). Subtle anterior left fifth rib cortical irregularity (series   5, image 214). Age-indeterminate moderate-severe central compression T4, as well as minimal-mild T5 and T6 compression. Perhaps marginal height loss at T7 and T8. No other definitive fracture. Bony demineralization and degenerative changes.      Impression    IMPRESSION:     1.  Acute appearing superior sternal fracture with slight displacement.    2.  Nondisplaced and age-indeterminate anterior left fifth rib fracture.    3.  Additional age-indeterminate vertebral compression fractures, up to moderate-severe at T4 centrally.    4.  No other acute traumatic abnormality. Bony demineralization.    5.  Please see report for complete detail.

## 2021-09-20 NOTE — PROGRESS NOTES
09/20/21 0915   Quick Adds   Quick Adds Certification   Type of Visit Initial PT Evaluation   Living Environment   People in home spouse   Current Living Arrangements house   Home Accessibility stairs to enter home;stairs within home   Number of Stairs, Main Entrance 1   Stair Railings, Main Entrance none   Number of Stairs, Within Home, Primary 6   Stair Railings, Within Home, Primary railings safe and in good condition   Transportation Anticipated family or friend will provide   Living Environment Comments bed/bath up 6 stairs, pt uses walking sticks when walking outside, no device used at baseline   Self-Care   Usual Activity Tolerance good   Current Activity Tolerance fair   Regular Exercise Yes   Activity/Exercise Type walking   Exercise Amount/Frequency daily   Equipment Currently Used at Home none   Activity/Exercise/Self-Care Comment likes to walk daily and do yard work, pt uses walking sticks when walking and has FWW at home. Spouse present and helpful and walks with cane   Disability/Function   Fall history within last six months yes   Number of times patient has fallen within last six months 1   General Information   Onset of Illness/Injury or Date of Surgery 09/19/21   Referring Physician Oliver Grajeda MD   Patient/Family Therapy Goals Statement (PT) return home    Pertinent History of Current Problem (include personal factors and/or comorbidities that impact the POC)  Mechanical fall with a T4 compression fracture/sternal fracture and left fifth anterior rib fracture:  This is a 89-year-old female with history of hypertension, neuropathy, coronary artery disease, presented with mechanical fall and back pain.  Right now, her main complaint is upper back pain and tenderness and difficulty taking deep breath.   Existing Precautions/Restrictions   (per ortho: no brace needed, WBAT, no spinal precautions )   Cognition   Orientation Status (Cognition) oriented x 4   Affect/Mental Status (Cognition)  WFL   Follows Commands (Cognition) WFL   Pain Assessment   Patient Currently in Pain Yes, see Vital Sign flowsheet  (5/10 pain in sternum)   Bed Mobility   Comment (Bed Mobility) SBA bed mob supine <> sit    Transfers   Transfer Safety Comments Min A with FWW to stand    Gait/Stairs (Locomotion)   [place] Level (Gait) contact guard   Assistive Device (Gait) walker, front-wheeled   Distance in Feet (Required for LE Total Joints) 125'+125'   Comment (Gait/Stairs) 3 stairs with bilateral rails, CGA    Clinical Impression   Criteria for Skilled Therapeutic Intervention yes, treatment indicated   PT Diagnosis (PT) impaired mobility    Influenced by the following impairments pain, weakness, impaired balance    Functional limitations due to impairments fall risk, decreased activity tolerance    Clinical Presentation Stable/Uncomplicated   Clinical Presentation Rationale clinical judgement    Clinical Decision Making (Complexity) low complexity   Therapy Frequency (PT) Daily   Predicted Duration of Therapy Intervention (days/wks) 7 days    Planned Therapy Interventions (PT) balance training;bed mobility training;gait training;home exercise program;neuromuscular re-education;transfer training;strengthening;stair training   Anticipated Equipment Needs at Discharge (PT) walker, rolling   Risk & Benefits of therapy have been explained evaluation/treatment results reviewed;care plan/treatment goals reviewed;risks/benefits reviewed;current/potential barriers reviewed;participants voiced agreement with care plan;participants included;patient;son;spouse/significant other   PT Discharge Planning    PT Discharge Recommendation (DC Rec) home with home care physical therapy   PT Rationale for DC Rec Pt would benefit from continued inpatient PT and home PT to address functional mobility limitations and encourage increased OOB activity s/p T4, rib, and sternum fractures. Patient currently moving with standby and contact guard  assist, does need Min A to stand. Spouse and son present and report they can provide this level of assist and prefer patient to return home with home PT.    PT Brief overview of current status  SBA/CGA bed mob, gait, stairs, Min A sit <> stands withFWW    Therapy Certification   Start of care date 09/20/21   Certification date from 09/20/21   Certification date to 09/27/21   Medical Diagnosis fall with rib, sternum, T4 compression fractures    Total Evaluation Time   Total Evaluation Time (Minutes) 15

## 2021-09-20 NOTE — PLAN OF CARE
OT: Orders received. Chart reviewed and discussed with care team.  Pt discharging home within the hour (prior to OT evaluation) with PT recommending home PT/OT.  Defer discharge recommendations to PT, defer further OT recommendations to home health OT.  Will complete orders.

## 2021-09-20 NOTE — PLAN OF CARE
"VSS. Alert/oriented x 4. Able to express needs. Pain/discomfort managed with scheduled Tylenol, PRN Oxycodone, and heat pack. Saline locked. CMS intact. Patient independent with incentive spirometer. Will continue to monitor.    /67   Pulse 59   Temp 97.8  F (36.6  C) (Oral)   Resp 16   Ht 1.702 m (5' 7\")   Wt 57.2 kg (126 lb)   SpO2 95%   BMI 19.73 kg/m      "

## 2021-09-20 NOTE — PLAN OF CARE
Patient vital signs are at baseline: Yes  Patient able to ambulate as they were prior to admission or with assist devices provided by therapies during their stay:  Yes  Patient MUST void prior to discharge:  Yes  Patient able to tolerate oral intake:  Yes  Pain has adequate pain control using Oral analgesics:  Yes    A/Ox4. VSS on Ra. Up 1-A to BR. Pain is managed by scheduled Tylenol and PRN Oxycodone. Good appetite. CMS intact, dressing CDI. Discharged to home with . AVS given and signed.All belongings checked and sent.

## 2021-09-20 NOTE — PLAN OF CARE
Physical Therapy Discharge Summary    Reason for therapy discharge:    Discharged to home with home therapy.    Progress towards therapy goal(s). See goals on Care Plan in [facility] electronic health record for goal details.  Goals partially met.  Barriers to achieving goals:   discharge from facility.    Therapy recommendation(s):    Continued therapy is recommended.  Rationale/Recommendations:  Recommend home PT for patient to progress towards meeting functional mobility goals.

## 2021-09-20 NOTE — PROGRESS NOTES
THORACIC SURGERY    Reviewed  89 tiffanie, fall    CT chest shows fracture of the manubrium , only very mild displacement    On exam: no bruising, no swelling, minimal discomfort to palpation.  No  instability    No indication for surgical fixation  Pain management    Will see saulo DAVID MD [facility] ONCOLOGY THORACIC SURGERY  CELL:  [phone]  OFFICE: [phone]   nalini all pertinent systems normal

## 2021-09-21 ENCOUNTER — PATIENT OUTREACH (OUTPATIENT)
Dept: CARE COORDINATION | Facility: CLINIC | Age: 86
End: 2021-09-21

## 2021-09-21 DIAGNOSIS — Z71.89 OTHER SPECIFIED COUNSELING: ICD-10-CM

## 2021-09-21 NOTE — PROGRESS NOTES
Clinic Care Coordination Contact  [facility]/Voicemail       Clinical Data: Care Coordinator Outreach  Outreach attempted x 1.  Did speak to pts son earlier today and he asked if I could call back in the afternoon. Called back and the phone kept ringing for over a minute. Also told pts son I would be able to call back tomorrow. Plan:Care Coordinator will try to reach patient again in 1-2 business days.    Caitlin Ellis  Community Health Worker  [facility]  [phone]

## 2021-09-22 NOTE — PROGRESS NOTES
"Clinic Care Coordination Contact  [facility]: Post-Discharge Note  SITUATION                                                      Admission:    Admission Date: 09/19/21   Reason for Admission: Mechanical fall with acute sternal fracture  Discharge:   Discharge Date: 09/20/21  Discharge Diagnosis: Mechanical fall with acute sternal fracture    BACKGROUND                                                      This is a 89-year-old female with history of coronary artery disease, nonischemic cardiomyopathy, neuropathy, osteoporosis, came to the ER with complaint of mechanical fall and back pain.    ASSESSMENT           Discharge Assessment  How are you doing now that you are home?: \"Pain down back but otherwise I'm doing good over here\"  How are your symptoms? (Red Flag symptoms escalate to triage hotline per guidelines): Improved  Do you feel your condition is stable enough to be safe at home until your provider visit?: Yes  Does the patient have their discharge instructions? : Yes  Does the patient have questions regarding their discharge instructions? : No  Were you started on any new medications or were there changes to any of your previous medications? : Yes  Does the patient have all of their medications?: Yes  Do you have questions regarding any of your medications? : No  Do you have all of your needed medical supplies or equipment (DME)?  (i.e. oxygen tank, CPAP, cane, etc.): Yes  Discharge follow-up appointment scheduled within 14 calendar days? : No  Is patient agreeable to assistance with scheduling? : No    Post-op (CHW CTA Only)  If the patient had a surgery or procedure, do they have any questions for a nurse?: No    Post-op (Clinicians Only)  Did the patient have surgery or a procedure: No  Fever: No  Chills: No  Eating & Drinking: eating and drinking without complaints/concerns  PO Intake: regular diet  Bowel Function: normal  Date of last BM: 09/22/21  Urinary Status: voiding without " [facility] [[email or web address]] Family Medicine phone call message- patient requesting a refill:    Full Medication Name: Inhalers    Pharmacy confirmed as   [facility] - [place], [place] - [address]  [address] [address]  Phone: [phone] Fax: [phone]  : Yes    Additional Comments: Patients needs refills on inhalers: fax # [phone]     OK to leave a message on voice mail? Yes    Primary language: Barbadian      needed? Yes    Call taken on May 21, 2018 at 10:58 AM by Jeni Lynne     complaint/concerns        PLAN                                                      Outpatient Plan:  Benjamin Chambers, within 7 days for hospital follow- up.  The following labs/tests are recommended: CBC    No future appointments.      For any urgent concerns, please contact our 24 hour nurse triage line: [phone] ([phone])         Caitlin Ellis  Community Health Worker  [facility]  [phone]

## 2021-09-24 ENCOUNTER — LAB REQUISITION (OUTPATIENT)
Dept: LAB | Facility: CLINIC | Age: 86
End: 2021-09-24
Payer: MEDICARE

## 2021-09-24 DIAGNOSIS — R35.0 FREQUENCY OF MICTURITION: ICD-10-CM

## 2021-09-24 DIAGNOSIS — R39.9 UNSPECIFIED SYMPTOMS AND SIGNS INVOLVING THE GENITOURINARY SYSTEM: ICD-10-CM

## 2021-09-24 LAB
ALBUMIN UR-MCNC: 100 MG/DL
APPEARANCE UR: ABNORMAL
BACTERIA #/AREA URNS HPF: ABNORMAL /HPF
BILIRUB UR QL STRIP: NEGATIVE
COLOR UR AUTO: ABNORMAL
GLUCOSE UR STRIP-MCNC: NEGATIVE MG/DL
HGB UR QL STRIP: ABNORMAL
KETONES UR STRIP-MCNC: NEGATIVE MG/DL
LEUKOCYTE ESTERASE UR QL STRIP: ABNORMAL
MUCOUS THREADS #/AREA URNS LPF: PRESENT /LPF
NITRATE UR QL: POSITIVE
PH UR STRIP: 6.5 [PH] (ref 5–7)
RBC URINE: 14 /HPF
SP GR UR STRIP: 1.01 (ref 1–1.03)
SQUAMOUS EPITHELIAL: 3 /HPF
UROBILINOGEN UR STRIP-MCNC: NORMAL MG/DL
WBC CLUMPS #/AREA URNS HPF: PRESENT /HPF
WBC URINE: >182 /HPF

## 2021-09-24 PROCEDURE — 87086 URINE CULTURE/COLONY COUNT: CPT | Performed by: INTERNAL MEDICINE

## 2021-09-24 PROCEDURE — 81001 URINALYSIS AUTO W/SCOPE: CPT | Performed by: INTERNAL MEDICINE

## 2021-09-25 LAB — BACTERIA UR CULT: ABNORMAL

## 2022-06-05 ENCOUNTER — HEALTH MAINTENANCE LETTER (OUTPATIENT)
Age: 87
End: 2022-06-05

## 2022-10-15 ENCOUNTER — HEALTH MAINTENANCE LETTER (OUTPATIENT)
Age: 87
End: 2022-10-15

## 2023-06-11 ENCOUNTER — HEALTH MAINTENANCE LETTER (OUTPATIENT)
Age: 88
End: 2023-06-11

## 2024-08-04 ENCOUNTER — HEALTH MAINTENANCE LETTER (OUTPATIENT)
Age: 89
End: 2024-08-04

## 2024-10-06 ENCOUNTER — HOSPITAL ENCOUNTER (INPATIENT)
Facility: CLINIC | Age: 89
LOS: 9 days | Discharge: NURSING, REHAB OR LONG-TERM CARE | DRG: 521 | End: 2024-10-15
Attending: EMERGENCY MEDICINE | Admitting: HOSPITALIST
Payer: MEDICARE

## 2024-10-06 ENCOUNTER — APPOINTMENT (OUTPATIENT)
Dept: CT IMAGING | Facility: CLINIC | Age: 89
DRG: 521 | End: 2024-10-06
Attending: EMERGENCY MEDICINE
Payer: MEDICARE

## 2024-10-06 ENCOUNTER — APPOINTMENT (OUTPATIENT)
Dept: GENERAL RADIOLOGY | Facility: CLINIC | Age: 89
DRG: 521 | End: 2024-10-06
Attending: EMERGENCY MEDICINE
Payer: MEDICARE

## 2024-10-06 DIAGNOSIS — N17.9 AKI (ACUTE KIDNEY INJURY) (H): ICD-10-CM

## 2024-10-06 DIAGNOSIS — I42.8 CARDIOMYOPATHY, NONISCHEMIC (H): ICD-10-CM

## 2024-10-06 DIAGNOSIS — I48.0 PAROXYSMAL ATRIAL FIBRILLATION (H): ICD-10-CM

## 2024-10-06 DIAGNOSIS — S72.002A HIP FRACTURE, LEFT, CLOSED, INITIAL ENCOUNTER (H): ICD-10-CM

## 2024-10-06 DIAGNOSIS — S72.002A CLOSED DISPLACED FRACTURE OF LEFT FEMORAL NECK (H): Primary | ICD-10-CM

## 2024-10-06 DIAGNOSIS — G62.9 NEUROPATHY: ICD-10-CM

## 2024-10-06 DIAGNOSIS — M62.82 NON-TRAUMATIC RHABDOMYOLYSIS: ICD-10-CM

## 2024-10-06 LAB
ABO/RH(D): NORMAL
ALBUMIN SERPL BCG-MCNC: 3.9 G/DL (ref 3.5–5.2)
ALP SERPL-CCNC: 63 U/L (ref 40–150)
ALT SERPL W P-5'-P-CCNC: 28 U/L (ref 0–50)
ANION GAP SERPL CALCULATED.3IONS-SCNC: 15 MMOL/L (ref 7–15)
ANION GAP SERPL CALCULATED.3IONS-SCNC: 19 MMOL/L (ref 7–15)
ANTIBODY SCREEN: NEGATIVE
AST SERPL W P-5'-P-CCNC: ABNORMAL U/L
ATRIAL RATE - MUSE: 74 BPM
BASOPHILS # BLD AUTO: 0 10E3/UL (ref 0–0.2)
BASOPHILS NFR BLD AUTO: 0 %
BILIRUB SERPL-MCNC: 0.5 MG/DL
BUN SERPL-MCNC: 41.6 MG/DL (ref 8–23)
BUN SERPL-MCNC: 44.9 MG/DL (ref 8–23)
CALCIUM SERPL-MCNC: 9.1 MG/DL (ref 8.8–10.4)
CALCIUM SERPL-MCNC: 9.4 MG/DL (ref 8.8–10.4)
CHLORIDE SERPL-SCNC: 96 MMOL/L (ref 98–107)
CHLORIDE SERPL-SCNC: 96 MMOL/L (ref 98–107)
CK SERPL-CCNC: 748 U/L (ref 26–192)
CREAT SERPL-MCNC: 0.96 MG/DL (ref 0.51–0.95)
CREAT SERPL-MCNC: 0.97 MG/DL (ref 0.51–0.95)
DIASTOLIC BLOOD PRESSURE - MUSE: NORMAL MMHG
EGFRCR SERPLBLD CKD-EPI 2021: 54 ML/MIN/1.73M2
EGFRCR SERPLBLD CKD-EPI 2021: 55 ML/MIN/1.73M2
EOSINOPHIL # BLD AUTO: 0 10E3/UL (ref 0–0.7)
EOSINOPHIL NFR BLD AUTO: 0 %
ERYTHROCYTE [DISTWIDTH] IN BLOOD BY AUTOMATED COUNT: 13.5 % (ref 10–15)
GLUCOSE SERPL-MCNC: 122 MG/DL (ref 70–99)
GLUCOSE SERPL-MCNC: 163 MG/DL (ref 70–99)
HCO3 SERPL-SCNC: 19 MMOL/L (ref 22–29)
HCO3 SERPL-SCNC: 22 MMOL/L (ref 22–29)
HCT VFR BLD AUTO: 42.5 % (ref 35–47)
HGB BLD-MCNC: 13.9 G/DL (ref 11.7–15.7)
HOLD SPECIMEN: 0
IMM GRANULOCYTES # BLD: 0 10E3/UL
IMM GRANULOCYTES NFR BLD: 0 %
INTERPRETATION ECG - MUSE: NORMAL
LYMPHOCYTES # BLD AUTO: 0.6 10E3/UL (ref 0.8–5.3)
LYMPHOCYTES NFR BLD AUTO: 6 %
MCH RBC QN AUTO: 30.7 PG (ref 26.5–33)
MCHC RBC AUTO-ENTMCNC: 32.7 G/DL (ref 31.5–36.5)
MCV RBC AUTO: 94 FL (ref 78–100)
MONOCYTES # BLD AUTO: 0.6 10E3/UL (ref 0–1.3)
MONOCYTES NFR BLD AUTO: 5 %
NEUTROPHILS # BLD AUTO: 9.3 10E3/UL (ref 1.6–8.3)
NEUTROPHILS NFR BLD AUTO: 88 %
NRBC # BLD AUTO: 0 10E3/UL
NRBC BLD AUTO-RTO: 0 /100
P AXIS - MUSE: 18 DEGREES
PLATELET # BLD AUTO: 112 10E3/UL (ref 150–450)
POTASSIUM SERPL-SCNC: 5.3 MMOL/L (ref 3.4–5.3)
POTASSIUM SERPL-SCNC: 5.4 MMOL/L (ref 3.4–5.3)
PR INTERVAL - MUSE: 130 MS
PROT SERPL-MCNC: 6.5 G/DL (ref 6.4–8.3)
QRS DURATION - MUSE: 94 MS
QT - MUSE: 434 MS
QTC - MUSE: 481 MS
R AXIS - MUSE: -15 DEGREES
RBC # BLD AUTO: 4.53 10E6/UL (ref 3.8–5.2)
SODIUM SERPL-SCNC: 133 MMOL/L (ref 135–145)
SODIUM SERPL-SCNC: 134 MMOL/L (ref 135–145)
SPECIMEN EXPIRATION DATE: NORMAL
SYSTOLIC BLOOD PRESSURE - MUSE: NORMAL MMHG
T AXIS - MUSE: 15 DEGREES
VENTRICULAR RATE- MUSE: 74 BPM
VIT D+METAB SERPL-MCNC: 49 NG/ML (ref 20–50)
WBC # BLD AUTO: 10.6 10E3/UL (ref 4–11)

## 2024-10-06 PROCEDURE — 96360 HYDRATION IV INFUSION INIT: CPT

## 2024-10-06 PROCEDURE — 73030 X-RAY EXAM OF SHOULDER: CPT | Mod: LT

## 2024-10-06 PROCEDURE — 99285 EMERGENCY DEPT VISIT HI MDM: CPT | Mod: 25

## 2024-10-06 PROCEDURE — G1010 CDSM STANSON: HCPCS

## 2024-10-06 PROCEDURE — 36415 COLL VENOUS BLD VENIPUNCTURE: CPT | Performed by: HOSPITALIST

## 2024-10-06 PROCEDURE — 258N000003 HC RX IP 258 OP 636: Performed by: EMERGENCY MEDICINE

## 2024-10-06 PROCEDURE — 99223 1ST HOSP IP/OBS HIGH 75: CPT | Mod: AI | Performed by: HOSPITALIST

## 2024-10-06 PROCEDURE — 71045 X-RAY EXAM CHEST 1 VIEW: CPT

## 2024-10-06 PROCEDURE — 82550 ASSAY OF CK (CPK): CPT | Performed by: EMERGENCY MEDICINE

## 2024-10-06 PROCEDURE — 82306 VITAMIN D 25 HYDROXY: CPT | Performed by: STUDENT IN AN ORGANIZED HEALTH CARE EDUCATION/TRAINING PROGRAM

## 2024-10-06 PROCEDURE — 73502 X-RAY EXAM HIP UNI 2-3 VIEWS: CPT

## 2024-10-06 PROCEDURE — 120N000001 HC R&B MED SURG/OB

## 2024-10-06 PROCEDURE — 80048 BASIC METABOLIC PNL TOTAL CA: CPT | Performed by: HOSPITALIST

## 2024-10-06 PROCEDURE — 250N000011 HC RX IP 250 OP 636: Performed by: STUDENT IN AN ORGANIZED HEALTH CARE EDUCATION/TRAINING PROGRAM

## 2024-10-06 PROCEDURE — 258N000003 HC RX IP 258 OP 636: Performed by: HOSPITALIST

## 2024-10-06 PROCEDURE — 36415 COLL VENOUS BLD VENIPUNCTURE: CPT | Performed by: EMERGENCY MEDICINE

## 2024-10-06 PROCEDURE — 250N000013 HC RX MED GY IP 250 OP 250 PS 637: Performed by: HOSPITALIST

## 2024-10-06 PROCEDURE — 86901 BLOOD TYPING SEROLOGIC RH(D): CPT | Performed by: EMERGENCY MEDICINE

## 2024-10-06 PROCEDURE — 84155 ASSAY OF PROTEIN SERUM: CPT | Performed by: EMERGENCY MEDICINE

## 2024-10-06 PROCEDURE — 85025 COMPLETE CBC W/AUTO DIFF WBC: CPT | Performed by: EMERGENCY MEDICINE

## 2024-10-06 PROCEDURE — 86900 BLOOD TYPING SEROLOGIC ABO: CPT | Performed by: EMERGENCY MEDICINE

## 2024-10-06 RX ORDER — ACETAMINOPHEN 325 MG/1
650 TABLET ORAL EVERY 4 HOURS PRN
Status: DISCONTINUED | OUTPATIENT
Start: 2024-10-06 | End: 2024-10-08

## 2024-10-06 RX ORDER — FLUTICASONE PROPIONATE 50 MCG
2 SPRAY, SUSPENSION (ML) NASAL DAILY PRN
Status: DISCONTINUED | OUTPATIENT
Start: 2024-10-06 | End: 2024-10-15 | Stop reason: HOSPADM

## 2024-10-06 RX ORDER — METOPROLOL SUCCINATE 25 MG/1
25 TABLET, EXTENDED RELEASE ORAL EVERY EVENING
Status: DISCONTINUED | OUTPATIENT
Start: 2024-10-06 | End: 2024-10-07

## 2024-10-06 RX ORDER — OXYCODONE HYDROCHLORIDE 5 MG/1
5 TABLET ORAL EVERY 4 HOURS PRN
Status: DISCONTINUED | OUTPATIENT
Start: 2024-10-06 | End: 2024-10-07

## 2024-10-06 RX ORDER — LIDOCAINE 40 MG/G
CREAM TOPICAL
Status: DISCONTINUED | OUTPATIENT
Start: 2024-10-06 | End: 2024-10-09

## 2024-10-06 RX ORDER — LISINOPRIL 5 MG/1
5 TABLET ORAL DAILY
Status: DISCONTINUED | OUTPATIENT
Start: 2024-10-06 | End: 2024-10-07

## 2024-10-06 RX ORDER — NALOXONE HYDROCHLORIDE 0.4 MG/ML
0.4 INJECTION, SOLUTION INTRAMUSCULAR; INTRAVENOUS; SUBCUTANEOUS
Status: DISCONTINUED | OUTPATIENT
Start: 2024-10-06 | End: 2024-10-15 | Stop reason: HOSPADM

## 2024-10-06 RX ORDER — ONDANSETRON 4 MG/1
4 TABLET, ORALLY DISINTEGRATING ORAL EVERY 6 HOURS PRN
Status: DISCONTINUED | OUTPATIENT
Start: 2024-10-06 | End: 2024-10-08

## 2024-10-06 RX ORDER — ACETAMINOPHEN 650 MG/1
650 SUPPOSITORY RECTAL EVERY 4 HOURS PRN
Status: DISCONTINUED | OUTPATIENT
Start: 2024-10-06 | End: 2024-10-08

## 2024-10-06 RX ORDER — AMOXICILLIN 250 MG
1 CAPSULE ORAL 2 TIMES DAILY PRN
Status: DISCONTINUED | OUTPATIENT
Start: 2024-10-06 | End: 2024-10-15 | Stop reason: HOSPADM

## 2024-10-06 RX ORDER — HYDROMORPHONE HCL IN WATER/PF 6 MG/30 ML
0.2 PATIENT CONTROLLED ANALGESIA SYRINGE INTRAVENOUS
Status: DISCONTINUED | OUTPATIENT
Start: 2024-10-06 | End: 2024-10-07

## 2024-10-06 RX ORDER — METHOCARBAMOL 500 MG/1
250 TABLET ORAL 4 TIMES DAILY PRN
Status: DISCONTINUED | OUTPATIENT
Start: 2024-10-06 | End: 2024-10-15 | Stop reason: HOSPADM

## 2024-10-06 RX ORDER — NALOXONE HYDROCHLORIDE 0.4 MG/ML
0.2 INJECTION, SOLUTION INTRAMUSCULAR; INTRAVENOUS; SUBCUTANEOUS
Status: DISCONTINUED | OUTPATIENT
Start: 2024-10-06 | End: 2024-10-15 | Stop reason: HOSPADM

## 2024-10-06 RX ORDER — DEXTROSE MONOHYDRATE AND SODIUM CHLORIDE 5; .9 G/100ML; G/100ML
INJECTION, SOLUTION INTRAVENOUS CONTINUOUS
Status: DISCONTINUED | OUTPATIENT
Start: 2024-10-06 | End: 2024-10-09

## 2024-10-06 RX ORDER — PREGABALIN 50 MG/1
50 CAPSULE ORAL EVERY EVENING
Status: DISCONTINUED | OUTPATIENT
Start: 2024-10-06 | End: 2024-10-07

## 2024-10-06 RX ORDER — ONDANSETRON 2 MG/ML
4 INJECTION INTRAMUSCULAR; INTRAVENOUS EVERY 6 HOURS PRN
Status: DISCONTINUED | OUTPATIENT
Start: 2024-10-06 | End: 2024-10-08

## 2024-10-06 RX ORDER — AMOXICILLIN 250 MG
2 CAPSULE ORAL 2 TIMES DAILY PRN
Status: DISCONTINUED | OUTPATIENT
Start: 2024-10-06 | End: 2024-10-15 | Stop reason: HOSPADM

## 2024-10-06 RX ORDER — ACETAMINOPHEN 325 MG/1
975 TABLET ORAL EVERY 8 HOURS PRN
Status: DISCONTINUED | OUTPATIENT
Start: 2024-10-06 | End: 2024-10-06

## 2024-10-06 RX ORDER — CALCIUM CARBONATE 500 MG/1
1000 TABLET, CHEWABLE ORAL 4 TIMES DAILY PRN
Status: DISCONTINUED | OUTPATIENT
Start: 2024-10-06 | End: 2024-10-15 | Stop reason: HOSPADM

## 2024-10-06 RX ADMIN — DEXTROSE AND SODIUM CHLORIDE: 5; 900 INJECTION, SOLUTION INTRAVENOUS at 19:19

## 2024-10-06 RX ADMIN — METOPROLOL SUCCINATE 25 MG: 25 TABLET, EXTENDED RELEASE ORAL at 19:19

## 2024-10-06 RX ADMIN — SODIUM CHLORIDE 500 ML: 9 INJECTION, SOLUTION INTRAVENOUS at 11:58

## 2024-10-06 RX ADMIN — SODIUM CHLORIDE 1000 ML: 9 INJECTION, SOLUTION INTRAVENOUS at 14:02

## 2024-10-06 RX ADMIN — PREGABALIN 50 MG: 50 CAPSULE ORAL at 19:19

## 2024-10-06 RX ADMIN — HYDROMORPHONE HYDROCHLORIDE 0.2 MG: 0.2 INJECTION, SOLUTION INTRAMUSCULAR; INTRAVENOUS; SUBCUTANEOUS at 17:21

## 2024-10-06 ASSESSMENT — COLUMBIA-SUICIDE SEVERITY RATING SCALE - C-SSRS
2. HAVE YOU ACTUALLY HAD ANY THOUGHTS OF KILLING YOURSELF IN THE PAST MONTH?: NO
6. HAVE YOU EVER DONE ANYTHING, STARTED TO DO ANYTHING, OR PREPARED TO DO ANYTHING TO END YOUR LIFE?: NO
1. IN THE PAST MONTH, HAVE YOU WISHED YOU WERE DEAD OR WISHED YOU COULD GO TO SLEEP AND NOT WAKE UP?: NO

## 2024-10-06 ASSESSMENT — ACTIVITIES OF DAILY LIVING (ADL)
ADLS_ACUITY_SCORE: 40
ADLS_ACUITY_SCORE: 38
ADLS_ACUITY_SCORE: 38
ADLS_ACUITY_SCORE: 40
ADLS_ACUITY_SCORE: 38

## 2024-10-06 NOTE — ED TRIAGE NOTES
Pt BIBA from home where she lived independently in a senior living apartment. Pt had a mechanical fall sometime last night. A&Ox4. Complains of left shoulder pain and left hip pain. Left leg shortened and externally rotated. No LOC, No thinners. Bruise to left chin. C-collar in place by EMS. . Given 50 mcg fentanyl by ems.      Triage Assessment (Adult)       Row Name 10/06/24 1122          Triage Assessment    Airway WDL WDL        Respiratory WDL    Respiratory WDL WDL        Skin Circulation/Temperature WDL    Skin Circulation/Temperature WDL WDL        Cardiac WDL    Cardiac WDL WDL        Peripheral/Neurovascular WDL    Peripheral Neurovascular WDL WDL        Cognitive/Neuro/Behavioral WDL    Cognitive/Neuro/Behavioral WDL WDL

## 2024-10-06 NOTE — H&P
[facility]    History and Physical - Hospitalist Service       Date of Admission:  10/6/2024    Assessment & Plan      Benita Hubbard is a 89 year old female with h/o  HTN, non ischemic CMP,  was found on the floor and was brought to the ER by EMS.  She is noted to have left femoral neck fracture and admitted on 10/6/2024 for further management.      Suspected fall, unclear etiology  Left femoral neck fracture, acute, displaced  Unclear etiology of fall.  She was found on floor.  Patient does not remember when she fell, and also the mechanism.  A bruise in her chin was noted 3 days ago by son, patient did not remember falling then as well.  Uses walker mostly for mobility.  Suspect mechanical fall.  No history of seizure.  Denies dizziness or chest pain.  EKG in ER sinus rhythm, no tachycardia, no ischemic changes.   Multiple imaging studies including left shoulder x-ray, chest x-ray, pelvis and hip x-ray, CT head and CT C-spine completed, noted left femoral neck fracture with displacement.      -Admit to inpatient  -Orthopedic surgery consulted, plan is OR for left hemiarthroplasty tomorrow.  Has remote history of nonischemic cardiomyopathy with subsequent normal LVEF.  No other risk factors in RCRI.  Lives independently, still active and participates in exercise 4-5 times a week.  No chest pain, dyspnea reported.  She appears dehydrated and is getting IV hydration.  No further workup needed prior to surgery.  As long as she gets hydrated and her hyperkalemia corrected/sodium improves are stable, okay to proceed with surgery.  Discussed with patient and her family members.  -Pain medication-p.o. Tylenol, hydromorphone and IV hydromorphone ordered.  Minimize narcotic, risk of delirium given cognitive impairment.  -Bedrest, postop activity and therapy orders per Ortho.  Social work consult for discharge planning, anticipate TCU at discharge.    Hyperkalemia, mild  Hyponatremia,  mild  Sodium 132 and potassium 5.4.  Creatinine 0.97.  Appears very dehydrated.  Also on lisinopril.  -IV hydration  -Recheck BMP tonight again, given mild hyperkalemia, anticipate this will improve with IV hydration.  -Hold lisinopril    Rhabdomyolysis  CK mildly elevated at  748.  Suspect related to fall.  -IV hydration and follow    Thrombocytopenia  Mild thrombocytopenia, platelet count 112.  -Monitor  -Check iron panel, B12 and folate level.    H/o nonischemic cardiomyopathy  LVEF was 40-45% in 2014.  Coronary angiogram showed normal coronaries.  LVEF subsequently normalized  -Given unclear etiology of fall, monitor on telemetry, will obtain TTE as well    Cognitive impairment  Son reports patient is forgetful and has short-term memory impairment.  No formal diagnosis of dementia but suspect she likely has.  Able to tell me her date of birth but not where about and date.  Does not remember the circumstances around fall.  -Risk of delirium postop.  Discussed with patient's son at length.          Diet:  regular  DVT Prophylaxis: Pneumatic Compression Devices, per ortho post op  Torres Catheter: Not present  Lines: None     Cardiac Monitoring: None  Code Status:  Discussed with patient's son, unable to determine, full code by default for now, patient's son would like to discuss further with patient and update if otherwise    Clinically Significant Risk Factors Present on Admission                            Disposition Plan     Medically Ready for Discharge: Anticipated in 2-4 Days           Karma Emery MD  Hospitalist Service  [facility]  Securely message with [employer] (more info)  Text page via [employer] Paging/Directory     ______________________________________________________________________    Chief Complaint   Found on floor    History is obtained from the patient/her son, chart review and discussion with ER MD    History of Present Illness   Benita Hubbard is a 89 year old  "female with h/o  HTN, non ischemic CMP,  was found on the floor and was brought to the ER by EMS.      Patient lives independently in a senior citizen complex.  She normally is up and picks up her newspaper every day.  The  noted that she had 2 days of newspaper not picked up and so did welfare check and found patient on the ground.  EMS brought her to the ER.    Patient does not remember when and how she fell.  Has left hip pain, otherwise denies chest pain, back pain, headache, neck pain.  Denies dizziness.  No palpitation.  Denies shortness of breath.  No nausea vomiting or diarrhea.    In ER, vitals WNL.  CBC showed mild hyponatremia 132 and hyperkalemia 5.4.  Creatinine 0.97 and BUN 41.6.  CK7 48, blood sugar 122.  CBC showed mild thrombocytopenia at 112.  Twelve-lead EKG sinus rhythm, normal heart rate and no ischemic changes.  Multiple imaging studies including left shoulder x-ray, chest x-ray, pelvis and hip x-ray, CT head and CT C-spine completed, noted left femoral neck fracture with displacement.  Orthopedic surgery consulted, IV fluid initiated and hospitalist contacted for admission.      Past Medical History    Past Medical History:   Diagnosis Date    Cardiomyopathy due to hypertension (H)     Cardiomyopathy, nonischemic (H)     Coronary artery disease     Diverticulitis of colon     Hypertension     Neuropathy     Osteoporosis     Palmar plantar dysesthesia     Small fiber neuropathy     UTI (lower urinary tract infection)        Past Surgical History   Past Surgical History:   Procedure Laterality Date    CORONARY ANGIOGRAPHY ADULT ORDER  4/14/14    normal Coronary arteries    HYSTERECTOMY, PAP NO LONGER INDICATED      ovaries still in place    ORTHOPEDIC SURGERY      Left hand \"hard lumps\" excised    SEPTOPLASTY      SURGICAL HISTORY OF -       vein stripping right leg       Prior to Admission Medications   Prior to Admission Medications   Prescriptions Last Dose Informant Patient " Reported? Taking?   Lidocaine (LIDOCARE) 4 % Patch   No No   Sig: Place 1 patch onto the skin every 24 hours To prevent lidocaine toxicity, patient should be patch free for 12 hrs daily.   acetaminophen (TYLENOL) 325 MG tablet   No No   Sig: Take 3 tablets (975 mg) by mouth every 8 hours   acetaminophen (TYLENOL) 325 MG tablet   No No   Sig: Take 2 tablets (650 mg) by mouth 2 times daily as needed for mild pain ; maximum 4 grams/day of acetaminophen from all sources.   alendronate (FOSAMAX) 70 MG tablet   Yes No   Sig: Take 70 mg by mouth every 7 days Takes on    calcium-vitamin D (CALTRATE) 600-400 MG-UNIT per tablet  Self Yes No   Sig: Take 1 tablet by mouth daily   lisinopril (PRINIVIL,ZESTRIL) 5 MG tablet   No No   Sig: Take 1 tablet (5 mg) by mouth daily   metoprolol succinate ER (TOPROL-XL) 25 MG 24 hr tablet   No No   Sig: Take 1 tablet (25 mg) by mouth every evening   mometasone (NASONEX) 50 MCG/ACT nasal spray  Self Yes No   Sig: Spray 2 sprays into both nostrils daily as needed   oxyCODONE (ROXICODONE) 5 MG tablet   No No   Sig: Take 0.5 tablets (2.5 mg) by mouth every 6 hours as needed for moderate to severe pain   pregabalin (LYRICA) 50 MG capsule   Yes No   Sig: Take 50 mg by mouth every evening      Facility-Administered Medications: None        Review of Systems    The 10 point Review of Systems as reviewed with her son is negative other than noted in the HPI or here.      Social History   I have reviewed this patient's social history and updated it with pertinent information if needed.  Social History     Tobacco Use    Smoking status: Former     Current packs/day: 0.00     Types: Cigarettes     Quit date: 1969     Years since quittin.2    Smokeless tobacco: Never    Tobacco comments:     light smoker--1-2 cigarettes per day   Substance Use Topics    Alcohol use: Yes     Comment: rare    Drug use: No         Family History   I have reviewed this patient's family history and updated  it with pertinent information if needed.  Family History   Problem Relation Age of Onset    Cerebrovascular Disease Mother     Cancer - colorectal Father     Heart Disease No family hx of          Allergies   Allergies   Allergen Reactions    Cephalexin Other (See Comments)     Dizzy and chest tightness    Ciprofloxacin Other (See Comments)     Neuropathy bottom of feet      Nitrofurantoin Other (See Comments)     Neuropathy bottom of feet    Ofloxacin GI Disturbance     Numbness    Sulfa Antibiotics      Coated tongue        Physical Exam   Vital Signs: Temp: (!) 96.3  F (35.7  C) Temp src: Temporal BP: 117/78 Pulse: 69   Resp: 16 SpO2: 100 %      Weight: 0 lbs 0 oz    General: AAOxself, appears comfortable.  HEENT: PERRLA EOMI. Mucosa very dry  Lungs: Bilateral equal air entry. Clear to auscultation, normal work of breathing.   CVS: S1S2 regular, no tachycardia or murmur.   Abdomen: Soft, NT, ND. BS heard.  MSK: No edema. Lt leg is shorter and externally rotated  Neuro: AAOX1. Face symmetrical, moving extremities other than Lt  Skin: No rash. Bruised chin      Medical Decision Making       78 MINUTES SPENT BY ME on the date of service doing chart review, history, exam, documentation & further activities per the note.      Data     I have personally reviewed the following data over the past 24 hrs:    10.6  \   13.9   / 112 (L)     133 (L) 96 (L) 41.6 (H) /  122 (H)   5.4 (H) 22 0.97 (H) \     ALT: 28 AST: N/A AP: 63 TBILI: 0.5   ALB: 3.9 TOT PROTEIN: 6.5 LIPASE: N/A       Imaging results reviewed over the past 24 hrs:   Recent Results (from the past 24 hour(s))   CT Cervical Spine w/o Contrast    Narrative    EXAM: CT CERVICAL SPINE W/O CONTRAST  LOCATION: [facility]  DATE: 10/6/2024    INDICATION: fall dementia. Injury. Pain.  COMPARISON: 19 September 2021 cervical spine CT  TECHNIQUE: Routine CT Cervical Spine without IV contrast. Multiplanar reformats. Dose reduction techniques were  used.    FINDINGS:  Mild endplate concavities at C7 unchanged. Upper endplate concavity at T1 is stable. Chronic T4 upper lower endplate fractures with with sclerotic bone remodeling unchanged but incompletely visualized. No acute fractures. Prevertebral soft tissues   unremarkable. No extraspinal abnormality.     Craniovertebral junction and C1-C2: Normal.    C2-C3: Very small central disc protrusion. Normal facets. No spinal canal or neural foraminal stenosis.     C3-C4: Facet DJD and mild annular disc bulge. Patent central canal and foramen.     C4-C5: 2.5 mm degenerative anterolisthesis. Facet arthropathy. Moderate right and mild left foraminal stenosis. Patent central canal.     C5-C6: Disc osteophyte. Facet DJD. Severe right and moderate left foraminal stenosis. Patent central canal.    C6-C7: 1.5 mm degenerative anterolisthesis. Disc and facet degenerative changes. Patent central canal. Mild bilateral foraminal stenosis.     C7-T1: Facet DJD. Patent central canal and foramina.       Impression    IMPRESSION:  1.  Lower cervical and thoracic residual from previous trauma. No interval change.    2.  Multilevel cervical spondylosis.    3.  No acute fractures or evidence of traumatic malalignment.   Head CT w/o contrast    Narrative    EXAM: CT HEAD W/O CONTRAST  LOCATION: [facility]  DATE: 10/6/2024    INDICATION: fall head injiury. Pain.  COMPARISON: Head CT 19 September 2021]  TECHNIQUE: Routine CT Head without IV contrast. Multiplanar reformats. Dose reduction techniques were used.    FINDINGS:  INTRACRANIAL CONTENTS: No intracranial hemorrhage, extraaxial collection, or mass effect.  No CT evidence of acute infarct. Mild to moderate presumed chronic small vessel ischemic changes. Moderate generalized volume loss. No hydrocephalus. Tiny, chronic   infarcts in the cerebellar hemispheres. Skull base vascular calcifications.    VISUALIZED ORBITS/SINUSES/MASTOIDS: Prior bilateral  cataract surgery. Visualized portions of the orbits are otherwise unremarkable. Chronic opacification of the left maxillary sinus with central dystrophic calcifications and chronic reactive osteitis. No   bone destruction. Mild anterior left ethmoid membrane thickening. No middle ear or mastoid effusion.    BONES/SOFT TISSUES: No acute abnormality. Mild left TMJ arthropathy.      Impression    IMPRESSION:  1.  No CT evidence for acute intracranial process.  2.  Brain atrophy and presumed chronic microvascular ischemic changes as above.   XR Pelvis w Hip Left 1 View    Narrative    EXAM: XR PELVIS AND HIP LEFT 1 VIEW  LOCATION: [facility]  DATE: 10/6/2024    INDICATION: eval for fx  COMPARISON: None.      Impression    IMPRESSION: Acute mildly displaced left femoral neck fracture. Left femoral head remains appropriately located within the acetabulum. Asymmetric contour deformity cortical irregularity at the junction of the left pubic tubercle and left superior pubic   ramus as well as along the left inferior pubic ramus, suggestive of additional fractures. Mild joint space narrowing both hips. Severe diffuse osseous demineralization. Limited evaluation of the sacrum secondary to overlying bowel gas.   XR Chest 1 View    Narrative    EXAM: XR CHEST 1 VIEW  LOCATION: [facility]  DATE: 10/6/2024    INDICATION: fall hypoixia  COMPARISON: CT of the chest 9/9/2021      Impression    IMPRESSION:     Cardiac enlargement with particular enlargement of the left ventricular heart border. Mild aortic atheromatous calcifications. Vascular pedicle with is not increased. Lucency at the left tracheobronchial angle reflects retained air in the esophagus and   suggests esophageal dysmotility.    Symmetric lung inflation. There are no focal airspace opacities. No findings to suggest interstitial lung edema. No menisci in the bases to indicate pleural effusions.    Disc space narrowing  particularly of the mid thoracic vertebra and marginal osteophytes. No acute displaced rib fractures are detected.   XR Shoulder Left G/E 3 Views    Narrative    EXAM: XR SHOULDER LEFT G/E 3 VIEWS  LOCATION: [facility]  DATE: 10/6/2024    INDICATION: fall shoulder pain  COMPARISON: None.      Impression    IMPRESSION: No fracture or malalignment. Soft tissue calcination projecting over the greater tuberosity which is favored to represent calcific tendinitis. Diffuse osseous demineralization which limits evaluation for nondisplaced fractures and subtle   osseous lesions.

## 2024-10-06 NOTE — PROGRESS NOTES
RECEIVING UNIT ED HANDOFF REVIEW    ED Nurse Handoff Report was reviewed by: Andrew Davila RN on October 6, 2024 at 4:45 PM

## 2024-10-06 NOTE — CONSULTS
[facility]    Orthopedic Consultation    Benita Hubbard MRN# [id number]   Age: 89 year old YOB: 1931     Date of Admission:  10/6/2024    Reason for consult: Left femoral neck fracture       Requesting physician: Emergency department       Level of consult: Consult, follow and place orders           Assessment and Plan:   Assessment:   Benita Hubbard is a markedly pleasant 89-year-old female admitted to self the hospital following a unwitnessed fall at her independent living facility and sustained a left mild neck fracture.      Plan:   The patient's history and clinical/diagnostic findings were reviewed with the on-call orthopedic trauma surgeon. The patient is a candidate for left hemiarthroplasty.  This will be scheduled for tomorrow pending surgical optimization by hospital team.  Surgeon will discuss the risks, benefits, and outcomes of surgery while obtaining consent.  Discussed general surgical plans and postoperative care with patient's son and his wife.  The patient and her family is in agreement with surgical plans at this time.    Surgeon: Dr. Titi Julian  NPO at midnight.  NWB/bedrest until postop.  Continue pain regimen.  Muscle relaxant available for PRN use.  Anticoagulation: Hold (none per chart review)  Vitamin D deficiency lab ordered.  Type and screen ordered.    Please contact orthopedic trauma team if any questions or concerns arise.           Chief Complaint:   The femoral neck fracture         History of Present Illness:   Benita Hubbard is a 89 year old female with h/o  HTN, non ischemic CMP,  was found on the floor and was brought to the ER by EMS.       Patient lives independently in a senior citizen complex.  She normally is up and picks up her newspaper every day.  The  noted that she had 2 days of newspaper not picked up and so did welfare check and found patient on the ground.  EMS brought her to the ER.     Patient does  "not remember when and how she fell.  She typically ambulates with a walker at baseline.  Does not have known history of dementia but is delirious in the emergency department seeing flowers and ornaments within the overhead lighting.  She is also having coherent conversations with her son.  No further orthopedic concerns at this time          Past Medical History:     Past Medical History:   Diagnosis Date    Cardiomyopathy due to hypertension (H)     Cardiomyopathy, nonischemic (H)     Coronary artery disease     Diverticulitis of colon     Hypertension     Neuropathy     Osteoporosis     Palmar plantar dysesthesia     Small fiber neuropathy     UTI (lower urinary tract infection)              Past Surgical History:     Past Surgical History:   Procedure Laterality Date    CORONARY ANGIOGRAPHY ADULT ORDER  14    normal Coronary arteries    HYSTERECTOMY, PAP NO LONGER INDICATED      ovaries still in place    ORTHOPEDIC SURGERY      Left hand \"hard lumps\" excised    SEPTOPLASTY      SURGICAL HISTORY OF -       vein stripping right leg             Social History:     Social History     Tobacco Use    Smoking status: Former     Current packs/day: 0.00     Types: Cigarettes     Quit date: 1969     Years since quittin.2    Smokeless tobacco: Never    Tobacco comments:     light smoker--1-2 cigarettes per day   Substance Use Topics    Alcohol use: Yes     Comment: rare             Family History:     Family History   Problem Relation Age of Onset    Cerebrovascular Disease Mother     Cancer - colorectal Father     Heart Disease No family hx of              Immunizations:     VACCINE/DOSE   Diptheria   DPT   DTAP   HBIG   Hepatitis A   Hepatitis B   HIB   Influenza   Measles   Meningococcal   MMR   Mumps   Pneumococcal   Polio   Rubella   Small Pox   TDAP   Varicella   Zoster             Allergies:     Allergies   Allergen Reactions    Cephalexin Other (See Comments)     Dizzy and chest tightness    " Ciprofloxacin Other (See Comments)     Neuropathy bottom of feet      Nitrofurantoin Other (See Comments)     Neuropathy bottom of feet    Ofloxacin GI Disturbance     Numbness    Sulfa Antibiotics      Coated tongue             Medications:     Current Facility-Administered Medications   Medication Dose Route Frequency Provider Last Rate Last Admin    acetaminophen (TYLENOL) tablet 975 mg  975 mg Oral Q8H PRN Nunu Jones PA-C        HYDROmorphone (DILAUDID) injection 0.2 mg  0.2 mg Intravenous Q3H PRN Nunu Jones PA-C        methocarbamol (ROBAXIN) half-tab 250 mg  250 mg Oral 4x Daily PRN Nunu Jones PA-C        naloxone (NARCAN) injection 0.2 mg  0.2 mg Intravenous Q2 Min PRN Nunu Jones PA-C        Or    naloxone (NARCAN) injection 0.4 mg  0.4 mg Intravenous Q2 Min PRN Nunu Jones PA-C        Or    naloxone (NARCAN) injection 0.2 mg  0.2 mg Intramuscular Q2 Min PRN Nunu Jones PA-C        Or    naloxone (NARCAN) injection 0.4 mg  0.4 mg Intramuscular Q2 Min PRN Nunu Jones PA-C        oxyCODONE IR (ROXICODONE) half-tab 2.5 mg  2.5 mg Oral Q4H PRN Nunu Jones PA-C        Or    oxyCODONE (ROXICODONE) tablet 5 mg  5 mg Oral Q4H PRN Nunu Jones PA-C         Current Outpatient Medications   Medication Sig Dispense Refill    acetaminophen (TYLENOL) 325 MG tablet Take 2 tablets (650 mg) by mouth 2 times daily as needed for mild pain ; maximum 4 grams/day of acetaminophen from all sources. 30 tablet 1    alendronate (FOSAMAX) 70 MG tablet Take 70 mg by mouth every 7 days Takes on Saturdays      calcium-vitamin D (CALTRATE) 600-400 MG-UNIT per tablet Take 1 tablet by mouth daily      lisinopril (PRINIVIL,ZESTRIL) 5 MG tablet Take 1 tablet (5 mg) by mouth daily 90 tablet 1    metoprolol succinate ER (TOPROL-XL) 25 MG 24 hr tablet Take 1 tablet (25 mg) by mouth every evening 30 tablet 3    mometasone (NASONEX) 50 MCG/ACT nasal spray Spray 2 sprays into  both nostrils daily as needed      pregabalin (LYRICA) 50 MG capsule Take 50 mg by mouth every evening               Review of Systems:   CV: NEGATIVE for chest pain, palpitations or peripheral edema  C: NEGATIVE for fever, chills, change in weight  E/M: NEGATIVE for ear, mouth and throat problems  R: NEGATIVE for significant cough or SOB          Physical Exam:   All vitals have been reviewed  Patient Vitals for the past 24 hrs:   BP Temp Temp src Pulse Resp SpO2   10/06/24 1409 -- -- -- 73 11 95 %   10/06/24 1400 115/85 -- -- 75 15 --   10/06/24 1359 -- -- -- 76 17 --   10/06/24 1358 -- -- -- 75 -- 90 %   10/06/24 1354 115/85 -- -- 75 14 91 %   10/06/24 1348 -- -- -- 76 13 96 %   10/06/24 1339 135/86 -- -- 86 12 96 %   10/06/24 1253 119/81 -- -- -- -- --   10/06/24 1153 -- -- -- 70 14 99 %   10/06/24 1148 -- -- -- 70 12 100 %   10/06/24 1127 117/78 (!) 96.3  F (35.7  C) Temporal 69 -- --   10/06/24 1126 -- -- -- -- 16 --   10/06/24 1125 -- -- -- -- -- 100 %       Intake/Output Summary (Last 24 hours) at 10/6/2024 1526  Last data filed at 10/6/2024 1322  Gross per 24 hour   Intake 500 ml   Output --   Net 500 ml       Constitutional: Pleasant, alert to self, mild delirium, appropriate, following commands.  HEENT: Head atraumatic normocephalic.   Respiratory: Unlabored breathing no audible wheeze  Cardiovascular: Regular rate and rhythm per pulses.  GI: Abdomen is non-distended.  Lymph/Hematologic: No lymphadenopathy in areas examined.  Genitourinary: = No lucero  Skin: No rashes, no cyanosis, no edema.  Musculoskeletal: On examination of left lower extremity her skin is intact with no open wounds.  No obvious hematoma or ecchymosis.  Her leg is in a shortened and externally rotated position.  Her sensation is intact in all distributions.  Palpable DP pulse.  Brisk cap refill.  Pain on logroll of the left hip.  No obvious deformity of the right hip or lower extremity  Neurologic: normal without focal findings, mental  status, speech normal, alert and oriented x iii,   Neuropsychiatric: Stable, some delirium noted          Data:   All laboratory data reviewed  Results for orders placed or performed during the hospital encounter of 10/06/24   Head CT w/o contrast     Status: None    Narrative    EXAM: CT HEAD W/O CONTRAST  LOCATION: [facility]  DATE: 10/6/2024    INDICATION: fall head injiury. Pain.  COMPARISON: Head CT 19 September 2021]  TECHNIQUE: Routine CT Head without IV contrast. Multiplanar reformats. Dose reduction techniques were used.    FINDINGS:  INTRACRANIAL CONTENTS: No intracranial hemorrhage, extraaxial collection, or mass effect.  No CT evidence of acute infarct. Mild to moderate presumed chronic small vessel ischemic changes. Moderate generalized volume loss. No hydrocephalus. Tiny, chronic   infarcts in the cerebellar hemispheres. Skull base vascular calcifications.    VISUALIZED ORBITS/SINUSES/MASTOIDS: Prior bilateral cataract surgery. Visualized portions of the orbits are otherwise unremarkable. Chronic opacification of the left maxillary sinus with central dystrophic calcifications and chronic reactive osteitis. No   bone destruction. Mild anterior left ethmoid membrane thickening. No middle ear or mastoid effusion.    BONES/SOFT TISSUES: No acute abnormality. Mild left TMJ arthropathy.      Impression    IMPRESSION:  1.  No CT evidence for acute intracranial process.  2.  Brain atrophy and presumed chronic microvascular ischemic changes as above.   CT Cervical Spine w/o Contrast     Status: None    Narrative    EXAM: CT CERVICAL SPINE W/O CONTRAST  LOCATION: [facility]  DATE: 10/6/2024    INDICATION: fall dementia. Injury. Pain.  COMPARISON: 19 September 2021 cervical spine CT  TECHNIQUE: Routine CT Cervical Spine without IV contrast. Multiplanar reformats. Dose reduction techniques were used.    FINDINGS:  Mild endplate concavities at C7 unchanged. Upper  endplate concavity at T1 is stable. Chronic T4 upper lower endplate fractures with with sclerotic bone remodeling unchanged but incompletely visualized. No acute fractures. Prevertebral soft tissues   unremarkable. No extraspinal abnormality.     Craniovertebral junction and C1-C2: Normal.    C2-C3: Very small central disc protrusion. Normal facets. No spinal canal or neural foraminal stenosis.     C3-C4: Facet DJD and mild annular disc bulge. Patent central canal and foramen.     C4-C5: 2.5 mm degenerative anterolisthesis. Facet arthropathy. Moderate right and mild left foraminal stenosis. Patent central canal.     C5-C6: Disc osteophyte. Facet DJD. Severe right and moderate left foraminal stenosis. Patent central canal.    C6-C7: 1.5 mm degenerative anterolisthesis. Disc and facet degenerative changes. Patent central canal. Mild bilateral foraminal stenosis.     C7-T1: Facet DJD. Patent central canal and foramina.       Impression    IMPRESSION:  1.  Lower cervical and thoracic residual from previous trauma. No interval change.    2.  Multilevel cervical spondylosis.    3.  No acute fractures or evidence of traumatic malalignment.   XR Pelvis w Hip Left 1 View     Status: None    Narrative    EXAM: XR PELVIS AND HIP LEFT 1 VIEW  LOCATION: [facility]  DATE: 10/6/2024    INDICATION: eval for fx  COMPARISON: None.      Impression    IMPRESSION: Acute mildly displaced left femoral neck fracture. Left femoral head remains appropriately located within the acetabulum. Asymmetric contour deformity cortical irregularity at the junction of the left pubic tubercle and left superior pubic   ramus as well as along the left inferior pubic ramus, suggestive of additional fractures. Mild joint space narrowing both hips. Severe diffuse osseous demineralization. Limited evaluation of the sacrum secondary to overlying bowel gas.   XR Chest 1 View     Status: None    Narrative    EXAM: XR CHEST 1  VIEW  LOCATION: [facility]  DATE: 10/6/2024    INDICATION: fall hypoixia  COMPARISON: CT of the chest 9/9/2021      Impression    IMPRESSION:     Cardiac enlargement with particular enlargement of the left ventricular heart border. Mild aortic atheromatous calcifications. Vascular pedicle with is not increased. Lucency at the left tracheobronchial angle reflects retained air in the esophagus and   suggests esophageal dysmotility.    Symmetric lung inflation. There are no focal airspace opacities. No findings to suggest interstitial lung edema. No menisci in the bases to indicate pleural effusions.    Disc space narrowing particularly of the mid thoracic vertebra and marginal osteophytes. No acute displaced rib fractures are detected.   XR Shoulder Left G/E 3 Views     Status: None    Narrative    EXAM: XR SHOULDER LEFT G/E 3 VIEWS  LOCATION: [facility]  DATE: 10/6/2024    INDICATION: fall shoulder pain  COMPARISON: None.      Impression    IMPRESSION: No fracture or malalignment. Soft tissue calcination projecting over the greater tuberosity which is favored to represent calcific tendinitis. Diffuse osseous demineralization which limits evaluation for nondisplaced fractures and subtle   osseous lesions.   Comprehensive metabolic panel     Status: Abnormal   Result Value Ref Range    Sodium 133 (L) 135 - 145 mmol/L    Potassium 5.4 (H) 3.4 - 5.3 mmol/L    Carbon Dioxide (CO2) 22 22 - 29 mmol/L    Anion Gap 15 7 - 15 mmol/L    Urea Nitrogen 41.6 (H) 8.0 - 23.0 mg/dL    Creatinine 0.97 (H) 0.51 - 0.95 mg/dL    GFR Estimate 54 (L) >60 mL/min/1.73m2    Calcium 9.4 8.8 - 10.4 mg/dL    Chloride 96 (L) 98 - 107 mmol/L    Glucose 122 (H) 70 - 99 mg/dL    Alkaline Phosphatase 63 40 - 150 U/L    AST      ALT 28 0 - 50 U/L    Protein Total 6.5 6.4 - 8.3 g/dL    Albumin 3.9 3.5 - 5.2 g/dL    Bilirubin Total 0.5 <=1.2 mg/dL   CK total     Status: Abnormal   Result Value Ref  Range     (H) 26 - 192 U/L   CBC with platelets and differential     Status: Abnormal   Result Value Ref Range    WBC Count 10.6 4.0 - 11.0 10e3/uL    RBC Count 4.53 3.80 - 5.20 10e6/uL    Hemoglobin 13.9 11.7 - 15.7 g/dL    Hematocrit 42.5 35.0 - 47.0 %    MCV 94 78 - 100 fL    MCH 30.7 26.5 - 33.0 pg    MCHC 32.7 31.5 - 36.5 g/dL    RDW 13.5 10.0 - 15.0 %    Platelet Count 112 (L) 150 - 450 10e3/uL    % Neutrophils 88 %    % Lymphocytes 6 %    % Monocytes 5 %    % Eosinophils 0 %    % Basophils 0 %    % Immature Granulocytes 0 %    NRBCs per 100 WBC 0 <1 /100    Absolute Neutrophils 9.3 (H) 1.6 - 8.3 10e3/uL    Absolute Lymphocytes 0.6 (L) 0.8 - 5.3 10e3/uL    Absolute Monocytes 0.6 0.0 - 1.3 10e3/uL    Absolute Eosinophils 0.0 0.0 - 0.7 10e3/uL    Absolute Basophils 0.0 0.0 - 0.2 10e3/uL    Absolute Immature Granulocytes 0.0 <=0.4 10e3/uL    Absolute NRBCs 0.0 10e3/uL   Bruno Draw     Status: None    Narrative    The following orders were created for panel order Bruno Draw.  Procedure                               Abnormality         Status                     ---------                               -----------         ------                     Extra Green Top (Lithium...[[id number]]                      Final result                 Please view results for these tests on the individual orders.   Extra Green Top (Lithium Heparin) Tube     Status: None   Result Value Ref Range    Hold Specimen 0    EKG 12 lead     Status: None   Result Value Ref Range    Systolic Blood Pressure  mmHg    Diastolic Blood Pressure  mmHg    Ventricular Rate 74 BPM    Atrial Rate 74 BPM    IA Interval 130 ms    QRS Duration 94 ms     ms    QTc 481 ms    P Axis 18 degrees    R AXIS -15 degrees    T Axis 15 degrees    Interpretation ECG       Sinus rhythm  Low voltage QRS  Incomplete right bundle branch block  Borderline ECG  When compared with ECG of 19-Sep-2021 10:27,  Incomplete right bundle branch block is now  Present  Confirmed by GENERATED REPORT, COMPUTER ([id number]),  Kylee Carrillo ([id number]) on 10/6/2024 2:46:29 PM     Adult Type and Screen     Status: None   Result Value Ref Range    ABO/RH(D) O POS     Antibody Screen Negative Negative    SPECIMEN EXPIRATION DATE [id number]    CBC with platelets differential     Status: Abnormal    Narrative    The following orders were created for panel order CBC with platelets differential.  Procedure                               Abnormality         Status                     ---------                               -----------         ------                     CBC with platelets and d...[[id number]]  Abnormal            Final result               RBC and Platelet Morphology[[id number]]                                                   Please view results for these tests on the individual orders.   ABO/Rh type and screen *Canceled*     Status: None ()    Narrative    The following orders were created for panel order ABO/Rh type and screen.  Procedure                               Abnormality         Status                     ---------                               -----------         ------                     Adult Type and Screen[[id number]]                                                         Please view results for these tests on the individual orders.   ABO/Rh type and screen     Status: None    Narrative    The following orders were created for panel order ABO/Rh type and screen.  Procedure                               Abnormality         Status                     ---------                               -----------         ------                     Adult Type and Screen[[id number]]                            Final result                 Please view results for these tests on the individual orders.          Attestation:  I have reviewed today's vital signs, notes, medications, labs and imaging with Dr. Julian.  Amount of time performed on this consult: 50  minutes.    Nunu Jones PA-C  [facility]

## 2024-10-06 NOTE — PLAN OF CARE
Orthopedic Surgery    Benita Hubbard is a 89-year-old female who presents with a left displaced femoral neck fracture.  She lives in a senior living apartment and sustained a mechanical fall.  Radiographs obtained in the emergency department reveal her fracture.  Recommend surgical intervention including a left hip hemiarthroplasty pending medical evaluation.  Plan for likely surgery tomorrow.    Full consult to follow    HEATHER IBRAHIM MD

## 2024-10-06 NOTE — PHARMACY-ADMISSION MEDICATION HISTORY
Pharmacy Intern Admission Medication History    Admission medication history is complete. The information provided in this note is only as accurate as the sources available at the time of the update.    Information Source(s): Family member and CareEverywhere/SureScripts via in-person    Pertinent Information: The patient's son verified the medication list. He manages the patient's medications.    Changes made to PTA medication list:  Added: None  Deleted:   Oxycodone  Lidocaine patch  Changed: None    Allergies reviewed with patient and updates made in EHR: yes    Medication History Completed By: Dee Dee Chawla 10/6/2024 1:18 PM    PTA Med List   Medication Sig Last Dose    acetaminophen (TYLENOL) 325 MG tablet Take 2 tablets (650 mg) by mouth 2 times daily as needed for mild pain ; maximum 4 grams/day of acetaminophen from all sources.  at PRN    alendronate (FOSAMAX) 70 MG tablet Take 70 mg by mouth every 7 days Takes on Saturdays 9/28/2024    calcium-vitamin D (CALTRATE) 600-400 MG-UNIT per tablet Take 1 tablet by mouth daily 10/4/2024    lisinopril (PRINIVIL,ZESTRIL) 5 MG tablet Take 1 tablet (5 mg) by mouth daily 10/4/2024    metoprolol succinate ER (TOPROL-XL) 25 MG 24 hr tablet Take 1 tablet (25 mg) by mouth every evening 10/4/2024    mometasone (NASONEX) 50 MCG/ACT nasal spray Spray 2 sprays into both nostrils daily as needed  at PRN    pregabalin (LYRICA) 50 MG capsule Take 50 mg by mouth every evening 10/4/2024

## 2024-10-06 NOTE — ED PROVIDER NOTES
Emergency Department Note      History of Present Illness     Chief Complaint   Fall      HPI   Benita Hubbard is a 89 year old female who presents with found on ground.  Patient is a 89-year-old female who lives in a apartment.  Patiently normally goes out to  her newspaper.  There was a welfare check to find her this morning as her newspapers were piling up outside her apartment and was found on the ground.  There was a concern for hip fracture also some bruising on her face that is new and was placed in a c-collar and transported to the emergency room by ambulance for assessment on arrival patient is a poor historian and she is here with her son who states normally she goes out and gets her paper on a regular basis.  They are unsure how long she was on the ground.    Independent Historian   Son as detailed above.    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)     Cardiomyopathy, nonischemic (H)     Coronary artery disease     Diverticulitis of colon     Hypertension     Neuropathy     Osteoporosis     Palmar plantar dysesthesia     Small fiber neuropathy     UTI (lower urinary tract infection)        Medications   acetaminophen (TYLENOL) 325 MG tablet  acetaminophen (TYLENOL) 325 MG tablet  alendronate (FOSAMAX) 70 MG tablet  calcium-vitamin D (CALTRATE) 600-400 MG-UNIT per tablet  Lidocaine (LIDOCARE) 4 % Patch  lisinopril (PRINIVIL,ZESTRIL) 5 MG tablet  metoprolol succinate ER (TOPROL-XL) 25 MG 24 hr tablet  mometasone (NASONEX) 50 MCG/ACT nasal spray  oxyCODONE (ROXICODONE) 5 MG tablet  pregabalin (LYRICA) 50 MG capsule        Surgical History   Past Surgical History:   Procedure Laterality Date    CORONARY ANGIOGRAPHY ADULT ORDER  4/14/14    normal Coronary arteries    HYSTERECTOMY, PAP NO LONGER INDICATED      ovaries still in place    ORTHOPEDIC SURGERY      Left hand  "\"hard lumps\" excised    SEPTOPLASTY      SURGICAL HISTORY OF -       vein stripping right leg       Physical Exam     Patient Vitals for the past 24 hrs:   BP Temp Temp src Pulse Resp SpO2   10/06/24 1127 117/78 (!) 96.3  F (35.7  C) Temporal 69 -- --   10/06/24 1126 -- -- -- -- 16 --   10/06/24 1125 -- -- -- -- -- 100 %     Physical Exam  Vitals reviewed.   HENT:      Head: Normocephalic.      Right Ear: Tympanic membrane normal.      Left Ear: Tympanic membrane normal.      Mouth/Throat:      Mouth: Mucous membranes are dry.   Eyes:      Pupils: Pupils are equal, round, and reactive to light.   Neck:      Comments: In c-collar remained until imaging  Cardiovascular:      Rate and Rhythm: Normal rate and regular rhythm.   Pulmonary:      Effort: Pulmonary effort is normal. No respiratory distress.   Abdominal:      General: Abdomen is flat. Bowel sounds are normal.   Musculoskeletal:         General: Normal range of motion.      Comments: There is slight shortening and external rotation of the left hip.  There is inability to left hip without pain.   Skin:     General: Skin is warm.      Capillary Refill: Capillary refill takes less than 2 seconds.   Neurological:      General: No focal deficit present.      Mental Status: She is alert and oriented to person, place, and time.   Psychiatric:         Mood and Affect: Mood normal.           Diagnostics     Lab Results   Labs Ordered and Resulted from Time of ED Arrival to Time of ED Departure   COMPREHENSIVE METABOLIC PANEL - Abnormal       Result Value    Sodium 133 (*)     Potassium 5.4 (*)     Carbon Dioxide (CO2) 22      Anion Gap 15      Urea Nitrogen 41.6 (*)     Creatinine 0.97 (*)     GFR Estimate 54 (*)     Calcium 9.4      Chloride 96 (*)     Glucose 122 (*)     Alkaline Phosphatase 63      AST        ALT 28      Protein Total 6.5      Albumin 3.9      Bilirubin Total 0.5     CK TOTAL - Abnormal     (*)    CBC WITH PLATELETS AND DIFFERENTIAL - Abnormal "    WBC Count 10.6      RBC Count 4.53      Hemoglobin 13.9      Hematocrit 42.5      MCV 94      MCH 30.7      MCHC 32.7      RDW 13.5      Platelet Count 112 (*)     % Neutrophils 88      % Lymphocytes 6      % Monocytes 5      % Eosinophils 0      % Basophils 0      % Immature Granulocytes 0      NRBCs per 100 WBC 0      Absolute Neutrophils 9.3 (*)     Absolute Lymphocytes 0.6 (*)     Absolute Monocytes 0.6      Absolute Eosinophils 0.0      Absolute Basophils 0.0      Absolute Immature Granulocytes 0.0      Absolute NRBCs 0.0     TYPE AND SCREEN, ADULT    ABO/RH(D) O POS      SPECIMEN EXPIRATION DATE [id number]     ABO/RH TYPE AND SCREEN       Imaging   XR Shoulder Left G/E 3 Views   Final Result   IMPRESSION: No fracture or malalignment. Soft tissue calcination projecting over the greater tuberosity which is favored to represent calcific tendinitis. Diffuse osseous demineralization which limits evaluation for nondisplaced fractures and subtle    osseous lesions.      XR Chest 1 View   Final Result   IMPRESSION:       Cardiac enlargement with particular enlargement of the left ventricular heart border. Mild aortic atheromatous calcifications. Vascular pedicle with is not increased. Lucency at the left tracheobronchial angle reflects retained air in the esophagus and    suggests esophageal dysmotility.      Symmetric lung inflation. There are no focal airspace opacities. No findings to suggest interstitial lung edema. No menisci in the bases to indicate pleural effusions.      Disc space narrowing particularly of the mid thoracic vertebra and marginal osteophytes. No acute displaced rib fractures are detected.      XR Pelvis w Hip Left 1 View   Final Result   IMPRESSION: Acute mildly displaced left femoral neck fracture. Left femoral head remains appropriately located within the acetabulum. Asymmetric contour deformity cortical irregularity at the junction of the left pubic tubercle and left superior pubic     ramus as well as along the left inferior pubic ramus, suggestive of additional fractures. Mild joint space narrowing both hips. Severe diffuse osseous demineralization. Limited evaluation of the sacrum secondary to overlying bowel gas.      Head CT w/o contrast   Final Result   IMPRESSION:   1.  No CT evidence for acute intracranial process.   2.  Brain atrophy and presumed chronic microvascular ischemic changes as above.      CT Cervical Spine w/o Contrast   Final Result   IMPRESSION:   1.  Lower cervical and thoracic residual from previous trauma. No interval change.      2.  Multilevel cervical spondylosis.      3.  No acute fractures or evidence of traumatic malalignment.          EKG   ECG taken at 1322, ECG read at 1330  Rate 74 bpm. NY interval 130 ms. QRS duration 94 ms. QT/QTc 434/484 ms. normal sinus rhythm no ST or T wave abnormalities.      Independent Interpretation   Impacted femoral neck fracture of the left hip    ED Course      Medications Administered   Medications   sodium chloride 0.9% BOLUS 500 mL (has no administration in time range)       Procedures   Procedures     Discussion of Management   Orthopedics,      ED Course        Additional Documentation  None    Medical Decision Making / Diagnosis       KATIA Hubbard is a 89 year old female who presents after found on ground.  Examination is suspicious for left hip fracture.  Shoulder x-rays are negative.  Head and neck imaging performed due to ecchymosis over the left jaw and concerns for head injury at her age.  C-spine was cleared also using imaging due to distracting injury of the left hip.  No signs of fracture.  C-spine was immobilization was discontinued.  Recommend admission for orthopedic surgery and likely orthopedic repair of her left hip.  Care was discussed with the hospitalist patient is mildly hyperkalemic no signs of EKG changes suspect this is related to dehydration and will improve with IV fluid.    Disposition    The patient was admitted to the hospital.     Diagnosis     ICD-10-CM    1. Closed displaced fracture of left femoral neck (H)  S72.002A Case Request: HIP HEMIARTHROPLASTY     Case Request: HIP HEMIARTHROPLASTY      2. Non-traumatic rhabdomyolysis  M62.82       3. Hip fracture, left, closed, initial encounter (H)  S72.002A       4. MAGNO (acute kidney injury) (H)  N17.9            Discharge Medications   New Prescriptions    No medications on file         MD Rula Gonzales Brian Samuel, MD  10/06/24 [id number]

## 2024-10-06 NOTE — ED NOTES
[facility]  ED Nurse Handoff Report    ED Chief complaint: Fall      ED Diagnosis:   Final diagnoses:   Non-traumatic rhabdomyolysis   Hip fracture, left, closed, initial encounter (H)   MAGNO (acute kidney injury) (H)       Code Status: To be determined by admitting provider.     Allergies:   Allergies   Allergen Reactions    Cephalexin Other (See Comments)     Dizzy and chest tightness    Ciprofloxacin Other (See Comments)     Neuropathy bottom of feet      Nitrofurantoin Other (See Comments)     Neuropathy bottom of feet    Ofloxacin GI Disturbance     Numbness    Sulfa Antibiotics      Coated tongue       Patient Story:  Pt had an unwitnessed fall at home where she lives independently in senior living facility. Fx of left hip. Unsure how long was on ground for.    Focused Assessment:    A&Ox4. Breathing rate, rhythm and SPO2 WDL. Denies cough or SOB. Denies chest pain. HR WDL. L hip and L shoulder pain.     Labs Ordered and Resulted from Time of ED Arrival to Time of ED Departure   COMPREHENSIVE METABOLIC PANEL - Abnormal       Result Value    Sodium 133 (*)     Potassium 5.4 (*)     Carbon Dioxide (CO2) 22      Anion Gap 15      Urea Nitrogen 41.6 (*)     Creatinine 0.97 (*)     GFR Estimate 54 (*)     Calcium 9.4      Chloride 96 (*)     Glucose 122 (*)     Alkaline Phosphatase 63      AST        ALT 28      Protein Total 6.5      Albumin 3.9      Bilirubin Total 0.5     CK TOTAL - Abnormal     (*)    CBC WITH PLATELETS AND DIFFERENTIAL - Abnormal    WBC Count 10.6      RBC Count 4.53      Hemoglobin 13.9      Hematocrit 42.5      MCV 94      MCH 30.7      MCHC 32.7      RDW 13.5      Platelet Count 112 (*)     % Neutrophils 88      % Lymphocytes 6      % Monocytes 5      % Eosinophils 0      % Basophils 0      % Immature Granulocytes 0      NRBCs per 100 WBC 0      Absolute Neutrophils 9.3 (*)     Absolute Lymphocytes 0.6 (*)     Absolute Monocytes 0.6      Absolute Eosinophils 0.0       Absolute Basophils 0.0      Absolute Immature Granulocytes 0.0      Absolute NRBCs 0.0     TYPE AND SCREEN, ADULT    SPECIMEN EXPIRATION DATE [id number]     ABO/RH TYPE AND SCREEN       XR Shoulder Left G/E 3 Views   Final Result   IMPRESSION: No fracture or malalignment. Soft tissue calcination projecting over the greater tuberosity which is favored to represent calcific tendinitis. Diffuse osseous demineralization which limits evaluation for nondisplaced fractures and subtle    osseous lesions.      XR Pelvis w Hip Left 1 View   Final Result   IMPRESSION: Acute mildly displaced left femoral neck fracture. Left femoral head remains appropriately located within the acetabulum. Asymmetric contour deformity cortical irregularity at the junction of the left pubic tubercle and left superior pubic    ramus as well as along the left inferior pubic ramus, suggestive of additional fractures. Mild joint space narrowing both hips. Severe diffuse osseous demineralization. Limited evaluation of the sacrum secondary to overlying bowel gas.      Head CT w/o contrast   Final Result   IMPRESSION:   1.  No CT evidence for acute intracranial process.   2.  Brain atrophy and presumed chronic microvascular ischemic changes as above.      CT Cervical Spine w/o Contrast   Final Result   IMPRESSION:   1.  Lower cervical and thoracic residual from previous trauma. No interval change.      2.  Multilevel cervical spondylosis.      3.  No acute fractures or evidence of traumatic malalignment.      XR Chest 1 View    (Results Pending)         Treatments and/or interventions provided:  Medications   sodium chloride 0.9% BOLUS 1,000 mL (has no administration in time range)   sodium chloride 0.9% BOLUS 500 mL (0 mLs Intravenous Stopped 10/6/24 1322)       Patient's response to treatments and/or interventions:  Patient remains stable.     To be done/followed up on inpatient unit:   See any in-patient orders. Hip arthroplasty.     Does this  patient have any cognitive concerns?: Forgetful    Activity level - Baseline/Home:    Independent    Activity Level - Current:    Total Care    Patient's Preferred language: English     Needed?: No    Isolation: None  Infection: Not Applicable  Patient tested for COVID 19 prior to admission: NO    Bariatric?: No    Vital Signs:   Vitals:    10/06/24 1126 10/06/24 1127 10/06/24 1153 10/06/24 1253   BP:  117/78  119/81   Pulse:  69 70    Resp: 16  14    Temp:  (!) 96.3  F (35.7  C)     TempSrc:  Temporal     SpO2:   99%        Cardiac Rhythm:     Was the PSS-3 completed:   Yes  What interventions are required if any?                 Family Comments: Son and DIL at bedside.     OBS brochure/video discussed/provided to patient/family: No              Name of person given brochure if not patient: N/A              Relationship to patient: N/A    For the majority of the shift this patient's behavior was Green.  Behavioral interventions performed were N/A.    ED NURSE PHONE NUMBER: *[phone]

## 2024-10-06 NOTE — ED NOTES
Bed: ED27  Expected date:   Expected time:   Means of arrival:   Comments:  A [age] F possible fall last night found down this morning probably femur fx shoulder injury no loc no thinners mild hypotension

## 2024-10-07 ENCOUNTER — APPOINTMENT (OUTPATIENT)
Dept: CARDIOLOGY | Facility: CLINIC | Age: 89
DRG: 521 | End: 2024-10-07
Attending: INTERNAL MEDICINE
Payer: MEDICARE

## 2024-10-07 ENCOUNTER — APPOINTMENT (OUTPATIENT)
Dept: CT IMAGING | Facility: CLINIC | Age: 89
DRG: 521 | End: 2024-10-07
Attending: INTERNAL MEDICINE
Payer: MEDICARE

## 2024-10-07 ENCOUNTER — ANESTHESIA (OUTPATIENT)
Dept: SURGERY | Facility: CLINIC | Age: 89
DRG: 521 | End: 2024-10-07
Payer: MEDICARE

## 2024-10-07 ENCOUNTER — ANESTHESIA EVENT (OUTPATIENT)
Dept: SURGERY | Facility: CLINIC | Age: 89
DRG: 521 | End: 2024-10-07
Payer: MEDICARE

## 2024-10-07 ENCOUNTER — APPOINTMENT (OUTPATIENT)
Dept: ULTRASOUND IMAGING | Facility: CLINIC | Age: 89
DRG: 521 | End: 2024-10-07
Attending: INTERNAL MEDICINE
Payer: MEDICARE

## 2024-10-07 LAB
ALBUMIN SERPL BCG-MCNC: 3.5 G/DL (ref 3.5–5.2)
ALBUMIN UR-MCNC: 70 MG/DL
ALP SERPL-CCNC: 57 U/L (ref 40–150)
ALT SERPL W P-5'-P-CCNC: 28 U/L (ref 0–50)
ANION GAP SERPL CALCULATED.3IONS-SCNC: 13 MMOL/L (ref 7–15)
ANION GAP SERPL CALCULATED.3IONS-SCNC: 13 MMOL/L (ref 7–15)
ANION GAP SERPL CALCULATED.3IONS-SCNC: 16 MMOL/L (ref 7–15)
APPEARANCE UR: ABNORMAL
AST SERPL W P-5'-P-CCNC: 43 U/L (ref 0–45)
ATRIAL RATE - MUSE: 78 BPM
BACTERIA #/AREA URNS HPF: ABNORMAL /HPF
BASE EXCESS BLDV CALC-SCNC: -2.6 MMOL/L (ref -3–3)
BASE EXCESS BLDV CALC-SCNC: -3.2 MMOL/L (ref -3–3)
BASE EXCESS BLDV CALC-SCNC: -3.3 MMOL/L (ref -3–3)
BILIRUB DIRECT SERPL-MCNC: 0.22 MG/DL (ref 0–0.3)
BILIRUB SERPL-MCNC: 0.6 MG/DL
BILIRUB UR QL STRIP: NEGATIVE
BUN SERPL-MCNC: 49.8 MG/DL (ref 8–23)
BUN SERPL-MCNC: 50.6 MG/DL (ref 8–23)
BUN SERPL-MCNC: 52.6 MG/DL (ref 8–23)
CALCIUM SERPL-MCNC: 8.4 MG/DL (ref 8.8–10.4)
CALCIUM SERPL-MCNC: 8.8 MG/DL (ref 8.8–10.4)
CALCIUM SERPL-MCNC: 9.1 MG/DL (ref 8.8–10.4)
CHLORIDE SERPL-SCNC: 101 MMOL/L (ref 98–107)
CHLORIDE SERPL-SCNC: 102 MMOL/L (ref 98–107)
CHLORIDE SERPL-SCNC: 98 MMOL/L (ref 98–107)
CK SERPL-CCNC: 393 U/L (ref 26–192)
COLOR UR AUTO: ABNORMAL
CREAT SERPL-MCNC: 1.06 MG/DL (ref 0.51–0.95)
CREAT SERPL-MCNC: 1.08 MG/DL (ref 0.51–0.95)
CREAT SERPL-MCNC: 1.09 MG/DL (ref 0.51–0.95)
DIASTOLIC BLOOD PRESSURE - MUSE: NORMAL MMHG
EGFRCR SERPLBLD CKD-EPI 2021: 47 ML/MIN/1.73M2
EGFRCR SERPLBLD CKD-EPI 2021: 48 ML/MIN/1.73M2
EGFRCR SERPLBLD CKD-EPI 2021: 49 ML/MIN/1.73M2
ERYTHROCYTE [DISTWIDTH] IN BLOOD BY AUTOMATED COUNT: 14 % (ref 10–15)
ERYTHROCYTE [DISTWIDTH] IN BLOOD BY AUTOMATED COUNT: 14.1 % (ref 10–15)
ERYTHROCYTE [DISTWIDTH] IN BLOOD BY AUTOMATED COUNT: 14.2 % (ref 10–15)
GLUCOSE BLDC GLUCOMTR-MCNC: 152 MG/DL (ref 70–99)
GLUCOSE BLDC GLUCOMTR-MCNC: 169 MG/DL (ref 70–99)
GLUCOSE SERPL-MCNC: 160 MG/DL (ref 70–99)
GLUCOSE SERPL-MCNC: 169 MG/DL (ref 70–99)
GLUCOSE SERPL-MCNC: 169 MG/DL (ref 70–99)
GLUCOSE UR STRIP-MCNC: NEGATIVE MG/DL
HCO3 BLDV-SCNC: 25 MMOL/L (ref 21–28)
HCO3 SERPL-SCNC: 20 MMOL/L (ref 22–29)
HCO3 SERPL-SCNC: 21 MMOL/L (ref 22–29)
HCO3 SERPL-SCNC: 22 MMOL/L (ref 22–29)
HCT VFR BLD AUTO: 38.8 % (ref 35–47)
HCT VFR BLD AUTO: 39.9 % (ref 35–47)
HCT VFR BLD AUTO: 43 % (ref 35–47)
HGB BLD-MCNC: 12.4 G/DL (ref 11.7–15.7)
HGB BLD-MCNC: 12.9 G/DL (ref 11.7–15.7)
HGB BLD-MCNC: 14.1 G/DL (ref 11.7–15.7)
HGB UR QL STRIP: ABNORMAL
HYALINE CASTS: 5 /LPF
INTERPRETATION ECG - MUSE: NORMAL
KETONES UR STRIP-MCNC: 20 MG/DL
LACTATE SERPL-SCNC: 2.3 MMOL/L (ref 0.7–2)
LACTATE SERPL-SCNC: 3.4 MMOL/L (ref 0.7–2)
LACTATE SERPL-SCNC: 3.5 MMOL/L (ref 0.7–2)
LACTATE SERPL-SCNC: 3.5 MMOL/L (ref 0.7–2)
LACTATE SERPL-SCNC: 3.8 MMOL/L (ref 0.7–2)
LACTATE SERPL-SCNC: 4 MMOL/L (ref 0.7–2)
LEUKOCYTE ESTERASE UR QL STRIP: NEGATIVE
LVEF ECHO: NORMAL
MCH RBC QN AUTO: 30.3 PG (ref 26.5–33)
MCH RBC QN AUTO: 30.6 PG (ref 26.5–33)
MCH RBC QN AUTO: 30.6 PG (ref 26.5–33)
MCHC RBC AUTO-ENTMCNC: 32 G/DL (ref 31.5–36.5)
MCHC RBC AUTO-ENTMCNC: 32.3 G/DL (ref 31.5–36.5)
MCHC RBC AUTO-ENTMCNC: 32.8 G/DL (ref 31.5–36.5)
MCV RBC AUTO: 93 FL (ref 78–100)
MCV RBC AUTO: 95 FL (ref 78–100)
MCV RBC AUTO: 95 FL (ref 78–100)
NITRATE UR QL: NEGATIVE
O2/TOTAL GAS SETTING VFR VENT: 2 %
O2/TOTAL GAS SETTING VFR VENT: 2 %
O2/TOTAL GAS SETTING VFR VENT: 5 %
OXYHGB MFR BLDV: 22 % (ref 70–75)
OXYHGB MFR BLDV: 25 % (ref 70–75)
OXYHGB MFR BLDV: 35 % (ref 70–75)
P AXIS - MUSE: 26 DEGREES
PCO2 BLDV: 53 MM HG (ref 40–50)
PCO2 BLDV: 56 MM HG (ref 40–50)
PCO2 BLDV: 58 MM HG (ref 40–50)
PH BLDV: 7.25 [PH] (ref 7.32–7.43)
PH BLDV: 7.26 [PH] (ref 7.32–7.43)
PH BLDV: 7.28 [PH] (ref 7.32–7.43)
PH UR STRIP: 6 [PH] (ref 5–7)
PLATELET # BLD AUTO: 104 10E3/UL (ref 150–450)
PLATELET # BLD AUTO: 108 10E3/UL (ref 150–450)
PLATELET # BLD AUTO: 89 10E3/UL (ref 150–450)
PO2 BLDV: 20 MM HG (ref 25–47)
PO2 BLDV: 22 MM HG (ref 25–47)
PO2 BLDV: 26 MM HG (ref 25–47)
POTASSIUM SERPL-SCNC: 5.1 MMOL/L (ref 3.4–5.3)
POTASSIUM SERPL-SCNC: 5.1 MMOL/L (ref 3.4–5.3)
POTASSIUM SERPL-SCNC: 5.3 MMOL/L (ref 3.4–5.3)
POTASSIUM SERPL-SCNC: 5.3 MMOL/L (ref 3.4–5.3)
POTASSIUM SERPL-SCNC: 5.4 MMOL/L (ref 3.4–5.3)
POTASSIUM SERPL-SCNC: 5.4 MMOL/L (ref 3.4–5.3)
POTASSIUM SERPL-SCNC: 5.6 MMOL/L (ref 3.4–5.3)
POTASSIUM SERPL-SCNC: 5.7 MMOL/L (ref 3.4–5.3)
PR INTERVAL - MUSE: 118 MS
PROT SERPL-MCNC: 6 G/DL (ref 6.4–8.3)
QRS DURATION - MUSE: 94 MS
QT - MUSE: 404 MS
QTC - MUSE: 460 MS
R AXIS - MUSE: 80 DEGREES
RBC # BLD AUTO: 4.09 10E6/UL (ref 3.8–5.2)
RBC # BLD AUTO: 4.22 10E6/UL (ref 3.8–5.2)
RBC # BLD AUTO: 4.61 10E6/UL (ref 3.8–5.2)
RBC URINE: 14 /HPF
SAO2 % BLDV: 22.1 % (ref 70–75)
SAO2 % BLDV: 25.5 % (ref 70–75)
SAO2 % BLDV: 35.9 % (ref 70–75)
SODIUM SERPL-SCNC: 134 MMOL/L (ref 135–145)
SODIUM SERPL-SCNC: 136 MMOL/L (ref 135–145)
SODIUM SERPL-SCNC: 136 MMOL/L (ref 135–145)
SP GR UR STRIP: 1.02 (ref 1–1.03)
SQUAMOUS EPITHELIAL: 11 /HPF
SYSTOLIC BLOOD PRESSURE - MUSE: NORMAL MMHG
T AXIS - MUSE: 76 DEGREES
TROPONIN T SERPL HS-MCNC: 177 NG/L
TROPONIN T SERPL HS-MCNC: 184 NG/L
TROPONIN T SERPL HS-MCNC: 206 NG/L
UROBILINOGEN UR STRIP-MCNC: 4 MG/DL
VENTRICULAR RATE- MUSE: 78 BPM
WBC # BLD AUTO: 11.7 10E3/UL (ref 4–11)
WBC # BLD AUTO: 11.9 10E3/UL (ref 4–11)
WBC # BLD AUTO: 9.2 10E3/UL (ref 4–11)
WBC CLUMPS #/AREA URNS HPF: PRESENT /HPF
WBC URINE: >182 /HPF

## 2024-10-07 PROCEDURE — 250N000011 HC RX IP 250 OP 636: Performed by: INTERNAL MEDICINE

## 2024-10-07 PROCEDURE — 82550 ASSAY OF CK (CPK): CPT | Performed by: NURSE PRACTITIONER

## 2024-10-07 PROCEDURE — 36415 COLL VENOUS BLD VENIPUNCTURE: CPT | Performed by: NURSE PRACTITIONER

## 2024-10-07 PROCEDURE — 250N000013 HC RX MED GY IP 250 OP 250 PS 637: Performed by: STUDENT IN AN ORGANIZED HEALTH CARE EDUCATION/TRAINING PROGRAM

## 2024-10-07 PROCEDURE — 87088 URINE BACTERIA CULTURE: CPT | Performed by: INTERNAL MEDICINE

## 2024-10-07 PROCEDURE — 99223 1ST HOSP IP/OBS HIGH 75: CPT | Mod: 25 | Performed by: INTERNAL MEDICINE

## 2024-10-07 PROCEDURE — 210N000002 HC R&B HEART CARE

## 2024-10-07 PROCEDURE — 99207 PR NO BILLABLE SERVICE THIS VISIT: CPT | Performed by: NURSE PRACTITIONER

## 2024-10-07 PROCEDURE — 82805 BLOOD GASES W/O2 SATURATION: CPT | Performed by: NURSE PRACTITIONER

## 2024-10-07 PROCEDURE — 80048 BASIC METABOLIC PNL TOTAL CA: CPT | Performed by: INTERNAL MEDICINE

## 2024-10-07 PROCEDURE — 250N000009 HC RX 250: Performed by: INTERNAL MEDICINE

## 2024-10-07 PROCEDURE — 99418 PROLNG IP/OBS E/M EA 15 MIN: CPT | Performed by: INTERNAL MEDICINE

## 2024-10-07 PROCEDURE — 83605 ASSAY OF LACTIC ACID: CPT | Performed by: NURSE PRACTITIONER

## 2024-10-07 PROCEDURE — 258N000003 HC RX IP 258 OP 636: Performed by: INTERNAL MEDICINE

## 2024-10-07 PROCEDURE — 99233 SBSQ HOSP IP/OBS HIGH 50: CPT | Performed by: INTERNAL MEDICINE

## 2024-10-07 PROCEDURE — 87641 MR-STAPH DNA AMP PROBE: CPT | Performed by: INTERNAL MEDICINE

## 2024-10-07 PROCEDURE — 36415 COLL VENOUS BLD VENIPUNCTURE: CPT | Performed by: INTERNAL MEDICINE

## 2024-10-07 PROCEDURE — 74177 CT ABD & PELVIS W/CONTRAST: CPT | Mod: MF

## 2024-10-07 PROCEDURE — 80048 BASIC METABOLIC PNL TOTAL CA: CPT | Performed by: NURSE PRACTITIONER

## 2024-10-07 PROCEDURE — 81001 URINALYSIS AUTO W/SCOPE: CPT | Performed by: INTERNAL MEDICINE

## 2024-10-07 PROCEDURE — 84484 ASSAY OF TROPONIN QUANT: CPT | Performed by: INTERNAL MEDICINE

## 2024-10-07 PROCEDURE — 255N000002 HC RX 255 OP 636: Performed by: INTERNAL MEDICINE

## 2024-10-07 PROCEDURE — 250N000013 HC RX MED GY IP 250 OP 250 PS 637: Performed by: HOSPITALIST

## 2024-10-07 PROCEDURE — 250N000011 HC RX IP 250 OP 636: Performed by: HOSPITALIST

## 2024-10-07 PROCEDURE — 87040 BLOOD CULTURE FOR BACTERIA: CPT | Performed by: INTERNAL MEDICINE

## 2024-10-07 PROCEDURE — 82248 BILIRUBIN DIRECT: CPT | Performed by: NURSE PRACTITIONER

## 2024-10-07 PROCEDURE — 82550 ASSAY OF CK (CPK): CPT | Performed by: INTERNAL MEDICINE

## 2024-10-07 PROCEDURE — 93010 ELECTROCARDIOGRAM REPORT: CPT | Performed by: INTERNAL MEDICINE

## 2024-10-07 PROCEDURE — 258N000003 HC RX IP 258 OP 636: Performed by: NURSE PRACTITIONER

## 2024-10-07 PROCEDURE — 83605 ASSAY OF LACTIC ACID: CPT | Performed by: INTERNAL MEDICINE

## 2024-10-07 PROCEDURE — 93005 ELECTROCARDIOGRAM TRACING: CPT

## 2024-10-07 PROCEDURE — 93306 TTE W/DOPPLER COMPLETE: CPT | Mod: 26 | Performed by: INTERNAL MEDICINE

## 2024-10-07 PROCEDURE — 84484 ASSAY OF TROPONIN QUANT: CPT | Performed by: NURSE PRACTITIONER

## 2024-10-07 PROCEDURE — 76705 ECHO EXAM OF ABDOMEN: CPT

## 2024-10-07 PROCEDURE — 85027 COMPLETE CBC AUTOMATED: CPT | Performed by: NURSE PRACTITIONER

## 2024-10-07 PROCEDURE — 82805 BLOOD GASES W/O2 SATURATION: CPT | Performed by: INTERNAL MEDICINE

## 2024-10-07 PROCEDURE — 85027 COMPLETE CBC AUTOMATED: CPT | Performed by: INTERNAL MEDICINE

## 2024-10-07 PROCEDURE — 83735 ASSAY OF MAGNESIUM: CPT | Performed by: INTERNAL MEDICINE

## 2024-10-07 RX ORDER — CEFTRIAXONE 1 G/1
1 INJECTION, POWDER, FOR SOLUTION INTRAMUSCULAR; INTRAVENOUS EVERY 24 HOURS
Status: DISCONTINUED | OUTPATIENT
Start: 2024-10-07 | End: 2024-10-07 | Stop reason: ALTCHOICE

## 2024-10-07 RX ORDER — DEXTROSE MONOHYDRATE 25 G/50ML
25 INJECTION, SOLUTION INTRAVENOUS ONCE
Status: DISCONTINUED | OUTPATIENT
Start: 2024-10-07 | End: 2024-10-07

## 2024-10-07 RX ORDER — NICOTINE POLACRILEX 4 MG
15-30 LOZENGE BUCCAL
Status: DISCONTINUED | OUTPATIENT
Start: 2024-10-07 | End: 2024-10-15 | Stop reason: HOSPADM

## 2024-10-07 RX ORDER — PIPERACILLIN SODIUM, TAZOBACTAM SODIUM 2; .25 G/10ML; G/10ML
2.25 INJECTION, POWDER, LYOPHILIZED, FOR SOLUTION INTRAVENOUS EVERY 8 HOURS
Status: DISCONTINUED | OUTPATIENT
Start: 2024-10-07 | End: 2024-10-07

## 2024-10-07 RX ORDER — DEXTROSE MONOHYDRATE 25 G/50ML
25-50 INJECTION, SOLUTION INTRAVENOUS
Status: DISCONTINUED | OUTPATIENT
Start: 2024-10-07 | End: 2024-10-15 | Stop reason: HOSPADM

## 2024-10-07 RX ORDER — PIPERACILLIN SODIUM, TAZOBACTAM SODIUM 2; .25 G/10ML; G/10ML
2.25 INJECTION, POWDER, LYOPHILIZED, FOR SOLUTION INTRAVENOUS EVERY 6 HOURS
Status: DISCONTINUED | OUTPATIENT
Start: 2024-10-07 | End: 2024-10-10

## 2024-10-07 RX ORDER — IOPAMIDOL 755 MG/ML
56 INJECTION, SOLUTION INTRAVASCULAR ONCE
Status: COMPLETED | OUTPATIENT
Start: 2024-10-07 | End: 2024-10-07

## 2024-10-07 RX ADMIN — IOPAMIDOL 63 ML: 755 INJECTION, SOLUTION INTRAVENOUS at 14:19

## 2024-10-07 RX ADMIN — PERFLUTREN 4 ML: 6.52 INJECTION, SUSPENSION INTRAVENOUS at 09:59

## 2024-10-07 RX ADMIN — PIPERACILLIN AND TAZOBACTAM 2.25 G: 2; .25 INJECTION, POWDER, FOR SOLUTION INTRAVENOUS at 21:50

## 2024-10-07 RX ADMIN — SODIUM CHLORIDE 250 ML: 9 INJECTION, SOLUTION INTRAVENOUS at 08:51

## 2024-10-07 RX ADMIN — DEXTROSE AND SODIUM CHLORIDE: 5; 900 INJECTION, SOLUTION INTRAVENOUS at 11:41

## 2024-10-07 RX ADMIN — CEFTRIAXONE SODIUM 1 G: 1 INJECTION, POWDER, FOR SOLUTION INTRAMUSCULAR; INTRAVENOUS at 11:41

## 2024-10-07 RX ADMIN — ACETAMINOPHEN 650 MG: 650 SUPPOSITORY RECTAL at 15:48

## 2024-10-07 RX ADMIN — ONDANSETRON 4 MG: 2 INJECTION INTRAMUSCULAR; INTRAVENOUS at 12:33

## 2024-10-07 RX ADMIN — SODIUM CHLORIDE 83 ML: 9 INJECTION, SOLUTION INTRAVENOUS at 14:19

## 2024-10-07 RX ADMIN — SODIUM CHLORIDE 250 ML: 9 INJECTION, SOLUTION INTRAVENOUS at 05:54

## 2024-10-07 RX ADMIN — OXYCODONE HYDROCHLORIDE 2.5 MG: 5 TABLET ORAL at 00:25

## 2024-10-07 RX ADMIN — SODIUM CHLORIDE 250 ML: 9 INJECTION, SOLUTION INTRAVENOUS at 13:06

## 2024-10-07 RX ADMIN — ACETAMINOPHEN 650 MG: 650 SUPPOSITORY RECTAL at 21:20

## 2024-10-07 ASSESSMENT — ACTIVITIES OF DAILY LIVING (ADL)
ADLS_ACUITY_SCORE: 50
ADLS_ACUITY_SCORE: 46
ADLS_ACUITY_SCORE: 50
ADLS_ACUITY_SCORE: 46
ADLS_ACUITY_SCORE: 50
ADLS_ACUITY_SCORE: 54
ADLS_ACUITY_SCORE: 54
ADLS_ACUITY_SCORE: 50
ADLS_ACUITY_SCORE: 46
ADLS_ACUITY_SCORE: 44
ADLS_ACUITY_SCORE: 50
ADLS_ACUITY_SCORE: 46
ADLS_ACUITY_SCORE: 46
ADLS_ACUITY_SCORE: 54
ADLS_ACUITY_SCORE: 50
ADLS_ACUITY_SCORE: 54
ADLS_ACUITY_SCORE: 46
ADLS_ACUITY_SCORE: 54
ADLS_ACUITY_SCORE: 50
ADLS_ACUITY_SCORE: 54
ADLS_ACUITY_SCORE: 46

## 2024-10-07 ASSESSMENT — LIFESTYLE VARIABLES: TOBACCO_USE: 0

## 2024-10-07 ASSESSMENT — COPD QUESTIONNAIRES: COPD: 0

## 2024-10-07 NOTE — PLAN OF CARE
Orthopedic Plan of Care    Patient tentatively scheduled for a left hip hemiarthroplasty for management of a femoral neck fracture later today per previous discussion on 10/6/24. Patient had an RRT overnight for chest pain and cardiology saw her this morning. Cardiology is recommending an echo and troponin trend. If these return reassuring, she will be okay to proceed with surgery. Pending timing and clearance, we will plan for her left hip procedure later today vs tomorrow.     Addendum:  Contacted by charge RN that patient had RRT. She was found to have elevated lactic acid and hyperkalemia. Will move surgery tentatively to tomorrow (10/8/24) provided she is medically optimized and cardiology has cleared her. NPO at midnight, okay for diet today from an orthopedic standpoint but will defer to medicine team to order.    Aixa Pink PA-C  [facility]

## 2024-10-07 NOTE — CONSULTS
[facility]    Cardiology Consultation     Date of Admission:  10/6/2024    Assessment & Plan   Benita Hubbard is a 89 year old female who was admitted on 10/6/2024.    Mild troponin elevation, likely demand related  Left hip neck fracture   Hypertension  History of mild non-ischemic cardiomyopathy     Plan:  The patient's mildly elevated troponin is likely stress-induced.  She has been very active and reported no recent chest discomfort or shortness of breath.  Coronary angiogram in 2014 showed completely normal heart arteries.  Therefore, I doubt there is significant obstructive coronary disease.  Will obtain echocardiogram and trend the troponin.  If the echo shows stable LV function and the troponin trend is flat, recommend proceeding with hip replacement.    Resume beta-blocker.  Will consider adding low-dose aspirin to her regimen.    We appreciate opportunity to participate in her care    Salvatore Troncoso MD    High complexity       Primary Care Physician   Benjamin Chambers    Reason for Consult   Reason for consult: Mild troponin elevation following hip fracture    History of Present Illness   Benita Hubbard is a 89 year old female with history of hypertension, mild nonischemic cardiomyopathy (EF 50 to 50%) who is brought to the emergency department yesterday after she was found down in her apartment.  She is very active and lives independently.  She exercises daily.  She does not recall what happened but she presumably had a mechanical fall.  She unfortunately suffered a left hip fracture.    The patient had her electrocardiogram as well as laboratory studies including troponin.  The high-sensitivity troponin is mildly elevated at 206.  Her ECG shows incomplete right bundle branch block and T wave inversions in anterior precordial leads.  She is currently sedated from pain medications.    Past Medical History   Past Medical History:   Diagnosis Date    Cardiomyopathy due  "to hypertension (H)     Cardiomyopathy, nonischemic (H)     Coronary artery disease     Diverticulitis of colon     Hypertension     Neuropathy     Osteoporosis     Palmar plantar dysesthesia     Small fiber neuropathy     UTI (lower urinary tract infection)        Past Surgical History   Past Surgical History:   Procedure Laterality Date    CORONARY ANGIOGRAPHY ADULT ORDER  4/14/14    normal Coronary arteries    HYSTERECTOMY, PAP NO LONGER INDICATED      ovaries still in place    ORTHOPEDIC SURGERY      Left hand \"hard lumps\" excised    SEPTOPLASTY      SURGICAL HISTORY OF -       vein stripping right leg       Prior to Admission Medications   Prior to Admission Medications   Prescriptions Last Dose Informant Patient Reported? Taking?   acetaminophen (TYLENOL) 325 MG tablet  at PRN Son No Yes   Sig: Take 2 tablets (650 mg) by mouth 2 times daily as needed for mild pain ; maximum 4 grams/day of acetaminophen from all sources.   alendronate (FOSAMAX) 70 MG tablet 9/28/2024 Son Yes Yes   Sig: Take 70 mg by mouth every 7 days Takes on Saturdays   calcium-vitamin D (CALTRATE) 600-400 MG-UNIT per tablet 10/4/2024 Son Yes Yes   Sig: Take 1 tablet by mouth daily   lisinopril (PRINIVIL,ZESTRIL) 5 MG tablet 10/4/2024 Son No Yes   Sig: Take 1 tablet (5 mg) by mouth daily   metoprolol succinate ER (TOPROL-XL) 25 MG 24 hr tablet 10/4/2024 Son No Yes   Sig: Take 1 tablet (25 mg) by mouth every evening   mometasone (NASONEX) 50 MCG/ACT nasal spray  at PRN Son Yes Yes   Sig: Spray 2 sprays into both nostrils daily as needed   pregabalin (LYRICA) 50 MG capsule 10/4/2024 Son Yes Yes   Sig: Take 50 mg by mouth every evening      Facility-Administered Medications: None     Current Facility-Administered Medications   Medication Dose Route Frequency Provider Last Rate Last Admin    acetaminophen (TYLENOL) tablet 650 mg  650 mg Oral Q4H PRN Karma Emery MD        Or    acetaminophen (TYLENOL) Suppository 650 mg  650 mg Rectal Q4H " PRN Karma Emery MD        calcium carbonate (TUMS) chewable tablet 1,000 mg  1,000 mg Oral 4x Daily PRN Karma Emery MD        dextrose 5% and 0.9% NaCl infusion   Intravenous Continuous Kathryn Stafford  mL/hr at 10/07/24 0849 Rate Change at 10/07/24 0849    fluticasone (FLONASE) 50 MCG/ACT spray 2 spray  2 spray Both Nostrils Daily PRN Karma Emery MD        [Held by provider] HYDROmorphone (DILAUDID) injection 0.2 mg  0.2 mg Intravenous Q3H PRN Nunu Jones PA-C   0.2 mg at 10/06/24 1721    lidocaine (LMX4) cream   Topical Q1H PRN Karma Emery MD        lidocaine 1 % 0.1-1 mL  0.1-1 mL Other Q1H PRN Karma Emery MD        methocarbamol (ROBAXIN) half-tab 250 mg  250 mg Oral 4x Daily PRN Nunu Jones PA-C        naloxone (NARCAN) injection 0.2 mg  0.2 mg Intravenous Q2 Min PRN Nunu Jones PA-C        Or    naloxone (NARCAN) injection 0.4 mg  0.4 mg Intravenous Q2 Min PRN Nunu Jones PA-C        Or    naloxone (NARCAN) injection 0.2 mg  0.2 mg Intramuscular Q2 Min PRN Nunu Jones PA-C        Or    naloxone (NARCAN) injection 0.4 mg  0.4 mg Intramuscular Q2 Min PRN Nunu Jones PA-C        ondansetron (ZOFRAN ODT) ODT tab 4 mg  4 mg Oral Q6H PRN Karma Emery MD        Or    ondansetron (ZOFRAN) injection 4 mg  4 mg Intravenous Q6H PRN Karma Emery MD        [Held by provider] oxyCODONE IR (ROXICODONE) half-tab 2.5 mg  2.5 mg Oral Q4H PRN Nunu Jones PA-C   2.5 mg at 10/07/24 0025    Or    [Held by provider] oxyCODONE (ROXICODONE) tablet 5 mg  5 mg Oral Q4H PRN Nunu Jones PA-C        senna-docusate (SENOKOT-S/PERICOLACE) 8.6-50 MG per tablet 1 tablet  1 tablet Oral BID PRN Karma Emery MD        Or    senna-docusate (SENOKOT-S/PERICOLACE) 8.6-50 MG per tablet 2 tablet  2 tablet Oral BID PRN Karma Emery MD        sodium chloride (PF) 0.9% PF flush 3 mL  3 mL Intracatheter Q8H Karma Emery MD   3 mL  at 10/06/24 1921    sodium chloride (PF) 0.9% PF flush 3 mL  3 mL Intracatheter q1 min prn Karma Emery MD        sodium chloride 0.9% BOLUS 250 mL  250 mL Intravenous Once Kathryn Stafford  mL/hr at 10/07/24 0851 250 mL at 10/07/24 0851     Current Facility-Administered Medications   Medication Dose Route Frequency Provider Last Rate Last Admin    acetaminophen (TYLENOL) tablet 650 mg  650 mg Oral Q4H PRN Karma Emery MD        Or    acetaminophen (TYLENOL) Suppository 650 mg  650 mg Rectal Q4H PRN Karma Emery MD        calcium carbonate (TUMS) chewable tablet 1,000 mg  1,000 mg Oral 4x Daily PRN Karma Emery MD        dextrose 5% and 0.9% NaCl infusion   Intravenous Continuous Kathryn Stafford  mL/hr at 10/07/24 0849 Rate Change at 10/07/24 0849    fluticasone (FLONASE) 50 MCG/ACT spray 2 spray  2 spray Both Nostrils Daily PRN Karma Emery MD        [Held by provider] HYDROmorphone (DILAUDID) injection 0.2 mg  0.2 mg Intravenous Q3H PRN Nunu Jones PA-C   0.2 mg at 10/06/24 1721    lidocaine (LMX4) cream   Topical Q1H PRN Karma Emery MD        lidocaine 1 % 0.1-1 mL  0.1-1 mL Other Q1H PRN Karma Emery MD        methocarbamol (ROBAXIN) half-tab 250 mg  250 mg Oral 4x Daily PRN Nunu Jones PA-C        naloxone (NARCAN) injection 0.2 mg  0.2 mg Intravenous Q2 Min PRN Nunu Jones PA-C        Or    naloxone (NARCAN) injection 0.4 mg  0.4 mg Intravenous Q2 Min PRN Nunu Jones PA-C        Or    naloxone (NARCAN) injection 0.2 mg  0.2 mg Intramuscular Q2 Min PRN Nunu Jones PA-C        Or    naloxone (NARCAN) injection 0.4 mg  0.4 mg Intramuscular Q2 Min PRN Nunu Jones PA-C        ondansetron (ZOFRAN ODT) ODT tab 4 mg  4 mg Oral Q6H PRN Karma Emery MD        Or    ondansetron (ZOFRAN) injection 4 mg  4 mg Intravenous Q6H PRN Karma Emery MD        [Held by provider] oxyCODONE IR (ROXICODONE) half-tab 2.5 mg  2.5  mg Oral Q4H PRN Nunu Jones PA-C   2.5 mg at 10/07/24 0025    Or    [Held by provider] oxyCODONE (ROXICODONE) tablet 5 mg  5 mg Oral Q4H PRN Nunu Jones PA-C        senna-docusate (SENOKOT-S/PERICOLACE) 8.6-50 MG per tablet 1 tablet  1 tablet Oral BID PRN Karma Emery MD        Or    senna-docusate (SENOKOT-S/PERICOLACE) 8.6-50 MG per tablet 2 tablet  2 tablet Oral BID PRN Karma Emery MD        sodium chloride (PF) 0.9% PF flush 3 mL  3 mL Intracatheter Q8H Karma Emery MD   3 mL at 10/06/24 1921    sodium chloride (PF) 0.9% PF flush 3 mL  3 mL Intracatheter q1 min prn Karma Emery MD        sodium chloride 0.9% BOLUS 250 mL  250 mL Intravenous Once Kathryn Stafford  mL/hr at 10/07/24 0851 250 mL at 10/07/24 0851     Allergies   Allergies   Allergen Reactions    Cephalexin Other (See Comments)     Dizzy and chest tightness    Ciprofloxacin Other (See Comments)     Neuropathy bottom of feet      Nitrofurantoin Other (See Comments)     Neuropathy bottom of feet    Ofloxacin GI Disturbance     Numbness    Sulfa Antibiotics      Coated tongue       Social History    reports that she quit smoking about 55 years ago. She has never used smokeless tobacco. She reports current alcohol use. She reports that she does not use drugs.    Family History   I have reviewed this patient's family history and updated it with pertinent information if needed.  Family History   Problem Relation Age of Onset    Cerebrovascular Disease Mother     Cancer - colorectal Father     Heart Disease No family hx of           Review of Systems   A comprehensive review of system was performed and is negative other than that noted in the HPI or here.     Physical Exam   Vital Signs with Ranges  Temp:  [94.6  F (34.8  C)-98.5  F (36.9  C)] 98.5  F (36.9  C)  Pulse:  [69-91] 91  Resp:  [4-20] 18  BP: ()/(60-90) 112/81  SpO2:  [90 %-100 %] 94 %  Wt Readings from Last 4 Encounters:   09/20/21 57.3 kg (126  "lb 6.4 oz)   10/03/17 63.6 kg (140 lb 3.2 oz)   10/05/16 65.8 kg (145 lb)   09/21/15 65.3 kg (144 lb)     I/O last 3 completed shifts:  In: 2083 [P.O.:240; I.V.:343; IV Piggyback:1500]  Out: 550 [Urine:550]      Vitals: /81 (BP Location: Right arm, Patient Position: Semi-Valenzuela's, Cuff Size: Adult Small)   Pulse 91   Temp 98.5  F (36.9  C) (Axillary)   Resp 18   SpO2 94%     Physical Exam:   General - Alert and oriented to time place and person in no acute distress  Eyes - No scleral icterus  HEENT - Neck supple, moist mucous membranes  Cardiovascular -regular rhythm, no murmurs  Extremities - There is no edema  Respiratory -clear  Skin - No pallor or cyanosis  Gastrointestinal - Non tender and non distended without rebound or guarding  Psych - Appropriate affect   Neurological - No gross motor neurological focal deficits    No lab results found in last 7 days.    Invalid input(s): \"TROPONINIES\"    Recent Labs   Lab 10/07/24  0737 10/07/24  0537 10/06/24  2251 10/06/24  1248 10/06/24  1158   WBC 11.9*  --   --  10.6  --    HGB 14.1  --   --  13.9  --    MCV 93  --   --  94  --    *  --   --  112*  --    *  --  134*  --  133*   POTASSIUM 5.6*  --  5.3  --  5.4*   CHLORIDE 98  --  96*  --  96*   CO2 20*  --  19*  --  22   BUN 50.6*  --  44.9*  --  41.6*   CR 1.08*  --  0.96*  --  0.97*   GFRESTIMATED 48*  --  55*  --  54*   ANIONGAP 16*  --  19*  --  15   SHORTY 9.1  --  9.1  --  9.4   * 169* 163*  --  122*   ALBUMIN  --   --   --   --  3.9   PROTTOTAL  --   --   --   --  6.5   BILITOTAL  --   --   --   --  0.5   ALKPHOS  --   --   --   --  63   ALT  --   --   --   --  28     Recent Labs   Lab Test 09/29/17  0902   CHOL 193   HDL 64   *   TRIG 70     Recent Labs   Lab 10/07/24  0737 10/06/24  1248   WBC 11.9* 10.6   HGB 14.1 13.9   HCT 43.0 42.5   MCV 93 94   * 112*     Recent Labs   Lab 10/07/24  0737   PHV 7.28*   PO2V 20*   PCO2V 53*   HCO3V 25     No results for input(s): " "\"NTBNPI\", \"NTBNP\" in the last 168 hours.  No results for input(s): \"DD\" in the last 168 hours.  No results for input(s): \"SED\", \"CRP\" in the last 168 hours.  Recent Labs   Lab 10/07/24  0737 10/06/24  1248   * 112*     No results for input(s): \"TSH\" in the last 168 hours.  No results for input(s): \"COLOR\", \"APPEARANCE\", \"URINEGLC\", \"URINEBILI\", \"URINEKETONE\", \"SG\", \"UBLD\", \"URINEPH\", \"PROTEIN\", \"UROBILINOGEN\", \"NITRITE\", \"LEUKEST\", \"RBCU\", \"WBCU\" in the last 168 hours.    Imaging:  Recent Results (from the past 48 hour(s))   CT Cervical Spine w/o Contrast    Narrative    EXAM: CT CERVICAL SPINE W/O CONTRAST  LOCATION: [facility]  DATE: 10/6/2024    INDICATION: fall dementia. Injury. Pain.  COMPARISON: 19 September 2021 cervical spine CT  TECHNIQUE: Routine CT Cervical Spine without IV contrast. Multiplanar reformats. Dose reduction techniques were used.    FINDINGS:  Mild endplate concavities at C7 unchanged. Upper endplate concavity at T1 is stable. Chronic T4 upper lower endplate fractures with with sclerotic bone remodeling unchanged but incompletely visualized. No acute fractures. Prevertebral soft tissues   unremarkable. No extraspinal abnormality.     Craniovertebral junction and C1-C2: Normal.    C2-C3: Very small central disc protrusion. Normal facets. No spinal canal or neural foraminal stenosis.     C3-C4: Facet DJD and mild annular disc bulge. Patent central canal and foramen.     C4-C5: 2.5 mm degenerative anterolisthesis. Facet arthropathy. Moderate right and mild left foraminal stenosis. Patent central canal.     C5-C6: Disc osteophyte. Facet DJD. Severe right and moderate left foraminal stenosis. Patent central canal.    C6-C7: 1.5 mm degenerative anterolisthesis. Disc and facet degenerative changes. Patent central canal. Mild bilateral foraminal stenosis.     C7-T1: Facet DJD. Patent central canal and foramina.       Impression    IMPRESSION:  1.  Lower cervical and " thoracic residual from previous trauma. No interval change.    2.  Multilevel cervical spondylosis.    3.  No acute fractures or evidence of traumatic malalignment.   Head CT w/o contrast    Narrative    EXAM: CT HEAD W/O CONTRAST  LOCATION: [facility]  DATE: 10/6/2024    INDICATION: fall head injiury. Pain.  COMPARISON: Head CT 19 September 2021]  TECHNIQUE: Routine CT Head without IV contrast. Multiplanar reformats. Dose reduction techniques were used.    FINDINGS:  INTRACRANIAL CONTENTS: No intracranial hemorrhage, extraaxial collection, or mass effect.  No CT evidence of acute infarct. Mild to moderate presumed chronic small vessel ischemic changes. Moderate generalized volume loss. No hydrocephalus. Tiny, chronic   infarcts in the cerebellar hemispheres. Skull base vascular calcifications.    VISUALIZED ORBITS/SINUSES/MASTOIDS: Prior bilateral cataract surgery. Visualized portions of the orbits are otherwise unremarkable. Chronic opacification of the left maxillary sinus with central dystrophic calcifications and chronic reactive osteitis. No   bone destruction. Mild anterior left ethmoid membrane thickening. No middle ear or mastoid effusion.    BONES/SOFT TISSUES: No acute abnormality. Mild left TMJ arthropathy.      Impression    IMPRESSION:  1.  No CT evidence for acute intracranial process.  2.  Brain atrophy and presumed chronic microvascular ischemic changes as above.   XR Pelvis w Hip Left 1 View    Narrative    EXAM: XR PELVIS AND HIP LEFT 1 VIEW  LOCATION: [facility]  DATE: 10/6/2024    INDICATION: eval for fx  COMPARISON: None.      Impression    IMPRESSION: Acute mildly displaced left femoral neck fracture. Left femoral head remains appropriately located within the acetabulum. Asymmetric contour deformity cortical irregularity at the junction of the left pubic tubercle and left superior pubic   ramus as well as along the left inferior pubic ramus,  suggestive of additional fractures. Mild joint space narrowing both hips. Severe diffuse osseous demineralization. Limited evaluation of the sacrum secondary to overlying bowel gas.   XR Chest 1 View    Narrative    EXAM: XR CHEST 1 VIEW  LOCATION: [facility]  DATE: 10/6/2024    INDICATION: fall hypoixia  COMPARISON: CT of the chest 9/9/2021      Impression    IMPRESSION:     Cardiac enlargement with particular enlargement of the left ventricular heart border. Mild aortic atheromatous calcifications. Vascular pedicle with is not increased. Lucency at the left tracheobronchial angle reflects retained air in the esophagus and   suggests esophageal dysmotility.    Symmetric lung inflation. There are no focal airspace opacities. No findings to suggest interstitial lung edema. No menisci in the bases to indicate pleural effusions.    Disc space narrowing particularly of the mid thoracic vertebra and marginal osteophytes. No acute displaced rib fractures are detected.   XR Shoulder Left G/E 3 Views    Narrative    EXAM: XR SHOULDER LEFT G/E 3 VIEWS  LOCATION: [facility]  DATE: 10/6/2024    INDICATION: fall shoulder pain  COMPARISON: None.      Impression    IMPRESSION: No fracture or malalignment. Soft tissue calcination projecting over the greater tuberosity which is favored to represent calcific tendinitis. Diffuse osseous demineralization which limits evaluation for nondisplaced fractures and subtle   osseous lesions.       Echo:  No results found for this or any previous visit (from the past 4320 hour(s)).    Clinically Significant Risk Factors Present on Admission        # Hyperkalemia: Highest K = 5.6 mmol/L in last 2 days, will monitor as appropriate  # Hyponatremia: Lowest Na = 133 mmol/L in last 2 days, will monitor as appropriate     # Anion Gap Metabolic Acidosis: Highest Anion Gap = 19 mmol/L in last 2 days, will monitor and treat as appropriate    #  Thrombocytopenia: Lowest platelets = 104 in last 2 days, will monitor for bleeding   # Hypertension: Noted on problem list

## 2024-10-07 NOTE — PLAN OF CARE
10/6/24 2300-10/7/24 0300  Trauma/Ortho/Medical (Choose one)  Ortho.     Diagnosis: Mechanical Fall;  L. Femoral Neck Fracture  Mental Status: A&Ox1 to self; confusion  Activity/dangle: Bedrest  Diet: NPO at midnight.  Pain: oxy 2.5mg given x1  Torres/Voiding: External catheter.  Tele/Restraints/Iso: no.  02/LDA: PIV infusing.  D/C Date: Pending.  Other Info: Pt had a fall at home, came with open wound on left anterior lower leg.Covered with mepilex. VSS on room air, CMS intact. Unable to follow commands for thorough neuro check.   Failed dysphagia screen.     Candidate for Left hemiarthroplasty. CHG wiped this AM.

## 2024-10-07 NOTE — PLAN OF CARE
Mental Status: A&O to self  Activity/dangle: Bedrest  Diet: NPO  Pain: Denies pain  Torres/Voiding: Torres in place, patent  Tele/Restraints/Iso: 6% Dextrose & 0.9 NaCL infusing at 75 mL/hr.  02/LDA: 2L O2 Oxymask  D/C Date: TBD  Other Info: RRT called 2x, given 2x 250 mL/hr bolus. Report given to Nurse at CICU, pt transferred 3344.

## 2024-10-07 NOTE — PROGRESS NOTES
Responded to Rapid Response call.   RRT was called due to Chest pain.   Patient was on RA, RR 18/min, SpO2 90%.   Respiratory interventions included placed on 2 L of O2.   Patient Doing fine  and we will continue to follow      Landy Almonte RT,  10/7/2024 5:53 AM

## 2024-10-07 NOTE — PLAN OF CARE
Goal Outcome Evaluation:      Plan of Care Reviewed With: patient, child, family        Pt arrived to unit at 1445. Alert to self, lethargic, intermittently follows commands. Frequent reminders needed for where she is and why she is here. Grimacing in pain with minimal movement, prn tylenol suppository given, ice pack applied. VSS, O2 stable on 4L oxymask. Sip of water given per family request, pt immediately coughed and was unable to safely swallow, to remain NPO. Family reports poor swallowing at baseline. Frequent oral cares. IVF per orders. Torres patent. Scattered bruising, small open area on left shin, mepilex in place, scant drainage. IV dressing changed, small skin tear noted under wings of IV catheter, guaze applied. Plan for possible surgery 10/8 pending clinical improvement. Family at bedside.

## 2024-10-07 NOTE — CODE/RAPID RESPONSE
[facility]    House HARRIET RRT Note  10/7/2024   Time Called: 0533    RRT called for: Hypoxia    Assessment & Plan     Acute hypoxic respiratory failure possibly 2/2 medication effect (oxycodone 0025).  Mild hypotension possibly 2/2 intravascular depletion.    - Upon arrival, pt lying in bed, eyes closed, in no overt distress.  Nursing notes pt on continuous pulse oximetry; however, no longer able to obtain pulse oximetry prompting RRT.  Nursing had placed pt on 6L O2 via oxymask to obtain O2 sat > 92%.  Flying squad able to wean down O2 to 2L at time of arrival as pt's O2 sat remained 95%.  Pt's initial SBP 99 with subsequent 88.  Pt's oral mucosa profoundly dry.     INTERVENTIONS:  - 250 ml NS bolus over 1 hour  - Stat BMP, VBG, CBC, CK, lactic acid, trop  - Pt continues on telemetry monitoring and continuous pulse oximetry  - Will adjust VS to Q4H    At the end of the RRT pt resting in bed, O2 sats mid 90s% on 2L O2 via NC, receiving IVF bolus    Addendum: 0750: Laboratory studies resulted including lactic acid 3.4 and VBG noting acute respiratory failure.  Provided sign out to SHRUTHI Fontana, CNP, house HARRIET, who graciously notes will follow up with pt's rounding hospitalist, Dr. Stafford, to determine next steps in pt's plan of care.  Updated pt's son, Servando, regarding pt's overall clinical status, all questions answered.    Discussed with and defer further cares to nursing and hospitalist    Interval History     Benita Hubbard is a 89 year old female who was admitted on 10/6/2024 for fall.    Medical history significant for:   Past Medical History:   Diagnosis Date    Cardiomyopathy due to hypertension (H)     Cardiomyopathy, nonischemic (H)     Coronary artery disease     Diverticulitis of colon     Hypertension     Neuropathy     Osteoporosis     Palmar plantar dysesthesia     Small fiber neuropathy     UTI (lower urinary tract infection)      Code Status: Full Code    Allergies  "  Allergies   Allergen Reactions    Cephalexin Other (See Comments)     Dizzy and chest tightness    Ciprofloxacin Other (See Comments)     Neuropathy bottom of feet      Nitrofurantoin Other (See Comments)     Neuropathy bottom of feet    Ofloxacin GI Disturbance     Numbness    Sulfa Antibiotics      Coated tongue     Physical Exam   Vital Signs with Ranges:  Temp:  [94.6  F (34.8  C)-97.3  F (36.3  C)] 97.3  F (36.3  C)  Pulse:  [69-86] 83  Resp:  [4-20] 16  BP: ()/(60-90) 88/66  SpO2:  [90 %-100 %] 94 %  I/O last 3 completed shifts:  In: 1740 [P.O.:240; IV Piggyback:1500]  Out: 200 [Urine:200]    Constitutional: Pt lying in bed, eyes closed, in no overt distress  Neck: No upper airway wheezes or stridor noted, pt's oral mucosa profoundly dry  Pulmonary: In no apparent respiratory distress, clear to auscultation bilaterally, no crackles or wheezes noted  Cardiovascular: Regular rate and rhythm, normal S1S2, no murmur, rub or gallop noted  GI: Soft, nondistended, nontender to palpation, no guarding or rebound tenderness noted  Skin/Integumen: Warm, dry, pink  Neuro: Awake, alert, [place], PERRL 2 mm, able to answer questions and follow commands, moving extremities spontaneously  Psych:  Calm  Extremities: No peripheral edema noted    Data     VBG:  No results for input(s): \"PHV\", \"PO2V\", \"PCO2V\", \"HCO3V\" in the last 168 hours.    Lactic acid:  No results for input(s): \"LACT\" in the last 168 hours.    Troponin:  No results for input(s): \"TROPONIN\", \"TROPI\", \"TROPR\", \"TROPONINIS\" in the last 168 hours.    Invalid input(s): \"TROPT\", \"TROP\", \"TROPONINIES\", \"TNIH\"    CBC with Diff:  Recent Labs   Lab Test 10/06/24  1248   WBC 10.6   HGB 13.9   MCV 94   *      Comprehensive Metabolic Panel:  Recent Labs   Lab 10/06/24  2251 10/06/24  1158   * 133*   POTASSIUM 5.3 5.4*   CHLORIDE 96* 96*   CO2 19* 22   ANIONGAP 19* 15   * 122*   BUN 44.9* 41.6*   CR 0.96* 0.97*   GFRESTIMATED 55* 54*   SHORTY 9.1 9.4 "   PROTTOTAL  --  6.5   ALBUMIN  --  3.9   BILITOTAL  --  0.5   ALKPHOS  --  63   ALT  --  28     Medical Decision Making       25 MINUTES SPENT BY ME on the date of service doing chart review, history, exam, documentation & further activities per the note.       SHRUTHI Lawson [facility]  Hospitalist-House [facility]RI[facility]  Hospitalist Service  Securely message with [employer] (more info)  Text page via [facility] Paging/Directory

## 2024-10-07 NOTE — CODE/RAPID RESPONSE
[facility]    Sepsis Evaluation Progress Note    Date of Service: 10/07/2024    I was called to see Benita Hubbard due to  lactic acid . She is  newly diagnosed  to have an infection, acute cystitis    HPI 89-year-old female with PMH hypertension, nonischemic cardiomyopathy who was admitted with left femoral neck fracture after fall and prolonged downtime obtain and mild rhabdomyolysis and hyperkalemia.  Also found to be thrombocytopenic.  Course complicated by elevated troponin.  Patient seen in consultation by cardiology who preferred medical management but largely thought troponin elevation was stress related.  TTE shows LVEF-45% with dilated RV.  RRT activated for lactic acid 4    Subjective:  Patient denies any pain dyspnea, family reports she is not at her baseline mentation    Objective:  Constitutional: vs as per EMR  General: Thin/cachectic appearing elderly pt lying in bed without acute distress  Neuro: +follows commands wiggle toes and show 2 fingers bilat, face symmetric, tongue midline, speech fluent  Eyes pupils equal round 3mm briskly reactive bilat, sclera nonicteric, noninjected, conjunctiva pink,  Head, ENT & mouth: NC/AT, dry oral mucosa  Neck: supple  CV S1S2 murmurs  resp: CTAB upper lobes  gi:normoactive bowel sounds, soft, nontender, nondisteded  Ext: no edema, trace bilateral knee mottling, hands and feet all are cold  Skin: no rashes on exposed skin  Musculoskeletal no bony joint deformities      Physical Exam    Vital Signs:  Temp: 98.5  F (36.9  C) Temp src: Axillary BP: 104/76 Pulse: 79   Resp: 18 SpO2: 91 % (notify RN) O2 Device: Oxymask Oxygen Delivery: 2 LPM    Lab:  Lactic Acid   Date Value Ref Range Status   10/07/2024 3.5 (H) 0.7 - 2.0 mmol/L Final     Lactic Acid, Initial   Date Value Ref Range Status   10/07/2024 4.0 (HH) 0.7 - 2.0 mmol/L Final     Assessment and Plan    Lactic acidosis persistent  Extensive workup already pursued by james  hospitalist attending physician and she has already received a total of 2.5 L of fluid resuscitation since admission, has been started on ceftriaxone for likely acute cystitis.  Aside from cystitis no other readily identifiable source  -She is pending CT angiogram to rule out PE  -Glucose 152 mg/dL  -NICOM obtained, no longer fluid responsive, at the top of the zahra Starling curve  -Add on LFTs  -K has normalized down to 5.3 such that treatment not warranted at present  - Pulses checked in bilateral L, easily found with Doppler on right foot, left foot has audible dorsalis pedis, unable to find with Doppler of the posterior tibialis, she has a good Doppler sound of the left popliteal.  -Discussed case at length with hospitalist attending physician, will proceed with CT abdomen pelvis in addition to chest to rule out any intra-abdominal catastrophe  -CMS checks to LLE every 4 hours x 24 hours  -MAR reviewed no pending meds  - Patient has catheter in place is making urine  -Unclear if hypoxia is present or not, difficult to obtain SpO2 given her cold hands and feet, ear probe SpO2 reading mid 90s intermittently    The SIRS criteria and exam findings are likely due to possible sepsis, qSOFA score 1 for mentation, not have any SIRS criteria    Interventions:    ID: The patient is currently on the following antibiotics:  Anti-infectives (From now, onward)      Start     Dose/Rate Route Frequency Ordered Stop    10/07/24 1100  cefTRIAXone (ROCEPHIN) 1 g vial to attach to  mL bag for ADULTS or NS 50 mL bag for PEDS         1 g  over 15-30 Minutes Intravenous EVERY 24 HOURS 10/07/24 1055            Current antibiotic coverage is appropriate for source of infection.    Non-Invasive Cardiac Output Monitoring (NICOM)    Assessment episode: 1  Indication for NICOM use: Lactic acid >/= 4.0  % Change SVI: < 10%  Fluid responsive: No    Fluid: Not indicated given her resuscitation thus far 2.5 L, dilated RV and fluid  nonresponsiveness    Lab: Repeat lactic acid should not be ordered any further.  Unless it is markedly abnormal i.e. greater than 4 we know she is no longer fluid responsive so management in that regards would not change.  May just need time to metabolize the lactic acid.    Disposition: The patient will be transferring to CCU .    Total time 50 minutes [id number], 115-1:55 PM    SHRUTHI Zurita [facility]  Hospitalist Service  [facility]  Securely message with [employer] (more info)  Text page via [facility] Paging/Directory

## 2024-10-07 NOTE — PROGRESS NOTES
[facility]    Medicine Progress Note - Hospitalist Service    Date of Admission:  10/6/2024    Interval History   Patient seen this am multiple times to evaluate lactic acidosis, elevated potassium, lethargy and change.   Her sons are at the bedside and updated throughout the day. Patient herself confirmed that she is DNR/DNI  Has had 2 RRT with chest pain earlier and then worsening lactic acidosis.   Discussion at the bedside multiple times with her 2 sons.  Decision to avoid ICU, central lines but attempt to reverse and identify contributing factors causing to worsening status    Assessment & Plan   Benita Hubbard is a 89 year old female with h/o  HTN, non ischemic CMP,  was found on the floor and was brought to the ER by EMS.  She is noted to have left femoral neck fracture and admitted on 10/6/2024 for further management.      Lactic acidosis:   DDX: cardiac, PE evaluation (negative), infection (Started on rocephin), fracture/fall on floor   - Patient with elevated lactic acid of 3.4 on admit   - Given fluids this am with reduction in lactate to 2.3 initially and then rebound increase 3.5>>4>>3.5   - Remains on d5NS at 100   - Differential diagnosis considered   - Elevated troponins with cardiology consult >> no intervention probable supply demand per cardiology discussion but noted to have cardiomyopathy  -RV dilation on echo leading to CT PE exclusion  -Potential underlying UTI placed on ceftriaxone.  -Gallbladder wall thickening based on CT abdomen will get right upper quadrant ultrasound but LFTs normal  -Mild rhabdomyolysis treated with fluids  -Multiple reassessments throughout the day regarding lactic acidosis and follow up.       Hyperkalemia:  Worsening (rhabdo, ACE, acidosis)   - At baseline potassium low 5 range and on ACE  - stopped ACE   - Lucero to alleviate urinary emptying   - monitor potassium   - Peak potassium 5.7 >> given fluids with lucero placed   - potassium  stabilizing 5.1 with no interventions (lucero and fluid)     H/o nonischemic cardiomyopathy  Elevated troponin   - LVEF was 40-45% in 2014.  Coronary angiogram showed normal coronaries.  LVEF subsequently normalized  - Follow up ECHO normalized function 5/2014   - EKG with RBBB  - Troponin up [id number]  - Cardiology consult with elevated lactate and troponin   - 10/7 ECHO with EF 40-45% Flattened septum with RV pressure/volume overload. RV dilated. Moderate TR, mild ME  - with RV dilation PE excluded with CT   - 10/7 discussions with cardiology and no interventions planned. >> still most likely from stress demand    - Multiple discussions with cardiology, family, nursing.      Suspected fall, unclear etiology  Left femoral neck fracture, acute, displaced  CT abdomen/pelvis displaced and angulated left femoral neck with nondisplaced fracture of the left pubic bone and left inferior and superior pubic rami. (Review with orthopedics)   - Unclear etiology of fall.  She was found on floor.    - Patient does not remember when she fell, and also the mechanism.   -  A bruise in her chin was noted 3 days ago by son, patient did not remember falling then as well.    - Uses walker mostly for mobility.  - Multiple imaging studies including left shoulder x-ray, chest x-ray, pelvis and hip x-ray,   - CT head and CT C-spine completed, noted left femoral neck fracture with displacement.    - 10/7 orthopedic consult with delay of surgery given multiple acute medical concerns >> reassess on 10/8    Acute hypoxic respiratory failure  CT no PE, effusions,   Potential Right lung base pneumonia   Few small pulmonary nodules  - Difficult to evaluate oxygen levels with finger probe, ear probe and head probe.   - When able to get what appears to be good wave forms in mid 90%  - face mask as mouth breather  - CT with no obvious PE, small effusions Left greater than right   - on ceftriaxone for UTI vs PNA.   - ECHO with 40-45% >> holding on  lasix with contrast.         Rhabdomyolysis  CK mildly elevated at  748.  Suspect related to fall.  -IV hydration and follow  - Repeat 393     UTI  Urine culture sent   - UA positive with catheter  - CT with mild diffuse bladder wall thickening and enhancement ? Cystitis.   - started on ceftriaxone. 10/7    Diffuse Gallbladder wall thickening:   - on CT floor  - Liver profile negative.   - RUQ ultrasound pending      Thrombocytopenia  Mild thrombocytopenia, platelet count 112.  -Monitor  -Check iron panel, B12 and folate level.  - platelet 108. >> monitor     Compression fractures.   T5 and L3 indeterminate  - 7/2019 xray of lumbar spine with mild L3 compression fracture   - 9/2021 CT chest with age indeterminate T4, mild T5 and T6      Cognitive impairment  Acute encephalopathy   Son reports patient is forgetful and has short-term memory impairment.    - No formal diagnosis of dementia  - CT head no acute injury   - baseline short term memory problems now with lyrica, dilaudid and oxycodone on nights >> Stopped opioids with rectal tylenol.     GOALS of care:   Multiple discussions with sons throughout the day. Patient with instability, lactic acidosis, hyperkalemia, Lethargy, fall.   Patient was able to confirm herself she was DNR/DNI. Family in agreement with supportive treatment with fluids, PE, Cardiac evaluation but not planning to increase level of ICU, intubation, lines.   Agreement to continue with support as currently.   If patient had decline or abrupt change would update sons of any acute changes in her status.          Diet: NPO per Anesthesia Guidelines for Procedure/Surgery Except for: Meds  NPO per Anesthesia Guidelines for Procedure/Surgery Except for: Meds    DVT Prophylaxis: Pneumatic Compression Devices  Torres Catheter: PRESENT, indication:    Lines: None     Cardiac Monitoring: ACTIVE order. Indication: Tachyarrhythmias, acute (48 hours)  Code Status: No CPR- Do NOT Intubate      Clinically  Significant Risk Factors        # Hyperkalemia: Highest K = 5.7 mmol/L in last 2 days, will monitor as appropriate  # Hyponatremia: Lowest Na = 133 mmol/L in last 2 days, will monitor as appropriate     # Anion Gap Metabolic Acidosis: Highest Anion Gap = 19 mmol/L in last 2 days, will monitor and treat as appropriate    # Thrombocytopenia: Lowest platelets = 104 in last 2 days, will monitor for bleeding   # Hypertension: Noted on problem list                      Disposition Plan     Medically Ready for Discharge: Anticipated in 2-4 Days             EDUARDO POE MD  Hospitalist Service  [facility]  Securely message with [employer] (more info)  Text page via [employer] Paging/Directory   ______________________________________________________________________  Physical Exam   Vital Signs: Temp: 98.5  F (36.9  C) Temp src: Axillary BP: 104/76 Pulse: 79   Resp: 18 SpO2: 91 % (notify RN) O2 Device: Oxymask Oxygen Delivery: 2 LPM  Weight: 0 lbs 0 oz    General Appearance: Patient resting with eyes closed and open mouth breathing.   Wakes up to voice and questions with few word answers   Respiratory: Clear to auscultation bilaterally with no wheezes or rhonchi  Cardiovascular: Regular rate with no gallop or rub  GI: + BS, soft, non tender  Skin: No edema Pain with moving Left leg       Medical Decision Making       50 MINUTES SPENT BY ME on the date of service doing chart review, history, exam, documentation & further activities per the note.      Data     I have personally reviewed the following data over the past 24 hrs:    11.7 (H)  \   12.9   / 108 (L)     136 101 49.8 (H) /  152 (H)   5.3 22 1.09 (H) \     ALT: 28 AST: 43 AP: 57 TBILI: 0.6   ALB: 3.5 TOT PROTEIN: 6.0 (L) LIPASE: N/A     Trop: 177 (HH) BNP: N/A     Procal: N/A CRP: N/A Lactic Acid: 3.5 (H)         Imaging results reviewed over the past 24 hrs:   Recent Results (from the past 24 hour(s))   Echocardiogram Complete   Result Value    LVEF   40-45%    Narrative    [id number]  [id number]  [id number]  [id number]^LUI^EDUARDO^L     [facility]  Echocardiography Laboratory  [address] [place] [address]     Name: YOKO SU  MRN: [id number]  : 1931  Study Date: 10/07/2024 09:27 AM  Age: 89 yrs  Gender: Female  [facility]  Reason For Study: Acute Myocardial Infarction  Ordering Physician: EDUARDO POE  Referring Physician: Benjamin Castro  Performed By: Carmen Mason RDCS     BSA: 1.7 m2  Height: 65 in  Weight: 142 lb  HR: 123  BP: 112/81 mmHg  ______________________________________________________________________________  Procedure  Complete Portable Echo Adult. Definity (NDC #03951-207) given intravenously.  ______________________________________________________________________________  Interpretation Summary     The left ventricular cavity is small. Left ventricular systolic function is  moderately reduced. The visual ejection fraction is 40-45%.  Flattened septum is consistent with RV pressure/volume overload. There is  moderate global hypokinesia of the left ventricle.  The right ventricle is mild-moderately dilated. The right ventricular systolic  function is moderately reduced. Consider evaluation for pulmonary embolism if  clinically appropriate.  There is moderate (2+) tricuspid regurgitation.  There is mild (1+) pulmonic valvular regurgitation.  Dilation of the inferior vena cava is present with abnormal respiratory  variation in diameter.  There is no pericardial effusion.     Findings discussed with consulting cardiologist.  ______________________________________________________________________________  Left Ventricle  The left ventricular cavity is small. Left ventricular systolic function is  moderately reduced. The visual ejection fraction is 40-45%. Grade I or early  diastolic dysfunction. Flattened septum is consistent with RV pressure/volume  overload. There is moderate  global hypokinesia of the left ventricle.     Right Ventricle  The right ventricle is mildly dilated. The right ventricular systolic function  is moderately reduced.     Atria  The left atrium is mildly dilated. The right atrium is mild to moderately  dilated.     Mitral Valve  There is trace to mild mitral regurgitation.     Tricuspid Valve  There is moderate (2+) tricuspid regurgitation. The right ventricular systolic  pressure is approximated at 29.0 mmHg plus the right atrial pressure.     Aortic Valve  There is mild trileaflet aortic sclerosis. No hemodynamically significant  valvular aortic stenosis.     Pulmonic Valve  There is mild (1+) pulmonic valvular regurgitation.     Vessels  The aortic root is normal size. Dilation of the inferior vena cava is present  with abnormal respiratory variation in diameter.     Pericardium  There is no pericardial effusion.     ______________________________________________________________________________  MMode/2D Measurements & Calculations  IVSd: 1.4 cm  LVIDd: 2.9 cm  LVIDs: 2.2 cm  LVPWd: 1.2 cm  FS: 23.8 %  LV mass(C)d: 116.8 grams  LV mass(C)dI: 68.3 grams/m2     Ao root diam: 2.8 cm  LVOT diam: 1.5 cm  LVOT area: 1.9 cm2  Ao root diam index Ht(cm/m): 1.7  Ao root diam index BSA (cm/m2): 1.7  EF Biplane: 45.0 %  RWT: 0.81  TAPSE: 1.4 cm     Doppler Measurements & Calculations  MV E max clarence: 65.5 cm/sec  MV A max clarence: 59.2 cm/sec  MV E/A: 1.1  MV dec slope: 262.0 cm/sec2  MV dec time: 0.25 sec  TR max clarence: 268.5 cm/sec  TR max P.0 mmHg  E/E' av.4  Lateral E/e': 10.6  Medial E/e': 18.2  RV S Clarence: 8.3 cm/sec     ______________________________________________________________________________  Report approved by: Maxime Rucker 10/07/2024 10:28 AM         CT Chest (PE) Abdomen Pelvis w Contrast    Narrative    CT CHEST PE, ABDOMEN AND PELVIS WITH CONTRAST  10/7/2024 2:25 PM    CLINICAL HISTORY: Chest pain. Elevated lactate level. Recent left hip  fracture.  Evaluate for pulmonary embolism.    TECHNIQUE: CT angiogram chest and routine CT abdomen pelvis with IV  contrast. Arterial phase through the chest and venous phase through  the abdomen and pelvis. 2D and 3D MIP reconstructions were preformed  by the CT technologist. Dose reduction techniques were used.   CONTRAST: 63 mL [id number]-[id number]    COMPARISON: 9/18/2021.    FINDINGS:  ANGIOGRAM CHEST: No convincing evidence for pulmonary embolism.  Heterogeneous opacification within the pulmonary arteries is likely  due to admixture of opacified and nonopacified blood. There is limited  opacification of the more distal pulmonary arterial branches. No  evidence for thoracic aortic aneurysm.  The thoracic aorta is not well  opacified.     LUNGS AND PLEURA: There are small bilateral pleural effusions, larger  on the right. Patchy airspace opacity at the right lung base may be  related to atelectasis or pneumonia. No pneumothorax. A few small  pulmonary nodules in both lungs measure 0.4 cm or smaller, unchanged  and highly likely to be of benign etiology.    MEDIASTINUM/AXILLAE: No enlarged lymph nodes in the chest. No  pericardial effusion.    CORONARY ARTERY CALCIFICATION: Moderate.    HEPATOBILIARY: There is diffuse gallbladder wall enhancement. No  shadowing gallstones are seen. No hepatic masses.    PANCREAS: Normal.    SPLEEN: Normal.    ADRENAL GLANDS: Normal.    KIDNEYS/BLADDER: Torres catheter in the bladder. There is mild  thickening and diffuse enhancement of the bladder wall. No  hydronephrosis.    BOWEL: No bowel obstruction. Colonic diverticulosis. No convincing  evidence for colitis or diverticulitis. Unremarkable appendix.    PELVIC ORGANS: Hysterectomy. Small amount of nonspecific free fluid in  the pelvis.    LYMPH NODES: No enlarged lymph nodes are identified in the abdomen or  pelvis.    VASCULATURE: Mild atherosclerotic aortoiliac calcification.    ADDITIONAL FINDINGS: None.    MUSCULOSKELETAL: Displaced and  angulated acute fracture of the left  femoral neck. There are also nondisplaced fractures of the left pubic  bone and left superior and inferior pubic rami. Mild age-indeterminate  compression of the L3 vertebral body. Marked compression of the T4  vertebral body is unchanged. Marked compression of the T5 vertebral  body is new since the previous exam, age indeterminate.      Impression    IMPRESSION:  1.  No evidence for pulmonary embolism.  2.  Small bilateral pleural effusions, larger on the right.  3.  Patchy airspace opacity at the right lung base posteriorly may be  related to atelectasis or pneumonia.  4.  There is diffuse gallbladder wall enhancement. If there is  clinical suspicion for cholecystitis, gallbladder ultrasound would be  recommended.  5.  Mild diffuse bladder wall thickening and enhancement are  nonspecific, but could be related to cystitis.  6.  Age-indeterminate compressions of the T5 and L3 vertebral bodies.  7.  Displaced and angulated acute fracture of the left femoral neck,  as well as nondisplaced fractures of the left pubic bone and left  inferior and superior pubic rami.  8.  Small amount of ascites.    RICH MORRISON MD         SYSTEM ID:  [id number]

## 2024-10-07 NOTE — PLAN OF CARE
Trauma/Ortho/Medical (Choose one)  Ortho.    Diagnosis: Fall Resulted to Left Femoral neck fracture.  Mental Status: A&Ox2 forgetful.  Activity/dangle Not OOB yet on this shift.  Diet: NPP at midnight.  Pain: Managed with Dilaudid.  Torres/Voiding: External catheter.  Tele/Restraints/Iso: no.  02/LDA: PIV infusing.  D/C Date: Pending.  Other Info: Pt had a fall at home, came with open aria on left anterior lower leg. No pain at this time VSS on room air, CMS intact. NPO at midnight.

## 2024-10-07 NOTE — ANESTHESIA PREPROCEDURE EVALUATION
"Anesthesia Pre-Procedure Evaluation    Patient: Benita Hubbard   MRN: [id number] : 1931        Procedure : Procedure(s):  HIP HEMIARTHROPLASTY          Past Medical History:   Diagnosis Date    Cardiomyopathy due to hypertension (H)     Cardiomyopathy, nonischemic (H)     Coronary artery disease     Diverticulitis of colon     Hypertension     Neuropathy     Osteoporosis     Palmar plantar dysesthesia     Small fiber neuropathy     UTI (lower urinary tract infection)       Past Surgical History:   Procedure Laterality Date    CORONARY ANGIOGRAPHY ADULT ORDER  14    normal Coronary arteries    HYSTERECTOMY, PAP NO LONGER INDICATED      ovaries still in place    ORTHOPEDIC SURGERY      Left hand \"hard lumps\" excised    SEPTOPLASTY      SURGICAL HISTORY OF -       vein stripping right leg      Allergies   Allergen Reactions    Cephalexin Other (See Comments)     Dizzy and chest tightness    Ciprofloxacin Other (See Comments)     Neuropathy bottom of feet      Nitrofurantoin Other (See Comments)     Neuropathy bottom of feet    Ofloxacin GI Disturbance     Numbness    Sulfa Antibiotics      Coated tongue      Social History     Tobacco Use    Smoking status: Former     Current packs/day: 0.00     Types: Cigarettes     Quit date: 1969     Years since quittin.2    Smokeless tobacco: Never    Tobacco comments:     light smoker--1-2 cigarettes per day   Substance Use Topics    Alcohol use: Yes     Comment: rare      Wt Readings from Last 1 Encounters:   21 57.3 kg (126 lb 6.4 oz)        Anesthesia Evaluation   Pt has had prior anesthetic. Type: General.        ROS/MED HX  ENT/Pulmonary:    (-) tobacco use, asthma, COPD and sleep apnea   Neurologic:     (+)   dementia (cognitive impairment), peripheral neuropathy,                            Cardiovascular:     (+)  hypertension- -   -  - -      CHF (non-ischemic)  Last EF: 50-55                dysrhythmias, a-fib,        Previous cardiac " "testing   Echo: Date: Results:  Name: YOKO SU  MRN: [id number]  : 1931  Study Date: 2017 09:43 AM  Age: 86 yrs  Gender: Female  [place] [facility] [place]  Reason For Study: , Essential (primary) hypertension  Ordering Physician: ALEXIS LATHAM  Referring Physician: Martha  Performed By: Carolyn Turner RDCS     BSA: 1.7 m2  Height: 66 in  Weight: 145 lb  HR: 52  BP: 130/70 mmHg  _____________________________________________________________________________  __     Procedure  Complete Echo Adult.     _____________________________________________________________________________  __        Interpretation Summary     The visual ejection fraction is estimated at 50-55%.  \"L\" wave present on mitral inflow signal, this may indicate elevated LV  filling pressure. Other parameters of diastolic dysfxn are non diagnostic on  this study  Inferior wall not well seen and may be hypokinetic  There is mild to moderate (1-2+) tricuspid regurgitation.  The ascending aorta is Borderline dilated.  _____________________________________________________________________________  __        Left Ventricle  The left ventricle is normal in size. There is normal left ventricular wall  thickness. The visual ejection fraction is estimated at 50-55%. \"L\" wave  present on mitral inflow signal, this may indicate elevated LV filling  pressure. Other parameters of diastolic dysfxn are non diagnostic on this  study. Inferior wall not well seen and may be hypokinetic. There is borderline  inferior wall hypokinesis. There is no thrombus seen in the left ventricle.     Right Ventricle  The right ventricle is normal in structure, function and size.     Atria  There is mild biatrial enlargement. There is no color Doppler evidence of an  atrial shunt.     Mitral Valve  There is mild (1+) mitral regurgitation.     Tricuspid Valve  There is mild to moderate (1-2+) tricuspid regurgitation. The right  ventricular systolic pressure " is approximated at 27.5 mmHg plus the right  atrial pressure. Dilated IVC (>2.5cm) with <50% respiratory collapse; right  atrial pressure is estimated at 15-20mmHg.        Aortic Valve  The aortic valve is trileaflet.     Pulmonic Valve  The pulmonic valve is not well seen, but is grossly normal.     Vessels  The ascending aorta is Borderline dilated.     Pericardium  The pericardium appears normal.     Rhythm  Sinus rhythm was noted.     _____________________________________________________________________________  __  MMode/2D Measurements & Calculations  IVSd: 1.0 cm  LVIDd: 4.5 cm  LVIDs: 3.4 cm  LVPWd: 0.72 cm  FS: 25.1 %  EDV(Teich): 92.4 ml  ESV(Teich): 46.4 ml  LV mass(C)d: 129.2 grams  LV mass(C)dI: 74.0 grams/m2  Ao root diam: 2.7 cm  LA dimension: 3.2 cm    Stress Test:  Date: Results:    ECG Reviewed:  Date: Results:    Cath:  Date: Results:   (-) CAD   METS/Exercise Tolerance:     Hematologic:  - neg hematologic  ROS     Musculoskeletal:   (+)     fracture, Fracture location: LLE,         GI/Hepatic:    (-) GERD and liver disease   Renal/Genitourinary:     (+) renal disease, type: ARF, Pt does not require dialysis,           Endo:    (-) Type I DM and Type II DM   Psychiatric/Substance Use:       Infectious Disease:       Malignancy:       Other:               OUTSIDE LABS:  CBC:   Lab Results   Component Value Date    WBC 11.9 (H) 10/07/2024    WBC 10.6 10/06/2024    HGB 14.1 10/07/2024    HGB 13.9 10/06/2024    HCT 43.0 10/07/2024    HCT 42.5 10/06/2024     (L) 10/07/2024     (L) 10/06/2024     BMP:   Lab Results   Component Value Date     (L) 10/06/2024     (L) 10/06/2024    POTASSIUM 5.3 10/06/2024    POTASSIUM 5.4 (H) 10/06/2024    CHLORIDE 96 (L) 10/06/2024    CHLORIDE 96 (L) 10/06/2024    CO2 19 (L) 10/06/2024    CO2 22 10/06/2024    BUN 44.9 (H) 10/06/2024    BUN 41.6 (H) 10/06/2024    CR 0.96 (H) 10/06/2024    CR 0.97 (H) 10/06/2024     (H) 10/07/2024      "(H) 10/06/2024     COAGS: No results found for: \"PTT\", \"INR\", \"FIBR\"  POC: No results found for: \"BGM\", \"HCG\", \"HCGS\"  HEPATIC:   Lab Results   Component Value Date    ALBUMIN 3.9 10/06/2024    PROTTOTAL 6.5 10/06/2024    ALT 28 10/06/2024    AST  10/06/2024      Comment:      Unsatisfactory specimen - hemolyzed     ALKPHOS 63 10/06/2024    BILITOTAL 0.5 10/06/2024     OTHER:   Lab Results   Component Value Date    LACT 3.4 (H) 10/07/2024    SHORTY 9.1 10/06/2024       Anesthesia Plan    ASA Status:  3    NPO Status:  NPO Appropriate    Anesthesia Type: General.     - Airway: ETT   Induction: Intravenous.   Maintenance: Balanced.        Consents    Anesthesia Plan(s) and associated risks, benefits, and realistic alternatives discussed. Questions answered and patient/representative(s) expressed understanding.     - Discussed:     - Discussed with:  Patient            Postoperative Care    Pain management: Peripheral nerve block (Single Shot), Multi-modal analgesia.   PONV prophylaxis: Ondansetron (or other 5HT-3)     Comments:    Other Comments: Fascia iliaca block           Deny Velasco MD    I have reviewed the pertinent notes and labs in the chart from the past 30 days and (re)examined the patient.  Any updates or changes from those notes are reflected in this note.    # Hyperkalemia: Highest K = 5.4 mmol/L in last 2 days, will monitor as appropriate  # Hyponatremia: Lowest Na = 133 mmol/L in last 2 days, will monitor as appropriate     # Anion Gap Metabolic Acidosis: Highest Anion Gap = 19 mmol/L in last 2 days, will monitor and treat as appropriate    # Thrombocytopenia: Lowest platelets = 104 in last 2 days, will monitor for bleeding   # Hypertension: Noted on problem list                   "

## 2024-10-08 ENCOUNTER — APPOINTMENT (OUTPATIENT)
Dept: GENERAL RADIOLOGY | Facility: CLINIC | Age: 89
DRG: 521 | End: 2024-10-08
Payer: MEDICARE

## 2024-10-08 LAB
ANION GAP SERPL CALCULATED.3IONS-SCNC: 11 MMOL/L (ref 7–15)
ANION GAP SERPL CALCULATED.3IONS-SCNC: 8 MMOL/L (ref 7–15)
ATRIAL RATE - MUSE: 108 BPM
BASE EXCESS BLDV CALC-SCNC: -1.8 MMOL/L (ref -3–3)
BUN SERPL-MCNC: 33.2 MG/DL (ref 8–23)
BUN SERPL-MCNC: 41.6 MG/DL (ref 8–23)
CALCIUM SERPL-MCNC: 8.1 MG/DL (ref 8.8–10.4)
CALCIUM SERPL-MCNC: 8.2 MG/DL (ref 8.8–10.4)
CHLORIDE SERPL-SCNC: 108 MMOL/L (ref 98–107)
CHLORIDE SERPL-SCNC: 110 MMOL/L (ref 98–107)
CK SERPL-CCNC: 220 U/L (ref 26–192)
CREAT SERPL-MCNC: 0.86 MG/DL (ref 0.51–0.95)
CREAT SERPL-MCNC: 1.02 MG/DL (ref 0.51–0.95)
DIASTOLIC BLOOD PRESSURE - MUSE: NORMAL MMHG
EGFRCR SERPLBLD CKD-EPI 2021: 51 ML/MIN/1.73M2
EGFRCR SERPLBLD CKD-EPI 2021: 63 ML/MIN/1.73M2
ERYTHROCYTE [DISTWIDTH] IN BLOOD BY AUTOMATED COUNT: 14.4 % (ref 10–15)
ERYTHROCYTE [DISTWIDTH] IN BLOOD BY AUTOMATED COUNT: 14.4 % (ref 10–15)
GLUCOSE SERPL-MCNC: 124 MG/DL (ref 70–99)
GLUCOSE SERPL-MCNC: 142 MG/DL (ref 70–99)
HCO3 BLDV-SCNC: 25 MMOL/L (ref 21–28)
HCO3 SERPL-SCNC: 20 MMOL/L (ref 22–29)
HCO3 SERPL-SCNC: 23 MMOL/L (ref 22–29)
HCT VFR BLD AUTO: 35.4 % (ref 35–47)
HCT VFR BLD AUTO: 36.9 % (ref 35–47)
HGB BLD-MCNC: 11.2 G/DL (ref 11.7–15.7)
HGB BLD-MCNC: 11.9 G/DL (ref 11.7–15.7)
INTERPRETATION ECG - MUSE: NORMAL
LACTATE SERPL-SCNC: 1.2 MMOL/L (ref 0.7–2)
MAGNESIUM SERPL-MCNC: 2.1 MG/DL (ref 1.7–2.3)
MAGNESIUM SERPL-MCNC: 2.2 MG/DL (ref 1.7–2.3)
MCH RBC QN AUTO: 30.7 PG (ref 26.5–33)
MCH RBC QN AUTO: 30.7 PG (ref 26.5–33)
MCHC RBC AUTO-ENTMCNC: 31.6 G/DL (ref 31.5–36.5)
MCHC RBC AUTO-ENTMCNC: 32.2 G/DL (ref 31.5–36.5)
MCV RBC AUTO: 95 FL (ref 78–100)
MCV RBC AUTO: 97 FL (ref 78–100)
MRSA DNA SPEC QL NAA+PROBE: NEGATIVE
O2/TOTAL GAS SETTING VFR VENT: 36 %
OXYHGB MFR BLDV: 55 % (ref 70–75)
P AXIS - MUSE: NORMAL DEGREES
PCO2 BLDV: 52 MM HG (ref 40–50)
PH BLDV: 7.29 [PH] (ref 7.32–7.43)
PLATELET # BLD AUTO: 67 10E3/UL (ref 150–450)
PLATELET # BLD AUTO: 85 10E3/UL (ref 150–450)
PO2 BLDV: 32 MM HG (ref 25–47)
POTASSIUM SERPL-SCNC: 4.6 MMOL/L (ref 3.4–5.3)
POTASSIUM SERPL-SCNC: 4.6 MMOL/L (ref 3.4–5.3)
POTASSIUM SERPL-SCNC: 4.7 MMOL/L (ref 3.4–5.3)
PR INTERVAL - MUSE: NORMAL MS
QRS DURATION - MUSE: 98 MS
QT - MUSE: 368 MS
QTC - MUSE: 486 MS
R AXIS - MUSE: 33 DEGREES
RBC # BLD AUTO: 3.65 10E6/UL (ref 3.8–5.2)
RBC # BLD AUTO: 3.88 10E6/UL (ref 3.8–5.2)
SA TARGET DNA: NEGATIVE
SAO2 % BLDV: 56.3 % (ref 70–75)
SODIUM SERPL-SCNC: 139 MMOL/L (ref 135–145)
SODIUM SERPL-SCNC: 141 MMOL/L (ref 135–145)
SYSTOLIC BLOOD PRESSURE - MUSE: NORMAL MMHG
T AXIS - MUSE: 24 DEGREES
VENTRICULAR RATE- MUSE: 105 BPM
WBC # BLD AUTO: 7.7 10E3/UL (ref 4–11)
WBC # BLD AUTO: 8.2 10E3/UL (ref 4–11)

## 2024-10-08 PROCEDURE — 85014 HEMATOCRIT: CPT | Performed by: INTERNAL MEDICINE

## 2024-10-08 PROCEDURE — 360N000077 HC SURGERY LEVEL 4, PER MIN: Performed by: STUDENT IN AN ORGANIZED HEALTH CARE EDUCATION/TRAINING PROGRAM

## 2024-10-08 PROCEDURE — C1776 JOINT DEVICE (IMPLANTABLE): HCPCS | Performed by: STUDENT IN AN ORGANIZED HEALTH CARE EDUCATION/TRAINING PROGRAM

## 2024-10-08 PROCEDURE — 99233 SBSQ HOSP IP/OBS HIGH 50: CPT | Performed by: INTERNAL MEDICINE

## 2024-10-08 PROCEDURE — 250N000011 HC RX IP 250 OP 636: Performed by: INTERNAL MEDICINE

## 2024-10-08 PROCEDURE — 80048 BASIC METABOLIC PNL TOTAL CA: CPT | Performed by: INTERNAL MEDICINE

## 2024-10-08 PROCEDURE — 370N000017 HC ANESTHESIA TECHNICAL FEE, PER MIN: Performed by: STUDENT IN AN ORGANIZED HEALTH CARE EDUCATION/TRAINING PROGRAM

## 2024-10-08 PROCEDURE — 0SRS019 REPLACEMENT OF LEFT HIP JOINT, FEMORAL SURFACE WITH METAL SYNTHETIC SUBSTITUTE, CEMENTED, OPEN APPROACH: ICD-10-PCS | Performed by: STUDENT IN AN ORGANIZED HEALTH CARE EDUCATION/TRAINING PROGRAM

## 2024-10-08 PROCEDURE — 93005 ELECTROCARDIOGRAM TRACING: CPT

## 2024-10-08 PROCEDURE — 258N000003 HC RX IP 258 OP 636: Performed by: INTERNAL MEDICINE

## 2024-10-08 PROCEDURE — 27125 PARTIAL HIP REPLACEMENT: CPT | Performed by: NURSE ANESTHETIST, CERTIFIED REGISTERED

## 2024-10-08 PROCEDURE — 250N000009 HC RX 250: Performed by: NURSE ANESTHETIST, CERTIFIED REGISTERED

## 2024-10-08 PROCEDURE — 93010 ELECTROCARDIOGRAM REPORT: CPT | Performed by: INTERNAL MEDICINE

## 2024-10-08 PROCEDURE — C1713 ANCHOR/SCREW BN/BN,TIS/BN: HCPCS | Performed by: STUDENT IN AN ORGANIZED HEALTH CARE EDUCATION/TRAINING PROGRAM

## 2024-10-08 PROCEDURE — 36415 COLL VENOUS BLD VENIPUNCTURE: CPT | Performed by: INTERNAL MEDICINE

## 2024-10-08 PROCEDURE — 27125 PARTIAL HIP REPLACEMENT: CPT | Performed by: ANESTHESIOLOGY

## 2024-10-08 PROCEDURE — 210N000002 HC R&B HEART CARE

## 2024-10-08 PROCEDURE — 250N000011 HC RX IP 250 OP 636: Performed by: STUDENT IN AN ORGANIZED HEALTH CARE EDUCATION/TRAINING PROGRAM

## 2024-10-08 PROCEDURE — 250N000009 HC RX 250: Performed by: ANESTHESIOLOGY

## 2024-10-08 PROCEDURE — 82805 BLOOD GASES W/O2 SATURATION: CPT | Performed by: INTERNAL MEDICINE

## 2024-10-08 PROCEDURE — 710N000010 HC RECOVERY PHASE 1, LEVEL 2, PER MIN: Performed by: STUDENT IN AN ORGANIZED HEALTH CARE EDUCATION/TRAINING PROGRAM

## 2024-10-08 PROCEDURE — 99100 ANES PT EXTEME AGE<1 YR&>70: CPT | Performed by: NURSE ANESTHETIST, CERTIFIED REGISTERED

## 2024-10-08 PROCEDURE — 250N000013 HC RX MED GY IP 250 OP 250 PS 637: Performed by: HOSPITALIST

## 2024-10-08 PROCEDURE — 83735 ASSAY OF MAGNESIUM: CPT | Performed by: INTERNAL MEDICINE

## 2024-10-08 PROCEDURE — 250N000011 HC RX IP 250 OP 636: Performed by: NURSE PRACTITIONER

## 2024-10-08 PROCEDURE — 250N000011 HC RX IP 250 OP 636: Performed by: NURSE ANESTHETIST, CERTIFIED REGISTERED

## 2024-10-08 PROCEDURE — 250N000025 HC SEVOFLURANE, PER MIN: Performed by: STUDENT IN AN ORGANIZED HEALTH CARE EDUCATION/TRAINING PROGRAM

## 2024-10-08 PROCEDURE — P9045 ALBUMIN (HUMAN), 5%, 250 ML: HCPCS | Performed by: NURSE ANESTHETIST, CERTIFIED REGISTERED

## 2024-10-08 PROCEDURE — 258N000003 HC RX IP 258 OP 636

## 2024-10-08 PROCEDURE — 99222 1ST HOSP IP/OBS MODERATE 55: CPT | Performed by: SURGERY

## 2024-10-08 PROCEDURE — 272N000001 HC OR GENERAL SUPPLY STERILE: Performed by: STUDENT IN AN ORGANIZED HEALTH CARE EDUCATION/TRAINING PROGRAM

## 2024-10-08 PROCEDURE — 999N000141 HC STATISTIC PRE-PROCEDURE NURSING ASSESSMENT: Performed by: STUDENT IN AN ORGANIZED HEALTH CARE EDUCATION/TRAINING PROGRAM

## 2024-10-08 PROCEDURE — 250N000009 HC RX 250: Performed by: STUDENT IN AN ORGANIZED HEALTH CARE EDUCATION/TRAINING PROGRAM

## 2024-10-08 PROCEDURE — 258N000003 HC RX IP 258 OP 636: Performed by: NURSE ANESTHETIST, CERTIFIED REGISTERED

## 2024-10-08 PROCEDURE — 999N000065 XR PELVIS AND HIP PORTABLE LEFT 1 VIEW

## 2024-10-08 PROCEDURE — 83605 ASSAY OF LACTIC ACID: CPT | Performed by: INTERNAL MEDICINE

## 2024-10-08 PROCEDURE — 99233 SBSQ HOSP IP/OBS HIGH 50: CPT | Mod: FS | Performed by: NURSE PRACTITIONER

## 2024-10-08 DEVICE — BIPOLAR COMPONENT
Type: IMPLANTABLE DEVICE | Site: HIP | Status: FUNCTIONAL
Brand: UHR

## 2024-10-08 DEVICE — BONE CEMENT SIMPLEX FULL DOSE 6191-1-001: Type: IMPLANTABLE DEVICE | Site: HIP | Status: FUNCTIONAL

## 2024-10-08 DEVICE — UNIVERSAL DISTAL CEMENT SPACER
Type: IMPLANTABLE DEVICE | Site: HIP | Status: FUNCTIONAL
Brand: OMNIFIT

## 2024-10-08 DEVICE — 127 DEGREE CEMENTED HIP STEM
Type: IMPLANTABLE DEVICE | Site: HIP | Status: FUNCTIONAL
Brand: OMNIFIT

## 2024-10-08 DEVICE — LOW FRICTION ION TREATMENT
Type: IMPLANTABLE DEVICE | Site: HIP | Status: FUNCTIONAL
Brand: C-TAPER HEAD

## 2024-10-08 RX ORDER — HYDROMORPHONE HCL IN WATER/PF 6 MG/30 ML
0.2 PATIENT CONTROLLED ANALGESIA SYRINGE INTRAVENOUS
Status: DISCONTINUED | OUTPATIENT
Start: 2024-10-08 | End: 2024-10-15 | Stop reason: HOSPADM

## 2024-10-08 RX ORDER — TRANEXAMIC ACID 10 MG/ML
1 INJECTION, SOLUTION INTRAVENOUS ONCE
Status: COMPLETED | OUTPATIENT
Start: 2024-10-08 | End: 2024-10-08

## 2024-10-08 RX ORDER — ONDANSETRON 4 MG/1
4 TABLET, ORALLY DISINTEGRATING ORAL EVERY 30 MIN PRN
Status: DISCONTINUED | OUTPATIENT
Start: 2024-10-08 | End: 2024-10-08 | Stop reason: HOSPADM

## 2024-10-08 RX ORDER — POLYETHYLENE GLYCOL 3350 17 G/17G
17 POWDER, FOR SOLUTION ORAL DAILY
Status: DISCONTINUED | OUTPATIENT
Start: 2024-10-09 | End: 2024-10-15 | Stop reason: HOSPADM

## 2024-10-08 RX ORDER — ACETAMINOPHEN 325 MG/1
650 TABLET ORAL EVERY 4 HOURS PRN
Status: DISCONTINUED | OUTPATIENT
Start: 2024-10-08 | End: 2024-10-15 | Stop reason: HOSPADM

## 2024-10-08 RX ORDER — NALOXONE HYDROCHLORIDE 0.4 MG/ML
0.1 INJECTION, SOLUTION INTRAMUSCULAR; INTRAVENOUS; SUBCUTANEOUS
Status: DISCONTINUED | OUTPATIENT
Start: 2024-10-08 | End: 2024-10-08 | Stop reason: HOSPADM

## 2024-10-08 RX ORDER — ACETAMINOPHEN 650 MG/1
650 SUPPOSITORY RECTAL EVERY 4 HOURS PRN
Status: DISCONTINUED | OUTPATIENT
Start: 2024-10-11 | End: 2024-10-08

## 2024-10-08 RX ORDER — FENTANYL CITRATE 0.05 MG/ML
50 INJECTION, SOLUTION INTRAMUSCULAR; INTRAVENOUS EVERY 5 MIN PRN
Status: DISCONTINUED | OUTPATIENT
Start: 2024-10-08 | End: 2024-10-08 | Stop reason: HOSPADM

## 2024-10-08 RX ORDER — DIGOXIN 0.25 MG/ML
500 INJECTION INTRAMUSCULAR; INTRAVENOUS ONCE
Status: COMPLETED | OUTPATIENT
Start: 2024-10-08 | End: 2024-10-08

## 2024-10-08 RX ORDER — ONDANSETRON 2 MG/ML
INJECTION INTRAMUSCULAR; INTRAVENOUS PRN
Status: DISCONTINUED | OUTPATIENT
Start: 2024-10-08 | End: 2024-10-08

## 2024-10-08 RX ORDER — CEFAZOLIN SODIUM 1 G/3ML
1 INJECTION, POWDER, FOR SOLUTION INTRAMUSCULAR; INTRAVENOUS EVERY 8 HOURS
Status: CANCELLED | OUTPATIENT
Start: 2024-10-08 | End: 2024-10-09

## 2024-10-08 RX ORDER — AMOXICILLIN 250 MG
1 CAPSULE ORAL 2 TIMES DAILY
Status: DISCONTINUED | OUTPATIENT
Start: 2024-10-08 | End: 2024-10-10

## 2024-10-08 RX ORDER — SODIUM CHLORIDE, SODIUM LACTATE, POTASSIUM CHLORIDE, CALCIUM CHLORIDE 600; 310; 30; 20 MG/100ML; MG/100ML; MG/100ML; MG/100ML
INJECTION, SOLUTION INTRAVENOUS CONTINUOUS
Status: ACTIVE | OUTPATIENT
Start: 2024-10-08 | End: 2024-10-09

## 2024-10-08 RX ORDER — BISACODYL 10 MG
10 SUPPOSITORY, RECTAL RECTAL DAILY PRN
Status: DISCONTINUED | OUTPATIENT
Start: 2024-10-10 | End: 2024-10-15 | Stop reason: HOSPADM

## 2024-10-08 RX ORDER — OXYCODONE HYDROCHLORIDE 5 MG/1
10 TABLET ORAL EVERY 4 HOURS PRN
Status: DISCONTINUED | OUTPATIENT
Start: 2024-10-08 | End: 2024-10-15

## 2024-10-08 RX ORDER — MAGNESIUM HYDROXIDE 1200 MG/15ML
LIQUID ORAL PRN
Status: DISCONTINUED | OUTPATIENT
Start: 2024-10-08 | End: 2024-10-08 | Stop reason: HOSPADM

## 2024-10-08 RX ORDER — HYDROMORPHONE HCL IN WATER/PF 6 MG/30 ML
0.4 PATIENT CONTROLLED ANALGESIA SYRINGE INTRAVENOUS
Status: DISCONTINUED | OUTPATIENT
Start: 2024-10-08 | End: 2024-10-15 | Stop reason: HOSPADM

## 2024-10-08 RX ORDER — DEXAMETHASONE SODIUM PHOSPHATE 4 MG/ML
4 INJECTION, SOLUTION INTRA-ARTICULAR; INTRALESIONAL; INTRAMUSCULAR; INTRAVENOUS; SOFT TISSUE
Status: DISCONTINUED | OUTPATIENT
Start: 2024-10-08 | End: 2024-10-08 | Stop reason: HOSPADM

## 2024-10-08 RX ORDER — METHOCARBAMOL 500 MG/1
500 TABLET, FILM COATED ORAL EVERY 6 HOURS PRN
Status: DISCONTINUED | OUTPATIENT
Start: 2024-10-08 | End: 2024-10-10

## 2024-10-08 RX ORDER — VANCOMYCIN HYDROCHLORIDE 1 G/200ML
1000 INJECTION, SOLUTION INTRAVENOUS ONCE
Status: COMPLETED | OUTPATIENT
Start: 2024-10-08 | End: 2024-10-08

## 2024-10-08 RX ORDER — ACETAMINOPHEN 650 MG/1
650 SUPPOSITORY RECTAL EVERY 4 HOURS PRN
Status: DISCONTINUED | OUTPATIENT
Start: 2024-10-08 | End: 2024-10-15 | Stop reason: HOSPADM

## 2024-10-08 RX ORDER — VASOPRESSIN IN 0.9 % NACL 2 UNIT/2ML
SYRINGE (ML) INTRAVENOUS PRN
Status: DISCONTINUED | OUTPATIENT
Start: 2024-10-08 | End: 2024-10-08

## 2024-10-08 RX ORDER — FENTANYL CITRATE 0.05 MG/ML
25 INJECTION, SOLUTION INTRAMUSCULAR; INTRAVENOUS EVERY 5 MIN PRN
Status: DISCONTINUED | OUTPATIENT
Start: 2024-10-08 | End: 2024-10-08 | Stop reason: HOSPADM

## 2024-10-08 RX ORDER — CEFAZOLIN SODIUM/WATER 2 G/20 ML
2 SYRINGE (ML) INTRAVENOUS SEE ADMIN INSTRUCTIONS
Status: DISCONTINUED | OUTPATIENT
Start: 2024-10-08 | End: 2024-10-08 | Stop reason: HOSPADM

## 2024-10-08 RX ORDER — PROPOFOL 10 MG/ML
INJECTION, EMULSION INTRAVENOUS PRN
Status: DISCONTINUED | OUTPATIENT
Start: 2024-10-08 | End: 2024-10-08

## 2024-10-08 RX ORDER — ONDANSETRON 4 MG/1
4 TABLET, ORALLY DISINTEGRATING ORAL EVERY 6 HOURS PRN
Status: DISCONTINUED | OUTPATIENT
Start: 2024-10-08 | End: 2024-10-15 | Stop reason: HOSPADM

## 2024-10-08 RX ORDER — VANCOMYCIN HYDROCHLORIDE 1 G/20ML
INJECTION, POWDER, LYOPHILIZED, FOR SOLUTION INTRAVENOUS PRN
Status: DISCONTINUED | OUTPATIENT
Start: 2024-10-08 | End: 2024-10-08 | Stop reason: HOSPADM

## 2024-10-08 RX ORDER — DEXAMETHASONE SODIUM PHOSPHATE 4 MG/ML
INJECTION, SOLUTION INTRA-ARTICULAR; INTRALESIONAL; INTRAMUSCULAR; INTRAVENOUS; SOFT TISSUE PRN
Status: DISCONTINUED | OUTPATIENT
Start: 2024-10-08 | End: 2024-10-08

## 2024-10-08 RX ORDER — LIDOCAINE 40 MG/G
CREAM TOPICAL
Status: DISCONTINUED | OUTPATIENT
Start: 2024-10-08 | End: 2024-10-15 | Stop reason: HOSPADM

## 2024-10-08 RX ORDER — ACETAMINOPHEN 325 MG/1
975 TABLET ORAL EVERY 8 HOURS
Status: COMPLETED | OUTPATIENT
Start: 2024-10-08 | End: 2024-10-11

## 2024-10-08 RX ORDER — PROCHLORPERAZINE MALEATE 5 MG/1
5 TABLET ORAL EVERY 6 HOURS PRN
Status: DISCONTINUED | OUTPATIENT
Start: 2024-10-08 | End: 2024-10-15 | Stop reason: HOSPADM

## 2024-10-08 RX ORDER — SODIUM CHLORIDE, SODIUM LACTATE, POTASSIUM CHLORIDE, CALCIUM CHLORIDE 600; 310; 30; 20 MG/100ML; MG/100ML; MG/100ML; MG/100ML
INJECTION, SOLUTION INTRAVENOUS CONTINUOUS PRN
Status: DISCONTINUED | OUTPATIENT
Start: 2024-10-08 | End: 2024-10-08

## 2024-10-08 RX ORDER — HYDROMORPHONE HCL IN WATER/PF 6 MG/30 ML
0.4 PATIENT CONTROLLED ANALGESIA SYRINGE INTRAVENOUS EVERY 5 MIN PRN
Status: DISCONTINUED | OUTPATIENT
Start: 2024-10-08 | End: 2024-10-08 | Stop reason: HOSPADM

## 2024-10-08 RX ORDER — HYDROMORPHONE HCL IN WATER/PF 6 MG/30 ML
0.2 PATIENT CONTROLLED ANALGESIA SYRINGE INTRAVENOUS EVERY 5 MIN PRN
Status: DISCONTINUED | OUTPATIENT
Start: 2024-10-08 | End: 2024-10-08 | Stop reason: HOSPADM

## 2024-10-08 RX ORDER — FENTANYL CITRATE 50 UG/ML
INJECTION, SOLUTION INTRAMUSCULAR; INTRAVENOUS PRN
Status: DISCONTINUED | OUTPATIENT
Start: 2024-10-08 | End: 2024-10-08

## 2024-10-08 RX ORDER — ONDANSETRON 2 MG/ML
4 INJECTION INTRAMUSCULAR; INTRAVENOUS EVERY 30 MIN PRN
Status: DISCONTINUED | OUTPATIENT
Start: 2024-10-08 | End: 2024-10-08 | Stop reason: HOSPADM

## 2024-10-08 RX ORDER — ENOXAPARIN SODIUM 100 MG/ML
30 INJECTION SUBCUTANEOUS EVERY 24 HOURS
Status: DISCONTINUED | OUTPATIENT
Start: 2024-10-09 | End: 2024-10-09

## 2024-10-08 RX ORDER — LIDOCAINE HYDROCHLORIDE 20 MG/ML
INJECTION, SOLUTION INFILTRATION; PERINEURAL PRN
Status: DISCONTINUED | OUTPATIENT
Start: 2024-10-08 | End: 2024-10-08

## 2024-10-08 RX ORDER — ACETAMINOPHEN 325 MG/1
650 TABLET ORAL EVERY 4 HOURS PRN
Status: DISCONTINUED | OUTPATIENT
Start: 2024-10-11 | End: 2024-10-08

## 2024-10-08 RX ORDER — TRANEXAMIC ACID 10 MG/ML
1 INJECTION, SOLUTION INTRAVENOUS ONCE
Status: DISCONTINUED | OUTPATIENT
Start: 2024-10-08 | End: 2024-10-08 | Stop reason: HOSPADM

## 2024-10-08 RX ORDER — HYDROXYZINE HYDROCHLORIDE 10 MG/1
10 TABLET, FILM COATED ORAL EVERY 6 HOURS PRN
Status: DISCONTINUED | OUTPATIENT
Start: 2024-10-08 | End: 2024-10-15 | Stop reason: HOSPADM

## 2024-10-08 RX ORDER — CLINDAMYCIN PHOSPHATE 600 MG/50ML
600 INJECTION, SOLUTION INTRAVENOUS EVERY 6 HOURS
Status: COMPLETED | OUTPATIENT
Start: 2024-10-09 | End: 2024-10-09

## 2024-10-08 RX ORDER — ONDANSETRON 2 MG/ML
4 INJECTION INTRAMUSCULAR; INTRAVENOUS EVERY 6 HOURS PRN
Status: DISCONTINUED | OUTPATIENT
Start: 2024-10-08 | End: 2024-10-15 | Stop reason: HOSPADM

## 2024-10-08 RX ORDER — OXYCODONE HYDROCHLORIDE 5 MG/1
5 TABLET ORAL EVERY 4 HOURS PRN
Status: DISCONTINUED | OUTPATIENT
Start: 2024-10-08 | End: 2024-10-15

## 2024-10-08 RX ORDER — CEFAZOLIN SODIUM/WATER 2 G/20 ML
2 SYRINGE (ML) INTRAVENOUS
Status: DISCONTINUED | OUTPATIENT
Start: 2024-10-08 | End: 2024-10-08 | Stop reason: HOSPADM

## 2024-10-08 RX ADMIN — PHENYLEPHRINE HYDROCHLORIDE 100 MCG: 10 INJECTION INTRAVENOUS at 18:47

## 2024-10-08 RX ADMIN — ALBUMIN (HUMAN): 12.5 SOLUTION INTRAVENOUS at 20:01

## 2024-10-08 RX ADMIN — PHENYLEPHRINE HYDROCHLORIDE 100 MCG: 10 INJECTION INTRAVENOUS at 20:23

## 2024-10-08 RX ADMIN — Medication 1 UNITS: at 19:37

## 2024-10-08 RX ADMIN — Medication 1 UNITS: at 20:01

## 2024-10-08 RX ADMIN — ACETAMINOPHEN 650 MG: 650 SUPPOSITORY RECTAL at 11:37

## 2024-10-08 RX ADMIN — TRANEXAMIC ACID 1 G: 10 INJECTION, SOLUTION INTRAVENOUS at 19:05

## 2024-10-08 RX ADMIN — PHENYLEPHRINE HYDROCHLORIDE 0.7 MCG/KG/MIN: 10 INJECTION INTRAVENOUS at 18:56

## 2024-10-08 RX ADMIN — PHENYLEPHRINE HYDROCHLORIDE 200 MCG: 10 INJECTION INTRAVENOUS at 18:52

## 2024-10-08 RX ADMIN — ALBUMIN (HUMAN): 12.5 SOLUTION INTRAVENOUS at 18:48

## 2024-10-08 RX ADMIN — DIGOXIN 500 MCG: 0.25 INJECTION INTRAMUSCULAR; INTRAVENOUS at 12:20

## 2024-10-08 RX ADMIN — PHENYLEPHRINE HYDROCHLORIDE 100 MCG: 10 INJECTION INTRAVENOUS at 18:44

## 2024-10-08 RX ADMIN — DEXTROSE AND SODIUM CHLORIDE: 5; 900 INJECTION, SOLUTION INTRAVENOUS at 00:04

## 2024-10-08 RX ADMIN — ACETAMINOPHEN 650 MG: 650 SUPPOSITORY RECTAL at 07:46

## 2024-10-08 RX ADMIN — PHENYLEPHRINE HYDROCHLORIDE 50 MCG: 10 INJECTION INTRAVENOUS at 18:55

## 2024-10-08 RX ADMIN — ONDANSETRON 4 MG: 2 INJECTION INTRAMUSCULAR; INTRAVENOUS at 20:38

## 2024-10-08 RX ADMIN — PIPERACILLIN AND TAZOBACTAM 2.25 G: 2; .25 INJECTION, POWDER, FOR SOLUTION INTRAVENOUS at 03:00

## 2024-10-08 RX ADMIN — SODIUM CHLORIDE, POTASSIUM CHLORIDE, SODIUM LACTATE AND CALCIUM CHLORIDE: 600; 310; 30; 20 INJECTION, SOLUTION INTRAVENOUS at 23:04

## 2024-10-08 RX ADMIN — Medication 1 UNITS: at 18:56

## 2024-10-08 RX ADMIN — DEXAMETHASONE SODIUM PHOSPHATE 4 MG: 4 INJECTION, SOLUTION INTRA-ARTICULAR; INTRALESIONAL; INTRAMUSCULAR; INTRAVENOUS; SOFT TISSUE at 19:15

## 2024-10-08 RX ADMIN — VANCOMYCIN HYDROCHLORIDE 1000 MG: 1 INJECTION, SOLUTION INTRAVENOUS at 19:30

## 2024-10-08 RX ADMIN — PHENYLEPHRINE HYDROCHLORIDE 100 MCG: 10 INJECTION INTRAVENOUS at 18:51

## 2024-10-08 RX ADMIN — PHENYLEPHRINE HYDROCHLORIDE 100 MCG: 10 INJECTION INTRAVENOUS at 20:17

## 2024-10-08 RX ADMIN — Medication 30 ML: at 18:05

## 2024-10-08 RX ADMIN — PHENYLEPHRINE HYDROCHLORIDE 100 MCG: 10 INJECTION INTRAVENOUS at 20:26

## 2024-10-08 RX ADMIN — PROPOFOL 100 MG: 10 INJECTION, EMULSION INTRAVENOUS at 18:44

## 2024-10-08 RX ADMIN — PIPERACILLIN AND TAZOBACTAM 2.25 G: 2; .25 INJECTION, POWDER, FOR SOLUTION INTRAVENOUS at 15:04

## 2024-10-08 RX ADMIN — ACETAMINOPHEN 650 MG: 650 SUPPOSITORY RECTAL at 15:30

## 2024-10-08 RX ADMIN — Medication 200 MG: at 20:38

## 2024-10-08 RX ADMIN — ROCURONIUM BROMIDE 50 MG: 50 INJECTION, SOLUTION INTRAVENOUS at 18:44

## 2024-10-08 RX ADMIN — NOREPINEPHRINE BITARTRATE 3.2 MCG: 1 INJECTION, SOLUTION, CONCENTRATE INTRAVENOUS at 18:45

## 2024-10-08 RX ADMIN — DEXTROSE AND SODIUM CHLORIDE: 5; 900 INJECTION, SOLUTION INTRAVENOUS at 09:00

## 2024-10-08 RX ADMIN — PIPERACILLIN AND TAZOBACTAM 2.25 G: 2; .25 INJECTION, POWDER, FOR SOLUTION INTRAVENOUS at 19:09

## 2024-10-08 RX ADMIN — SODIUM CHLORIDE, POTASSIUM CHLORIDE, SODIUM LACTATE AND CALCIUM CHLORIDE: 600; 310; 30; 20 INJECTION, SOLUTION INTRAVENOUS at 18:35

## 2024-10-08 RX ADMIN — PHENYLEPHRINE HYDROCHLORIDE 150 MCG: 10 INJECTION INTRAVENOUS at 18:45

## 2024-10-08 RX ADMIN — FENTANYL CITRATE 50 MCG: 50 INJECTION INTRAMUSCULAR; INTRAVENOUS at 18:44

## 2024-10-08 RX ADMIN — PIPERACILLIN AND TAZOBACTAM 2.25 G: 2; .25 INJECTION, POWDER, FOR SOLUTION INTRAVENOUS at 08:32

## 2024-10-08 RX ADMIN — LIDOCAINE HYDROCHLORIDE 100 MG: 20 INJECTION, SOLUTION INFILTRATION; PERINEURAL at 18:44

## 2024-10-08 ASSESSMENT — ACTIVITIES OF DAILY LIVING (ADL)
ADLS_ACUITY_SCORE: 46
ADLS_ACUITY_SCORE: 50
ADLS_ACUITY_SCORE: 46
ADLS_ACUITY_SCORE: 50
ADLS_ACUITY_SCORE: 46
ADLS_ACUITY_SCORE: 50
ADLS_ACUITY_SCORE: 46
ADLS_ACUITY_SCORE: 50
ADLS_ACUITY_SCORE: 46

## 2024-10-08 ASSESSMENT — ENCOUNTER SYMPTOMS: DYSRHYTHMIAS: 1

## 2024-10-08 NOTE — PROGRESS NOTES
Hospitalist service cross-cover note:     Paged regarding change in rhythm to atrial fibrillation with RVR, as high as 140s, currently improved to low 100s. Asymptomatic. Potassium has been high, will add on magnesium and replace if <2. Continue cardiac monitoring.     Eric Strickland MD   Hospitalist

## 2024-10-08 NOTE — PROGRESS NOTES
Notified provider about indwelling lucero catheter discussed removal or continued need.    Did provider choose to remove indwelling lucero catheter? No    Provider's luecro indication for keeping indwelling lucero catheter: Other - Hip surgery .    Is there an order for indwelling lucero catheter? Yes    *If there is a plan to keep lucero catheter in place at discharge daily notification with provider is not necessary, but please add a notation in the treatment team sticky note that the patient will be discharging with the catheter.

## 2024-10-08 NOTE — PLAN OF CARE
Pt aox disoriented to time and situation, 4L oxymask, bedrest and DNR/DNI. Tele Afib RVR. Torres in place. Rectal tylenol given for fractured L hip. IV D5 100ml/hr. IV Zosyn added for LA of 3.8. Q2 turns.   At 430 EKG completed for rhythm change to Afib RVR. High of 140s but sustaining in the low 100s. Overnight provider aware. Mag protocol added.   Plan: NPO at MN for possible L hip surgery.

## 2024-10-08 NOTE — PROGRESS NOTES
"[facility]    Medicine Progress Note - Hospitalist Service    Date of Admission:  10/6/2024    Interval History   Patient is more awake this am. Able to report she is in the hospital at [facility]. Know the year is \"20 something\" Denies pain outside of her hip pain.   She is more alert. Labs improved. Planning for potential surgery today.   Lactate normalized.   Discussion with orthopedics as well and updated regarding the pelvic fractures.    Assessment & Plan   Benita Hubbard is a 89 year old female with h/o  HTN, non ischemic Cardiomyopathy, CAD, neuropathy, history of UTI  was found on the floor in her independent apartment for an unknown period of time (found on welfare check) and was brought to the ER by EMS.  She was found to have left femoral neck fracture and admitted on 10/6/2024 for further management.      Following admission of 10/6 she had 2 subsequent RRT's.  First RRT early a.m on 10/7 for chest pain and shortness of breath.  Extensive evaluation as outlined below.  Noted to have refractory lactic acidosis throughout the day on 10/7.  Elevated troponin with cardiomyopathy seen by cardiology.  Hyperkalemia which is subsequently improved.  Treated for potential of UTI with antibiotics started.  Given significant amount of fluids.  Labs improved with resolution of lactic acidosis on 10/8 labs.     Lactic acidosis: (resolved)   Multifactorial potential contributing factors   DDX: cardiac (elevated trop and reduced EF) , PE evaluation (negative), infection (Started on rocephin/zosyn), fracture/fall and on floor for unknown time span with potential few days  - Patient with elevated lactic acid of 3.4 on admit 10/6   - Given fluids with reduction in lactate to 2.3 on 10/7 initially and then rebound increase 3.5>>4>>3.5   - D5NS at 100 since admit   - Differential diagnosis considered   - Elevated troponins with cardiology consult >> no intervention probable supply demand per " cardiology discussion but noted to have cardiomyopathy  -RV dilation on echo leading to CT PE exclusion (neg PE study)   -Potential underlying UTI placed on ceftriaxone.= then zosyn  -Gallbladder wall thickening based on CT abdomen with right upper quadrant ultrasound negative and normal LFT  -Mild rhabdomyolysis treated with fluids  - Multiple reassessments throughout the day regarding lactic acidosis and follow up.   - 10/8 Lactate normalized now 1.2 >> Exact etiology not confirmed but treated for multiple contributing factors and better.   - LACTATE now normal on 10/8 >> anticipate surgery     Hyperkalemia: (resolved)   Worsening on admit (rhabdo, ACE, acidosis)   - At baseline potassium low 5 range and on ACE  - stopped ACE   - Lucero to alleviate urinary emptying   - monitor potassium   - Peak potassium 5.7 >> given fluids with lucero placed   - 10/8 potassium normal 4.6     H/o nonischemic cardiomyopathy  Elevated troponin (type 2 MI secondary to supply/demand) tachycardia, acidosis,   Cardiology consult   10/8 new onset of afib   - LVEF was 40-45% in 2014.  Coronary angiogram showed normal coronaries.  LVEF subsequently normalized on ECHO 5/2014   - EKG with RBBB  - Troponin up [phone]  - Cardiology consult with elevated lactate and troponin   - 10/7 ECHO with EF 40-45% Flattened septum with RV pressure/volume overload. RV dilated. Moderate TR, mild MD  - with RV dilation PE excluded with CT   - Discussions with cardiology and no interventions planned. >> still most likely from stress demand    - 10/8 no further cardiac interventions planned monitor.   - 10/8 afib early this am. (Rate controlled) no interventions planned. With rate control. Readdress anticoagulation following surgery      Suspected fall, unclear etiology of fall  Left femoral neck fracture, acute, displaced  CT abdomen/pelvis displaced and angulated left femoral neck with nondisplaced fracture of the left pubic bone and left inferior and  superior pubic rami.   - Unclear etiology of fall.  She was found on floor.    - Patient does not remember when she fell, and also the mechanism.   -  A bruise in her chin was noted 3 days ago by son, patient did not remember falling then as well.    - Uses walker mostly for mobility.  - Multiple imaging studies including left shoulder x-ray, chest x-ray, pelvis and hip x-ray,   - CT head and CT C-spine completed, noted left femoral neck fracture with displacement.    - 10/7 ortho consult   - 10/8 ortho surgery planned. Aware of pelvic fractures   - 10/8 trauma evaluation with multiple findings, fall, lactate       Acute hypoxic respiratory failure  CT no PE, effusions,   Potential Right lung base pneumonia   Few small pulmonary nodules  - Difficult to evaluate oxygen levels with finger probe, ear probe and head probe.   - When able to get what appears to be good wave forms in mid 90%  - face mask as mouth breather  - CT with no obvious PE, small effusions Left greater than right   - on ceftriaxone for UTI vs PNA initially now on zosyn,   - ECHO with 40-45% with holding lasix until after surgery with creatinine         Rhabdomyolysis  CK mildly elevated at  748.  Suspect related to fall.  -IV hydration and follow  - Repeat 393   -      UTI  Urine culture sent   - UA positive with catheter  - CT with mild diffuse bladder wall thickening and enhancement ? Cystitis.   - started on ceftriaxone. 10/7 >> prior Urine with aerococcus so changed to zosyn.   - 10/7 changed to zosyn >> await cultures and taper as able.   - 10/8 blood cultures pending as well     Diffuse Gallbladder wall thickening:   - on CT imaging abdomen   - Liver profile negative.   - 10/7 RUQ ultrasound with no cholecystitis      Thrombocytopenia  Mild thrombocytopenia, platelet count 112.  -Monitor  -Check iron panel, B12 and folate level.  - platelet 85 will monitor     Compression fractures.   T5 and L3 indeterminate on CT chest this admit (prior  imaging suggest prior on imaging)   - 7/2019 xray of lumbar spine with mild L3 compression fracture   - 9/2021 CT chest with age indeterminate T4, mild T5 and T6   - 10/8 attempted to review with radiology but the films are not available here. (Can readdress post surgery>> Patient denies any back pain and imaging appears to suggest noted in past)       Cognitive impairment  Acute encephalopathy   Son reports patient is forgetful and has short-term memory impairment.    - No formal diagnosis of dementia  - CT head no acute injury   - baseline short term memory problems now with lyrica, dilaudid and oxycodone on nights >> Stopped opioids with rectal tylenol.   - Would not resume lyrica after surgery (this was stopped in clinic a few weeks ago)     GOALS of care:   Multiple discussions with sons throughout the day. Patient with instability, lactic acidosis, hyperkalemia, Lethargy, fall.   Patient was able to confirm herself she was DNR/DNI. Family in agreement with supportive treatment with fluids, PE, Cardiac evaluation but not planning to increase level of ICU, intubation, lines.   10/8 updated family with labs and plans for surgery          Diet: NPO per Anesthesia Guidelines for Procedure/Surgery Except for: Meds    DVT Prophylaxis: Pneumatic Compression Devices  Torres Catheter: PRESENT, indication: Surgical procedure  Lines: None     Cardiac Monitoring: ACTIVE order. Indication: Tachyarrhythmias, acute (48 hours)  Code Status: No CPR- Do NOT Intubate      Clinically Significant Risk Factors        # Hyperkalemia: Highest K = 5.7 mmol/L in last 2 days, will monitor as appropriate  # Hyponatremia: Lowest Na = 133 mmol/L in last 2 days, will monitor as appropriate  # Hypocalcemia: Lowest Ca = 8.1 mg/dL in last 2 days, will monitor and replace as appropriate    # Anion Gap Metabolic Acidosis: Highest Anion Gap = 19 mmol/L in last 2 days, will monitor and treat as appropriate    # Thrombocytopenia: Lowest platelets =  85 in last 2 days, will monitor for bleeding   # Hypertension: Noted on problem list                      Disposition Plan     Medically Ready for Discharge: Anticipated in 2-4 Days             EDUARDO POE MD  Hospitalist Service  [facility]  Securely message with [employer] (more info)  Text page via [employer] Paging/Directory   ______________________________________________________________________  Physical Exam   Vital Signs: Temp: 97.3  F (36.3  C) Temp src: Oral BP: 100/67 Pulse: 108   Resp: 16 SpO2: 100 % O2 Device: Oxymask Oxygen Delivery: 4 LPM  Weight: 113 lbs 12.12 oz    General Appearance: Patient is more alert with oxygen mask in place   Wakes up to voice and questions with few word answers   Respiratory: Clear to auscultation bilaterally with no wheezes or rhonchi  Cardiovascular: Regular rate with no gallop or rub  GI: + BS, soft, non tender  Skin: No edema with warm extremities.     Medical Decision Making       50 MINUTES SPENT BY ME on the date of service doing chart review, history, exam, documentation & further activities per the note.      Data     I have personally reviewed the following data over the past 24 hrs:    7.7  \   11.9   / 85 (L)     139 108 (H) 41.6 (H) /  142 (H)   4.6 23 1.02 (H) \     ALT: 28 AST: 43 AP: 57 TBILI: 0.6   ALB: 3.5 TOT PROTEIN: 6.0 (L) LIPASE: N/A     Trop: 177 (HH) BNP: N/A     Procal: N/A CRP: N/A Lactic Acid: 1.2         Imaging results reviewed over the past 24 hrs:   Recent Results (from the past 24 hour(s))   Echocardiogram Complete   Result Value    LVEF  40-45%    Narrative    [id number]  [id number]  [id number]  [id number]^LUI^EDUARDO^L     [facility]  Echocardiography Laboratory  [address] [place], [place] [address]     Name: YOOK SU  MRN: [id number]  : 1931  Study Date: 10/07/2024 09:27 AM  Age: 89 yrs  Gender: Female  Patient Location: [facility]  Reason For Study: Acute Myocardial Infarction  Ordering  Physician: EDUARDO POE  Referring Physician: Benjamin Castro  Performed By: Carmen Mason RDCS     BSA: 1.7 m2  Height: 65 in  Weight: 142 lb  HR: 123  BP: 112/81 mmHg  ______________________________________________________________________________  Procedure  Complete Portable Echo Adult. Definity (NDC #34566-162) given intravenously.  ______________________________________________________________________________  Interpretation Summary     The left ventricular cavity is small. Left ventricular systolic function is  moderately reduced. The visual ejection fraction is 40-45%.  Flattened septum is consistent with RV pressure/volume overload. There is  moderate global hypokinesia of the left ventricle.  The right ventricle is mild-moderately dilated. The right ventricular systolic  function is moderately reduced. Consider evaluation for pulmonary embolism if  clinically appropriate.  There is moderate (2+) tricuspid regurgitation.  There is mild (1+) pulmonic valvular regurgitation.  Dilation of the inferior vena cava is present with abnormal respiratory  variation in diameter.  There is no pericardial effusion.     Findings discussed with consulting cardiologist.  ______________________________________________________________________________  Left Ventricle  The left ventricular cavity is small. Left ventricular systolic function is  moderately reduced. The visual ejection fraction is 40-45%. Grade I or early  diastolic dysfunction. Flattened septum is consistent with RV pressure/volume  overload. There is moderate global hypokinesia of the left ventricle.     Right Ventricle  The right ventricle is mildly dilated. The right ventricular systolic function  is moderately reduced.     Atria  The left atrium is mildly dilated. The right atrium is mild to moderately  dilated.     Mitral Valve  There is trace to mild mitral regurgitation.     Tricuspid Valve  There is moderate (2+) tricuspid  regurgitation. The right ventricular systolic  pressure is approximated at 29.0 mmHg plus the right atrial pressure.     Aortic Valve  There is mild trileaflet aortic sclerosis. No hemodynamically significant  valvular aortic stenosis.     Pulmonic Valve  There is mild (1+) pulmonic valvular regurgitation.     Vessels  The aortic root is normal size. Dilation of the inferior vena cava is present  with abnormal respiratory variation in diameter.     Pericardium  There is no pericardial effusion.     ______________________________________________________________________________  MMode/2D Measurements & Calculations  IVSd: 1.4 cm  LVIDd: 2.9 cm  LVIDs: 2.2 cm  LVPWd: 1.2 cm  FS: 23.8 %  LV mass(C)d: 116.8 grams  LV mass(C)dI: 68.3 grams/m2     Ao root diam: 2.8 cm  LVOT diam: 1.5 cm  LVOT area: 1.9 cm2  Ao root diam index Ht(cm/m): 1.7  Ao root diam index BSA (cm/m2): 1.7  EF Biplane: 45.0 %  RWT: 0.81  TAPSE: 1.4 cm     Doppler Measurements & Calculations  MV E max clarence: 65.5 cm/sec  MV A max clarence: 59.2 cm/sec  MV E/A: 1.1  MV dec slope: 262.0 cm/sec2  MV dec time: 0.25 sec  TR max clarence: 268.5 cm/sec  TR max P.0 mmHg  E/E' av.4  Lateral E/e': 10.6  Medial E/e': 18.2  RV S Clarence: 8.3 cm/sec     ______________________________________________________________________________  Report approved by: Maxime Rucker 10/07/2024 10:28 AM         CT Chest (PE) Abdomen Pelvis w Contrast    Narrative    CT CHEST PE, ABDOMEN AND PELVIS WITH CONTRAST  10/7/2024 2:25 PM    CLINICAL HISTORY: Chest pain. Elevated lactate level. Recent left hip  fracture. Evaluate for pulmonary embolism.    TECHNIQUE: CT angiogram chest and routine CT abdomen pelvis with IV  contrast. Arterial phase through the chest and venous phase through  the abdomen and pelvis. 2D and 3D MIP reconstructions were preformed  by the CT technologist. Dose reduction techniques were used.   CONTRAST: 63 mL Isovue-[id number]    COMPARISON: 2021.    FINDINGS:  ANGIOGRAM  CHEST: No convincing evidence for pulmonary embolism.  Heterogeneous opacification within the pulmonary arteries is likely  due to admixture of opacified and nonopacified blood. There is limited  opacification of the more distal pulmonary arterial branches. No  evidence for thoracic aortic aneurysm.  The thoracic aorta is not well  opacified.     LUNGS AND PLEURA: There are small bilateral pleural effusions, larger  on the right. Patchy airspace opacity at the right lung base may be  related to atelectasis or pneumonia. No pneumothorax. A few small  pulmonary nodules in both lungs measure 0.4 cm or smaller, unchanged  and highly likely to be of benign etiology.    MEDIASTINUM/AXILLAE: No enlarged lymph nodes in the chest. No  pericardial effusion.    CORONARY ARTERY CALCIFICATION: Moderate.    HEPATOBILIARY: There is diffuse gallbladder wall enhancement. No  shadowing gallstones are seen. No hepatic masses.    PANCREAS: Normal.    SPLEEN: Normal.    ADRENAL GLANDS: Normal.    KIDNEYS/BLADDER: Torres catheter in the bladder. There is mild  thickening and diffuse enhancement of the bladder wall. No  hydronephrosis.    BOWEL: No bowel obstruction. Colonic diverticulosis. No convincing  evidence for colitis or diverticulitis. Unremarkable appendix.    PELVIC ORGANS: Hysterectomy. Small amount of nonspecific free fluid in  the pelvis.    LYMPH NODES: No enlarged lymph nodes are identified in the abdomen or  pelvis.    VASCULATURE: Mild atherosclerotic aortoiliac calcification.    ADDITIONAL FINDINGS: None.    MUSCULOSKELETAL: Displaced and angulated acute fracture of the left  femoral neck. There are also nondisplaced fractures of the left pubic  bone and left superior and inferior pubic rami. Mild age-indeterminate  compression of the L3 vertebral body. Marked compression of the T4  vertebral body is unchanged. Marked compression of the T5 vertebral  body is new since the previous exam, age indeterminate.      Impression     IMPRESSION:  1.  No evidence for pulmonary embolism.  2.  Small bilateral pleural effusions, larger on the right.  3.  Patchy airspace opacity at the right lung base posteriorly may be  related to atelectasis or pneumonia.  4.  There is diffuse gallbladder wall enhancement. If there is  clinical suspicion for cholecystitis, gallbladder ultrasound would be  recommended.  5.  Mild diffuse bladder wall thickening and enhancement are  nonspecific, but could be related to cystitis.  6.  Age-indeterminate compressions of the T5 and L3 vertebral bodies.  7.  Displaced and angulated acute fracture of the left femoral neck,  as well as nondisplaced fractures of the left pubic bone and left  inferior and superior pubic rami.  8.  Small amount of ascites.    RICH MORRISON MD         SYSTEM ID:  [id number]   US Abdomen Limited    Narrative    EXAM: US ABDOMEN LIMITED  LOCATION: [facility]  DATE: 10/7/2024    INDICATION: Gallbladder wall enhancement on CT.  COMPARISON: 10/7/2024 1412 hours  TECHNIQUE: Limited abdominal ultrasound.    FINDINGS:  GALLBLADDER: Normal. No gallstones, wall thickening, or pericholecystic fluid. Negative sonographic Ortega's sign.    BILE DUCTS: No biliary dilatation. The common duct measures 5 mm.    LIVER: Normal parenchyma with smooth contour. No focal mass.    RIGHT KIDNEY: No hydronephrosis.    PANCREAS: The visualized portions are normal.    Trace ascites noted. There is a small right pleural effusion.      Impression    IMPRESSION:  1.  No ultrasound evidence of acute cholecystitis.  2.  Trace ascites.  3.  Small right pleural effusion.

## 2024-10-08 NOTE — PLAN OF CARE
Goal Outcome Evaluation:      Plan of Care Reviewed With: patient, child        Alert to self, more alert than day prior. Frequent reorientation needed. Bedrest. Grimacing in pain with minimal movement, prn tylenol suppository, ice applied, effective per pt report. O2 weaned to RA and stable, BP soft, -120s digoxin given per orders. Difficult to obtain continuous O2 sat due to poor circulation, with heat pack spot check stable. Tele: Afib RVR. Torres patent, adequate output. IVF per orders. NPO with plan for surgery this evening. Pt's two sons at bedside throughout shift.     Bennie wipes done at 1650. Pt down to pre-op at 1700. Two sons down to procedure with her.

## 2024-10-08 NOTE — PROGRESS NOTES
MD Notification    Notified Person: MD    Notified Person Name: Ericnyla Strickland MD    Notification Date/Time: 10/8/24 at 444    Notification Interaction: vocera    Purpose of Notification: Rhythm change. New afib rvr. High as 140s, but sustaining low 100s.     Orders Received: Mag lab added and mag protocol.     Comments:

## 2024-10-08 NOTE — PROGRESS NOTES
[facility] Progress Note  Date of Service: 10/08/2024  Staff Cardiologist: Dr. Troncoso     Assessment & Plan   Benita Hubbard is a 89 year old female admitted on 10/6/2024.     Interval History:   Patient in Afib with RVR with rates 110-130's. Hypotensive with SBP 80-90's. She is asymptomatic. Cr 1.02, GFR 51.      Assessment:   Mild troponin elevation, likely demand  Atrial fibrillation with RVR, new   Left hip neck fracture  Presumed stress-induced cardiomyopathy with LVEF 40-45%  Hx of non-ischemic cardiomyopathy with LVEF 50-55%      Plan:  Give IV digoxin 500 mcg x once for rate control.   Start low dose metoprolol pending improvement in blood pressure.   Replace lytes per protocol.   Okay to proceed with hip replacement later today.   Will arrange outpatient cardiology follow-up in 1 month.     Lisa Velasquez, CNP  Pager:  [phone]  (7am - 5pm, M-F)      Physical Exam   Temp: 97.3  F (36.3  C) Temp src: Oral BP: 90/60 Pulse: 107   Resp: 16 SpO2: 100 % O2 Device: None (Room air) Oxygen Delivery: 4 LPM  Vitals:    10/07/24 1936 10/08/24 0604   Weight: 64.4 kg (142 lb) 51.6 kg (113 lb 12.1 oz)       Constitutional:   NAD   Skin:   Warm and dry   Head:   Nontraumatic   Neck:   no JVD   Lungs:   normal   Cardiovascular:   irregularly irregular rhythm   Abdomen:   Benign   Extremities and Back:   No edema   Neurological:   Grossly nonfocal       Medications   Current Facility-Administered Medications   Medication Dose Route Frequency Provider Last Rate Last Admin    dextrose 5% and 0.9% NaCl infusion   Intravenous Continuous Kathryn Stafford MD 75 mL/hr at 10/08/24 0900 New Bag at 10/08/24 0900     Current Facility-Administered Medications   Medication Dose Route Frequency Provider Last Rate Last Admin    piperacillin-tazobactam (ZOSYN) 2.25 g vial to attach to  ml bag  2.25 g Intravenous Q6H Kathryn Stafford MD   2.25 g at 10/08/24 0832    sodium  chloride (PF) 0.9% PF flush 3 mL  3 mL Intracatheter Q8H Karma Emery MD   3 mL at 10/08/24 0300       Data     Most Recent 3 CBC's:  Recent Labs   Lab Test 10/08/24  0630 10/07/24  2257 10/07/24  1126   WBC 7.7 9.2 11.7*   HGB 11.9 12.4 12.9   MCV 95 95 95   PLT 85* 89* 108*     Most Recent 3 BMP's:  Recent Labs   Lab Test 10/08/24  0630 10/07/24  2257 10/07/24  1811 10/07/24  1313 10/07/24  1219 10/07/24  1138     --  136  --   --  136   POTASSIUM 4.6 5.4* 5.1  5.1   < >  --  5.3   CHLORIDE 108*  --  102  --   --  101   CO2 23  --  21*  --   --  22   BUN 41.6*  --  52.6*  --   --  49.8*   CR 1.02*  --  1.06*  --   --  1.09*   ANIONGAP 8  --  13  --   --  13   SHORTY 8.1*  --  8.4*  --   --  8.8   *  --  169*  --  152* 169*    < > = values in this interval not displayed.     Most Recent 3 Troponin's:  Recent Labs   Lab Test 09/19/21  1025   TROPONIN <0.015         Medical Decision Making       30 MINUTES SPENT BY ME on the date of service doing chart review, history, exam, documentation & further activities per the note.      Clinically Significant Risk Factors        # Hyperkalemia: Highest K = 5.7 mmol/L in last 2 days, will monitor as appropriate  # Hyponatremia: Lowest Na = 133 mmol/L in last 2 days, will monitor as appropriate  # Hypocalcemia: Lowest Ca = 8.1 mg/dL in last 2 days, will monitor and replace as appropriate    # Anion Gap Metabolic Acidosis: Highest Anion Gap = 19 mmol/L in last 2 days, will monitor and treat as appropriate    # Thrombocytopenia: Lowest platelets = 85 in last 2 days, will monitor for bleeding   # Hypertension: Noted on problem list

## 2024-10-08 NOTE — CONSULTS
Trauma Surgery Consultation    Benita Hubbard MRN#: [id number]   Age: 89 year old YOB: 1931     The surgical service was consulted by Kathryn Stafford MD to evaluate and/or treat this patient for trauma evaluation.    Date of Admission:          10/6/2024       Assessment:   Benita Hubbard is a 89 year old female with h/o  HTN, non ischemic Cardiomyopathy, CAD, neuropathy, history of UTI  was found on the floor in her independent apartment for an unknown period of time (found on welfare check) and was brought to the ER by EMS.  Hospital course thus far notable for 2 RRT's in the setting of chest pain and shortness of breath, followed by lactic acidosis with hyperkalemia; both of which have resolved.  Has since developed tachyarrhythmia for which cardiology is consulted.    Trauma surgery was consulted for tertiary examination.  No additional pathological injuries were found on tertiary exam.  Known injuries include displaced and angulated left femoral neck fracture, nondisplaced fractures of the left pubic bone and left inferior and superior pubic rami.  Age-indeterminate compression fractures of the T5 and L4 3 vertebral bodies, no midline bony tenderness on physical exam.         Plan:   -- No additional imaging indicated at this time  -- Consider palliative consult if patient wishes to pursue hospice  -- Please reach out to trauma team for further evaluation if there are concerns for any new injuries  -- Trauma surgery will follow peripherally at this time  ___________________________________________________________________         Chief Complaint:     Chief Complaint   Patient presents with    Fall          History of Present Illness:   Benita Hubbard is a 89 year old female with h/o  HTN, non ischemic Cardiomyopathy, CAD, neuropathy, history of UTI  was found on the floor in her independent apartment for an unknown period of time (found on welfare check) and was brought to the ER by  "EMS.  She was found to have left femoral neck fracture and admitted on 10/6/2024 for further management.     She does not recall all the events leading to her fall.  She believes that she had gotten out of bed to grab the newspaper when she had lost balance and fallen.  She is unsure how long she was on the floor for prior to a welfare check.  Additional history is provided by the patient's 2 sons.  She has a history of a prior fall approximately 5 years ago where she fell backwards and hit her head.  Her sons also report that patient has had some mild cognitive impairment, namely not knowing her age and what year it is, but had otherwise been living independently and driving up until last year.         Past Medical History:     Past Medical History:   Diagnosis Date    Cardiomyopathy due to hypertension (H)     Cardiomyopathy, nonischemic (H)     Coronary artery disease     Diverticulitis of colon     Hypertension     Neuropathy     Osteoporosis     Palmar plantar dysesthesia     Small fiber neuropathy     UTI (lower urinary tract infection)           Past Surgical History:     Past Surgical History:   Procedure Laterality Date    CORONARY ANGIOGRAPHY ADULT ORDER  4/14/14    normal Coronary arteries    HYSTERECTOMY, PAP NO LONGER INDICATED      ovaries still in place    ORTHOPEDIC SURGERY      Left hand \"hard lumps\" excised    SEPTOPLASTY      SURGICAL HISTORY OF -       vein stripping right leg            Medications:     Prior to Admission medications    Medication Sig Start Date End Date Taking? Authorizing Provider   acetaminophen (TYLENOL) 325 MG tablet Take 2 tablets (650 mg) by mouth 2 times daily as needed for mild pain ; maximum 4 grams/day of acetaminophen from all sources. 9/20/21  Yes Thom Felder MD   alendronate (FOSAMAX) 70 MG tablet Take 70 mg by mouth every 7 days Takes on Saturdays   Yes Unknown, Entered By History   calcium-vitamin D (CALTRATE) 600-400 MG-UNIT per tablet Take 1 tablet by " mouth daily   Yes Unknown, Entered By History   lisinopril (PRINIVIL,ZESTRIL) 5 MG tablet Take 1 tablet (5 mg) by mouth daily 7/29/15  Yes Adalgisa Rosario MD   metoprolol succinate ER (TOPROL-XL) 25 MG 24 hr tablet Take 1 tablet (25 mg) by mouth every evening 9/20/21  Yes Thom Felder MD   mometasone (NASONEX) 50 MCG/ACT nasal spray Spray 2 sprays into both nostrils daily as needed   Yes Unknown, Entered By History   pregabalin (LYRICA) 50 MG capsule Take 50 mg by mouth every evening   Yes Unknown, Entered By History          Current Medications:         Current Facility-Administered Medications   Medication Dose Route Frequency Provider Last Rate Last Admin    piperacillin-tazobactam (ZOSYN) 2.25 g vial to attach to  ml bag  2.25 g Intravenous Q6H Kathryn Stafford MD   2.25 g at 10/08/24 0832    sodium chloride (PF) 0.9% PF flush 3 mL  3 mL Intracatheter Q8H Karma Emery MD   3 mL at 10/08/24 0300     Current Facility-Administered Medications   Medication Dose Route Frequency Provider Last Rate Last Admin    acetaminophen (TYLENOL) tablet 650 mg  650 mg Oral Q4H PRN Karma Emery MD        Or    acetaminophen (TYLENOL) Suppository 650 mg  650 mg Rectal Q4H PRN Karma Emery MD   650 mg at 10/08/24 0746    calcium carbonate (TUMS) chewable tablet 1,000 mg  1,000 mg Oral 4x Daily PRN Karma Emery MD        glucose gel 15-30 g  15-30 g Oral Q15 Min PRN Kathryn Stafford MD        Or    dextrose 50 % injection 25-50 mL  25-50 mL Intravenous Q15 Min PRN Kathryn Stafford MD        Or    glucagon injection 1 mg  1 mg Subcutaneous Q15 Min PRN Kathryn Stafford MD        fluticasone (FLONASE) 50 MCG/ACT spray 2 spray  2 spray Both Nostrils Daily PRN Karma Emery MD        lidocaine (LMX4) cream   Topical Q1H PRN Karma Emery MD        lidocaine 1 % 0.1-1 mL  0.1-1 mL Other Q1H PRN Karma Emery MD        methocarbamol (ROBAXIN) half-tab 250 mg  250 mg Oral 4x Daily  PRN Nunu Jones PA-C        naloxone (NARCAN) injection 0.2 mg  0.2 mg Intravenous Q2 Min PRN Nunu Jones PA-C        Or    naloxone (NARCAN) injection 0.4 mg  0.4 mg Intravenous Q2 Min PRN Nunu Jones PA-C        Or    naloxone (NARCAN) injection 0.2 mg  0.2 mg Intramuscular Q2 Min PRN Nunu Jones PA-C        Or    naloxone (NARCAN) injection 0.4 mg  0.4 mg Intramuscular Q2 Min PRN Nunu Jones PA-C        ondansetron (ZOFRAN ODT) ODT tab 4 mg  4 mg Oral Q6H PRN Karma Emery MD        Or    ondansetron (ZOFRAN) injection 4 mg  4 mg Intravenous Q6H PRN Karma Emery MD   4 mg at 10/07/24 1233    senna-docusate (SENOKOT-S/PERICOLACE) 8.6-50 MG per tablet 1 tablet  1 tablet Oral BID PRKarma Bar MD        Or    senna-docusate (SENOKOT-S/PERICOLACE) 8.6-50 MG per tablet 2 tablet  2 tablet Oral BID Karma Whitehead MD        sodium chloride (PF) 0.9% PF flush 3 mL  3 mL Intracatheter q1 min Karma Whitehead MD                Allergies:     Allergies   Allergen Reactions    Cephalexin Other (See Comments)     Dizzy and chest tightness    Ciprofloxacin Other (See Comments)     Neuropathy bottom of feet      Nitrofurantoin Other (See Comments)     Neuropathy bottom of feet    Ofloxacin GI Disturbance     Numbness    Sulfa Antibiotics      Coated tongue          Social History:     Social History     Tobacco Use    Smoking status: Former     Current packs/day: 0.00     Types: Cigarettes     Quit date: 1969     Years since quittin.2    Smokeless tobacco: Never    Tobacco comments:     light smoker--1-2 cigarettes per day   Substance Use Topics    Alcohol use: Yes     Comment: rare          Family History:     Family History   Problem Relation Age of Onset    Cerebrovascular Disease Mother     Cancer - colorectal Father     Heart Disease No family hx of              Review of Systems:   The 12 point Review of Systems is negative other than noted in  "the HPI.         Physical Exam:   BP 90/60   Pulse 107   Temp 97.3  F (36.3  C) (Oral)   Resp 16   Ht 1.651 m (5' 5\")   Wt 51.6 kg (113 lb 12.1 oz)   SpO2 100%   BMI 18.93 kg/m      GENERAL: Sitting semiupright in bed, family at bedside  SKIN: Warm and well perfused.  Bilateral forearm ecchymoses and senile purpura  HEAD: Facial bones without deformities or tenderness.  Chin ecchymosis  EYES: PERRL. No scleral icterus or conjunctival injection. Extraocular muscles intact without nystagmus or diplopia. No proptosis or enophthalmos.  EARS: Normal appearing pinnae.   NOSE: No discharge, tenderness, laxity. No nasal septal hematoma.  MOUTH: No malocclusion or trismus. Moist mucus membranes without blood. Posterior pharynx without erythema or exudate.  NECK: Trachea midline. No discolorations or edema.  Supple  CV: Intermittent atrial fibrillation on telemetry  CHEST: No abrasions or ecchymosis. Chest symmetric with respirations. No chest wall tenderness. No crepitus. No step offs. Lungs are clear to auscultation bilaterally. No rales, rhonchi, wheezing or stridor.  ABDOMEN: No ecchymosis or abrasions. Soft, nondistended, nontender. Bowel tones normoactive.  BACK: No abrasions, skin openings, or ecchymosis. Spine without bony tenderness, no step offs.  Mepilex over left scapula  PELVIC: Pelvis stable, nontender to lateral compression and palpation of symphysis pubis.  : Torres in place draining michael-colored urine  MSK: External rotation of left lower extremity, no gross deformities of bilateral uppers or right lower extremity  NEURO: Alert and oriented to person, place. GCS 15/15. CN II-XII intact. Sensation grossly intact.  Strength 3/5 in bilateral upper extremities, strength 3/5 in right lower extremity, strength exam deferred in the left lower extremity         Data:   Labs:  Recent Labs   Lab Test 10/08/24  0630 10/07/24  2257 10/07/24  1126   WBC 7.7 9.2 11.7*   HGB 11.9 12.4 12.9   HCT 36.9 38.8 39.9 "   PLT 85* 89* 108*     Recent Labs   Lab Test 10/08/24  0630 10/07/24  2257 10/07/24  1811 10/07/24  1313 10/07/24  1219 10/07/24  1138   POTASSIUM 4.6 5.4* 5.1  5.1   < >  --  5.3   CHLORIDE 108*  --  102  --   --  101   CO2 23  --  21*  --   --  22   BUN 41.6*  --  52.6*  --   --  49.8*   CR 1.02*  --  1.06*  --   --  1.09*   *  --  169*  --  152* 169*    < > = values in this interval not displayed.     Recent Labs   Lab Test 10/07/24  1313 10/06/24  1158 09/29/17  0902   BILITOTAL 0.6 0.5  --    DBIL 0.22  --   --    ALT 28 28 <5*   AST 43  --   --    ALKPHOS 57 63  --      Results for orders placed or performed during the hospital encounter of 10/06/24   Head CT w/o contrast    Impression    IMPRESSION:  1.  No CT evidence for acute intracranial process.  2.  Brain atrophy and presumed chronic microvascular ischemic changes as above.   CT Cervical Spine w/o Contrast    Impression    IMPRESSION:  1.  Lower cervical and thoracic residual from previous trauma. No interval change.    2.  Multilevel cervical spondylosis.    3.  No acute fractures or evidence of traumatic malalignment.   XR Pelvis w Hip Left 1 View    Impression    IMPRESSION: Acute mildly displaced left femoral neck fracture. Left femoral head remains appropriately located within the acetabulum. Asymmetric contour deformity cortical irregularity at the junction of the left pubic tubercle and left superior pubic   ramus as well as along the left inferior pubic ramus, suggestive of additional fractures. Mild joint space narrowing both hips. Severe diffuse osseous demineralization. Limited evaluation of the sacrum secondary to overlying bowel gas.   XR Chest 1 View    Impression    IMPRESSION:     Cardiac enlargement with particular enlargement of the left ventricular heart border. Mild aortic atheromatous calcifications. Vascular pedicle with is not increased. Lucency at the left tracheobronchial angle reflects retained air in the esophagus and    suggests esophageal dysmotility.    Symmetric lung inflation. There are no focal airspace opacities. No findings to suggest interstitial lung edema. No menisci in the bases to indicate pleural effusions.    Disc space narrowing particularly of the mid thoracic vertebra and marginal osteophytes. No acute displaced rib fractures are detected.   XR Shoulder Left G/E 3 Views    Impression    IMPRESSION: No fracture or malalignment. Soft tissue calcination projecting over the greater tuberosity which is favored to represent calcific tendinitis. Diffuse osseous demineralization which limits evaluation for nondisplaced fractures and subtle   osseous lesions.   CT Chest (PE) Abdomen Pelvis w Contrast    Impression    IMPRESSION:  1.  No evidence for pulmonary embolism.  2.  Small bilateral pleural effusions, larger on the right.  3.  Patchy airspace opacity at the right lung base posteriorly may be  related to atelectasis or pneumonia.  4.  There is diffuse gallbladder wall enhancement. If there is  clinical suspicion for cholecystitis, gallbladder ultrasound would be  recommended.  5.  Mild diffuse bladder wall thickening and enhancement are  nonspecific, but could be related to cystitis.  6.  Age-indeterminate compressions of the T5 and L3 vertebral bodies.  7.  Displaced and angulated acute fracture of the left femoral neck,  as well as nondisplaced fractures of the left pubic bone and left  inferior and superior pubic rami.  8.  Small amount of ascites.    RICH MORRISON MD         SYSTEM ID:  [id number]   US Abdomen Limited    Impression    IMPRESSION:  1.  No ultrasound evidence of acute cholecystitis.  2.  Trace ascites.  3.  Small right pleural effusion.     Echocardiogram Complete   Result Value Ref Range    LVEF  40-45%      Sedrick Mcintyre MD   General Surgery, PGY-4  General Surgery and Acute Care Surgery  Pager: [phone]     FACULTY NOTE  I saw and evaluated the patient today October 8, 2024.  I  discussed with the resident and agree with the resident s findings and plan documented in the resident s note from above. Any revisions by me are documented.    No new injuries discovered on this tertiary examination.  No rib fractures, no pain with breathing.  Plan for an ORIF of her hip today.  No trauma issues that would stop that plan.      Will sign off, please call if other trauma issues arise.    John Howell M.D., F.A.C.S.  Department of Surgery  [facility]

## 2024-10-09 ENCOUNTER — APPOINTMENT (OUTPATIENT)
Dept: PHYSICAL THERAPY | Facility: CLINIC | Age: 89
DRG: 521 | End: 2024-10-09
Payer: MEDICARE

## 2024-10-09 LAB
ANION GAP SERPL CALCULATED.3IONS-SCNC: 8 MMOL/L (ref 7–15)
BACTERIA UR CULT: ABNORMAL
BACTERIA UR CULT: ABNORMAL
BUN SERPL-MCNC: 33.2 MG/DL (ref 8–23)
CALCIUM SERPL-MCNC: 8.2 MG/DL (ref 8.8–10.4)
CHLORIDE SERPL-SCNC: 109 MMOL/L (ref 98–107)
CREAT SERPL-MCNC: 0.86 MG/DL (ref 0.51–0.95)
EGFRCR SERPLBLD CKD-EPI 2021: 63 ML/MIN/1.73M2
ERYTHROCYTE [DISTWIDTH] IN BLOOD BY AUTOMATED COUNT: 14.3 % (ref 10–15)
GLUCOSE BLDC GLUCOMTR-MCNC: 109 MG/DL (ref 70–99)
GLUCOSE SERPL-MCNC: 118 MG/DL (ref 70–99)
HCO3 SERPL-SCNC: 22 MMOL/L (ref 22–29)
HCT VFR BLD AUTO: 34.9 % (ref 35–47)
HGB BLD-MCNC: 11.2 G/DL (ref 11.7–15.7)
MAGNESIUM SERPL-MCNC: 2.1 MG/DL (ref 1.7–2.3)
MCH RBC QN AUTO: 30.8 PG (ref 26.5–33)
MCHC RBC AUTO-ENTMCNC: 32.1 G/DL (ref 31.5–36.5)
MCV RBC AUTO: 96 FL (ref 78–100)
PLATELET # BLD AUTO: 60 10E3/UL (ref 150–450)
POTASSIUM SERPL-SCNC: 4.8 MMOL/L (ref 3.4–5.3)
RBC # BLD AUTO: 3.64 10E6/UL (ref 3.8–5.2)
SODIUM SERPL-SCNC: 139 MMOL/L (ref 135–145)
WBC # BLD AUTO: 8 10E3/UL (ref 4–11)

## 2024-10-09 PROCEDURE — 80048 BASIC METABOLIC PNL TOTAL CA: CPT | Performed by: INTERNAL MEDICINE

## 2024-10-09 PROCEDURE — 99233 SBSQ HOSP IP/OBS HIGH 50: CPT | Mod: FS | Performed by: NURSE PRACTITIONER

## 2024-10-09 PROCEDURE — 97161 PT EVAL LOW COMPLEX 20 MIN: CPT | Mod: GP

## 2024-10-09 PROCEDURE — 250N000011 HC RX IP 250 OP 636: Performed by: HOSPITALIST

## 2024-10-09 PROCEDURE — 250N000013 HC RX MED GY IP 250 OP 250 PS 637: Performed by: HOSPITALIST

## 2024-10-09 PROCEDURE — 97110 THERAPEUTIC EXERCISES: CPT | Mod: GP

## 2024-10-09 PROCEDURE — 250N000011 HC RX IP 250 OP 636

## 2024-10-09 PROCEDURE — 250N000013 HC RX MED GY IP 250 OP 250 PS 637

## 2024-10-09 PROCEDURE — 83735 ASSAY OF MAGNESIUM: CPT | Performed by: INTERNAL MEDICINE

## 2024-10-09 PROCEDURE — 250N000011 HC RX IP 250 OP 636: Performed by: INTERNAL MEDICINE

## 2024-10-09 PROCEDURE — 99232 SBSQ HOSP IP/OBS MODERATE 35: CPT | Performed by: HOSPITALIST

## 2024-10-09 PROCEDURE — 250N000013 HC RX MED GY IP 250 OP 250 PS 637: Performed by: NURSE PRACTITIONER

## 2024-10-09 PROCEDURE — 250N000013 HC RX MED GY IP 250 OP 250 PS 637: Performed by: INTERNAL MEDICINE

## 2024-10-09 PROCEDURE — 210N000002 HC R&B HEART CARE

## 2024-10-09 PROCEDURE — 85027 COMPLETE CBC AUTOMATED: CPT | Performed by: INTERNAL MEDICINE

## 2024-10-09 PROCEDURE — 97530 THERAPEUTIC ACTIVITIES: CPT | Mod: GP

## 2024-10-09 PROCEDURE — 36415 COLL VENOUS BLD VENIPUNCTURE: CPT | Performed by: INTERNAL MEDICINE

## 2024-10-09 RX ORDER — ENOXAPARIN SODIUM 100 MG/ML
40 INJECTION SUBCUTANEOUS EVERY 24 HOURS
Status: DISCONTINUED | OUTPATIENT
Start: 2024-10-09 | End: 2024-10-09

## 2024-10-09 RX ADMIN — ENOXAPARIN SODIUM 40 MG: 40 INJECTION SUBCUTANEOUS at 10:16

## 2024-10-09 RX ADMIN — PIPERACILLIN AND TAZOBACTAM 2.25 G: 2; .25 INJECTION, POWDER, FOR SOLUTION INTRAVENOUS at 09:10

## 2024-10-09 RX ADMIN — ACETAMINOPHEN 650 MG: 650 SUPPOSITORY RECTAL at 01:15

## 2024-10-09 RX ADMIN — CLINDAMYCIN PHOSPHATE 600 MG: 600 INJECTION, SOLUTION INTRAVENOUS at 12:10

## 2024-10-09 RX ADMIN — PIPERACILLIN AND TAZOBACTAM 2.25 G: 2; .25 INJECTION, POWDER, FOR SOLUTION INTRAVENOUS at 15:28

## 2024-10-09 RX ADMIN — ACETAMINOPHEN 975 MG: 325 TABLET ORAL at 15:28

## 2024-10-09 RX ADMIN — CLINDAMYCIN PHOSPHATE 600 MG: 600 INJECTION, SOLUTION INTRAVENOUS at 18:14

## 2024-10-09 RX ADMIN — POLYETHYLENE GLYCOL 3350 17 G: 17 POWDER, FOR SOLUTION ORAL at 08:30

## 2024-10-09 RX ADMIN — ACETAMINOPHEN 975 MG: 325 TABLET ORAL at 08:30

## 2024-10-09 RX ADMIN — PIPERACILLIN AND TAZOBACTAM 2.25 G: 2; .25 INJECTION, POWDER, FOR SOLUTION INTRAVENOUS at 04:23

## 2024-10-09 RX ADMIN — CLINDAMYCIN PHOSPHATE 600 MG: 600 INJECTION, SOLUTION INTRAVENOUS at 01:18

## 2024-10-09 RX ADMIN — ACETAMINOPHEN 975 MG: 325 TABLET ORAL at 22:56

## 2024-10-09 RX ADMIN — PIPERACILLIN AND TAZOBACTAM 2.25 G: 2; .25 INJECTION, POWDER, FOR SOLUTION INTRAVENOUS at 21:07

## 2024-10-09 RX ADMIN — SENNOSIDES AND DOCUSATE SODIUM 1 TABLET: 8.6; 5 TABLET ORAL at 21:07

## 2024-10-09 RX ADMIN — APIXABAN 2.5 MG: 2.5 TABLET, FILM COATED ORAL at 21:07

## 2024-10-09 RX ADMIN — SENNOSIDES AND DOCUSATE SODIUM 1 TABLET: 8.6; 5 TABLET ORAL at 09:49

## 2024-10-09 RX ADMIN — CLINDAMYCIN PHOSPHATE 600 MG: 600 INJECTION, SOLUTION INTRAVENOUS at 05:57

## 2024-10-09 RX ADMIN — SENNOSIDES AND DOCUSATE SODIUM 1 TABLET: 50; 8.6 TABLET ORAL at 08:31

## 2024-10-09 ASSESSMENT — ACTIVITIES OF DAILY LIVING (ADL)
ADLS_ACUITY_SCORE: 52
ADLS_ACUITY_SCORE: 48
DEPENDENT_IADLS:: MEAL PREPARATION;TRANSPORTATION
ADLS_ACUITY_SCORE: 48
ADLS_ACUITY_SCORE: 52
ADLS_ACUITY_SCORE: 48
ADLS_ACUITY_SCORE: 52
ADLS_ACUITY_SCORE: 48
ADLS_ACUITY_SCORE: 52
ADLS_ACUITY_SCORE: 48

## 2024-10-09 NOTE — PLAN OF CARE
Pt disoriented place, time and situation, 2LNC, bedrest and full code. Tele afib cvr. Torres in place for retention. Pt had L him hemiarthroplasty and arrived back to the unit at 2230. Surgical site WDL. Pt endorses pain on movement and treated with rectal tylenol. LR 50ml/hr until 4am. Clindamycin q6hr and Zosyn q6hrs. Q2 turns. Mag protocol.   Plan: continue plan of care. PT/OT and cards following.

## 2024-10-09 NOTE — OP NOTE
[facility]  Orthopedic Operative Note    Anterior Lateral Hip Hemiarthroplasty     Benita Hubbard MRN# [id number]   YOB: 1931  Procedure Date:  10/8/2024  Age: 89 year old       PREOPERATIVE DIAGNOSIS:    Displaced femoral neck fracture, left hip.  Pathologic fracture secondary to osteoporosis    POSTOPERATIVE DIAGNOSIS:    Same    PROCEDURE PERFORMED:  left hip hemiarthroplasty.  Anterolateral approach.    SURGEON:  HEATHER IBRAHIM MD    FIRST ASSISTANT:  Padmini Joyce PA-C; A skilled first assistant was necessary for this procedure for assistance with patient positioning, prepping, draping, surgical visualization, wound closure, and application of the dressing.    ANESTHESIA:  General plus FI block     ESTIMATED BLOOD LOSS:  100 mL.       IMPLANTS:  .  Implant Name Type Inv. Item Serial No.  Lot No. LRB No. Used Action   IMP STEM FEMORAL AMINTA JOSE C OMNIFIT SZ 5 [id number] [id number] - [id number] Total Joint Component/Insert IMP STEM FEMORAL AMINTA JOSE C OMNIFIT SZ 5 127DEG 7438-2606  [employer] [id number] Left 1 Implanted   BONE CEMENT SIMPLEX FULL DOSE 6191-1-001 - [id number] Cement, Bone BONE CEMENT SIMPLEX FULL DOSE 6191-1-001  [employer] [id number] Left 2 Implanted   IMP SPACER OSTEONICS DISTAL CEMENT 10MM 3667-4438 - [id number] Metallic Hardware/[place] IMP SPACER OSTEONICS DISTAL CEMENT 10MM 8081-2123  [employer] [id number] Left 1 Implanted   IMP HEAD STRK FEMORAL C-TAPER COCR LFIT 28MM +0MM  - [id number] Total Joint Component/Insert IMP HEAD STRK FEMORAL C-TAPER COCR LFIT 28MM +0MM   [employer] [id number] Left 1 Implanted   IMP HEAD STRK FEMORAL UHR BIPOLAR [id number] UH1-45-28 - [id number] Total Joint Component/Insert IMP HEAD STRK FEMORAL UHR BIPOLAR [id number] UH1-45-28  [employer] [id number] Left 1 Implanted        INDICATIONS FOR PROCEDURE:  Benita Hubbard is a 89 year old female with a history of cardiomyopathy,  cognitive impairment who presented and rhabdomyolysis, hyperkalemia, hyponatremia and found to have a left displaced femoral neck fracture.  Unknown mechanism of injury.  The patient was found on the floor.  She uses her walker mostly for mobility and a mechanical fall was suspected.  She lives in a independent apartment building and was found on a welfare check.  She was admitted for medicine and has been treated for elevated troponins, A-fib with RVR and concern for possible UTI.  She has been on Zosyn.  Her symptoms have improved and she was considered medically optimized for surgery.    Her radiographs were reviewed and revealed a displaced left femoral neck fracture.  Mild osteoarthritis.    I met with her sons.  We discussed potential treatment options including nonoperative and operative intervention along with risks and benefits and recovery.  I recommended surgical intervention including a hemiarthroplasty to allow for earlier mobilization and function.  We discussed risk related to surgery.  Alternatives such as nonoperative care was discussed but not recommended.  After thorough discussion, they are in agreement like to proceed with a left hip hemiarthroplasty.    We discussed nonoperative and various operative treatment options including hemiarthroplasty and total hip arthroplasty and I recommended a hip hemiarthroplasty due to the patients activity level, prior hip function, and minimal osteoarthritis.    Risks of bleeding, infection, damage to surrounding neurovascular structures, hip dislocation, intraoperative or postoperative periprosthetic fracture, leg length inequality, blood clots including deep vein thrombosis, acetabular arthritis and need for revision to a total hip arthroplasty, revision surgery, and pulmonary embolism and anesthetic complications were discussed with patient.  Benefits of surgery discussed included improved function, reduction medical risk of hip fractures, and pain relief.   Alternatives include nonoperative management, which was not recommended.      The patient and family understands and wishes to proceed.      Consent signed by patient's sons     DESCRIPTION OF PROCEDURE:  The patient was identified in the preoperative holding area per hospital policy and the correct operative site marked. Patient was brought into the to the operating room and placed supine on the operating room table.  After induction of anesthesia the patient was placed into a lateral decubitus position on hip positioner and all bony prominences well-padded. Chlorhexidine prescrub was performed followed by prepping and draping in normal sterile fashion.  Antibiotic administration (Vanco) and transexamic acid administration were confirmed. A WHO timeout was performed to confirm the correct patient, surgery, and surgical site per standard protocol.       A lateral incision was made centered over the greater trochanter proceeding sharply the skin and subtenons tissue down the fascia.  Fascia was incised in line with the incision and a Charnley retractor was placed. A bursectomy was performed.  The anterior one third of the abductors were lifted off the anterior aspect of the femur using cautery and distally inline with the vastus lateralis.   A #5 Ethibond was used to tag of the gluteus medius.  A T-capsulotomy was performed.  The gluteus minimus and capsule were tagged with a #5 Ethibond.  Posteromedial release was performed extending to the superior aspect the lesser trochanter.  The femoral neck was cut maintaining approximately 12 mm of neck proximal to the lesser trochanter. The head was removed with a Schanz pin and Cob.  The acetabulum was found to have mild degenerative changes.     We then turned our attention to preparation of the acetabulum. The femoral head was measured and femoral head trials were placed. A 45 femoral head was found to have good fit and suction seal.       We then turned our attention to  preparation of the femur.  A femoral elevator and a pointed Hohmann were used to visualize the proximal end of the femur and protect the abductors.  The greater trochanter was identified and a  was utilized to remove the lateral femoral neck bone.  The femur was then instrumented with a canal finder followed by a lateralizing reamer.  The femoral canal was then reamed up sequentially to a size 5 and broached to a size 5.  A trial stem was placed on the broach.  The tip of the greater trochanter was in line with the center of the tip of the trial stem.    The femoral canal was then irrigated and prepared for cementing. A cement plug was placed about 2cm distal to the end of the stem. Bone cement was placed with a cement gun from distal to proximal. The final size 5 stem was then placed. The stem was held in place until the cement was hard.     A femoral head trail was placed and a 48/28mm bipolar femoral head was found to be stable in all direction with good length length. The trials were removed.  The final 48/28mm+0mm bipolar femoral head implant was then placed and tested to confirm it was secure. The hip was reduced gently.  The hip was ranged in flexion/extension as well as internal and external rotation and felt to be stable in all directions.  There was no impingement.  The patient was found to have good leg lengths.     The wound was then copiously irrigated with pulsavac.  We repaired the capsule with #5 Ethibond.  Vancomycin powder was placed the gluteus medius and minimus were then repaired with 3 Ethibond sutures which were placed in a Blaine-Sonny stitch fashion and passed through 3 bone tunnels.  This layer was then oversewn with figure 8 Ethibond suture and a running #1 Ethibond.  The IT band was then closed with #1 Vicryl sutures followed by a running #1 Stratafix suture to create a water-tight seal.  The wound was then copiously irrigated with pulsavac.      The superficial layers were then  closed with 0 Vicryl 2-0 Monocryl running 3-0 Monocryl.  Dermabond was placed on the skin.  An Aquacel dressing was then placed over the skin      The patient was awoken from anesthesia and transported to the recovery room in stable condition, sustaining no complications.     Postoperatively:   1.  Ancef x 24 hours.   2.  Lovenox starting POD1, may transition to Aspirin 325mg daily on discharge for 30 days for DVT prophylaxis.   3.  PACU x-ray.  4.  Weightbearing as tolerated with a walker   5.  Physical therapy/occupational therapy.   6.  Keep dressing on for 2 weeks.  OK to shower. Change dressings if greater than 50% saturation. No submerging wound.  7.  Osteoporosis workup and treatment with primary care provider within 2 months.   8.  Discharge:  Case management social work consult for placement     FOLLOWUP:     1.  Plan 2-week wound check with x-rays (AP and Lateral Xrays).  This could also be done at patients rehab facility with x-rays sent to me at [facility]. Please have TCU provider reach out to my office.  2.  Six-week followup with X-rays.    Please call as soon as possible to make an appointment to be seen in Dr. Titi Julian's clinic in 2 weeks.     Dr. Julian's care coordinator is Liat Vivas. Please contact her at [phone] to schedule an appointment.      Dr. Julian sees patients at 2 clinic locations:  [facility]  [address] [place] [id number]  [facility]   [address] , [address], [place] [address]      Please call the on-call phone number [phone] during evenings, nights and weekends for any urgent needs. Prescription refills must be done during business hours by calling [phone]    Titi Julian MD  [facility]       TITI JULIAN MD

## 2024-10-09 NOTE — ANESTHESIA PROCEDURE NOTES
Airway       Patient location during procedure: OR       Procedure Start/Stop Times: 10/8/2024 6:45 PM  Staff -        Anesthesiologist:  Brodie Man MD       CRNA: Aye Mcfadden APRN CRNA       Performed By: CRNAIndications and Patient Condition       Indications for airway management: abi-procedural       Induction type:intravenous       Mask difficulty assessment: 1 - vent by mask    Final Airway Details       Final airway type: endotracheal airway       Successful airway: ETT - single  Endotracheal Airway Details        ETT size (mm): 7.0       Cuffed: yes       Successful intubation technique: video laryngoscopy       VL Blade Size: Kapoor 3       Grade View of Cords: 1       Adjucts: stylet       Position: Right       Measured from: lips       Secured at (cm): 20       Bite block used: None    Post intubation assessment        Placement verified by: capnometry, equal breath sounds and chest rise        Number of attempts at approach: 1       Number of other approaches attempted: 0       Secured with: tape       Ease of procedure: easy       Dentition: Intact and Unchanged    Medication(s) Administered   Medication Administration Time: 10/8/2024 6:45 PM

## 2024-10-09 NOTE — PROGRESS NOTES
[facility]    Medicine Progress Note - Hospitalist Service    Date of Admission:  10/6/2024    Interval History   Assumed care.  2 sons present on encounter.  Patient feels well, denies pain, tries to verbalize the nature of her fall at her senior living facility.  She remains in A-fib, rate controlled at 89.  See Cardiology note, started on apixaban, DVT PX PE per Ortho discontinued i.e. Lovenox and will confirm no aspirin on discharge.       Assessment & Plan   Benita Hubbard is a 89 year old female with h/o  HTN, non ischemic Cardiomyopathy, CAD, neuropathy, history of UTI  was found on the floor in her independent apartment for an unknown period of time (found on welfare check) and was brought to the ER by EMS.  She was found to have left femoral neck fracture and admitted on 10/6/2024 for further management.      Following admission of 10/6 she had 2 subsequent RRT's.  First RRT early a.m on 10/7 for chest pain and shortness of breath.  Extensive evaluation as outlined below.  Noted to have refractory lactic acidosis throughout the day on 10/7.  Elevated troponin with cardiomyopathy seen by cardiology.  Hyperkalemia which is subsequently improved.  Treated for potential of UTI with antibiotics started.  Given significant amount of fluids.  Labs improved with resolution of lactic acidosis on 10/8 labs.     Lactic acidosis: (resolved)   Multifactorial potential contributing factors   DDX: cardiac (elevated trop and reduced EF) , PE evaluation (negative), infection (Started on rocephin/zosyn), fracture/fall and on floor for unknown time span with potential few days  -Right femoral neck fracture secondary to fall, status post left TONY 10/8/2024  - Patient with elevated lactic acid of 3.4 on admit 10/6   - Given fluids with reduction in lactate to 2.3 on 10/7 initially and then rebound increase 3.5>>4>>3.5   - D5NS at 100 since admit   - Differential diagnosis considered   - Elevated  troponins with cardiology consult >> no intervention probable supply demand per cardiology discussion but noted to have cardiomyopathy  -RV dilation on echo leading to CT PE exclusion (neg PE study)   -Potential underlying UTI placed on ceftriaxone.= then zosyn  -Gallbladder wall thickening based on CT abdomen with right upper quadrant ultrasound negative and normal LFT  -Mild rhabdomyolysis treated with fluids  - Multiple reassessments throughout the day regarding lactic acidosis and follow up.   - 10/8 Lactate normalized now 1.2 >> Exact etiology not confirmed but treated for multiple contributing factors and better.   - LACTATE now normal on 10/8 >> left TONY per Ortho 10/8/2024.  -10/9/2024 denies pain.  Remains in A-fib, rate controlled.  See Cardiology note, started on apixaban, Lovenox DVT PX PE per Ortho discontinued.  Will confirm with Ortho no aspirin on discharge.  Started on PT, [facility].    Hyperkalemia: (resolved)   - At baseline potassium low 5 range and on ACE  - stopped ACE   - Lucero to alleviate urinary emptying   - monitor potassium   - Peak potassium 5.7 >> given fluids with lucero placed   - 10/8 potassium normal 4.6, 10/9/2024 at 4.8, recheck BMP in a.m.    H/o nonischemic cardiomyopathy  Elevated troponin (type 2 MI secondary to supply/demand) tachycardia, acidosis,   Cardiology consult   10/8 new onset of afib, 10/9/2024 rate controlled.  - LVEF was 40-45% in 2014.  Coronary angiogram showed normal coronaries.  LVEF subsequently normalized on ECHO 5/2014   - EKG with RBBB  - Troponin up [id number]  - Cardiology consult with elevated lactate and troponin   - 10/7 ECHO with EF 40-45% Flattened septum with RV pressure/volume overload. RV dilated. Moderate TR, mild MI  - with RV dilation PE excluded with CT   - Discussions with cardiology and no interventions planned. >> still most likely from stress demand    - 10/8 no further cardiac  interventions planned monitor.   - 10/8 afib early this am. (Rate controlled) no interventions planned.  Left TONY 10/8/2024  -10/9/2024, Cardiology started on apixaban, DC Lovenox DVT PX PE per Ortho.  Confirm with Ortho no aspirin on discharge.      Suspected fall, unclear etiology of fall  Left femoral neck fracture, acute, displaced  CT abdomen/pelvis displaced and angulated left femoral neck with nondisplaced fracture of the left pubic bone and left inferior and superior pubic rami.   - Unclear etiology of fall.  She was found on floor.    - Patient does not remember when she fell, and also the mechanism.   -  A bruise in her chin was noted 3 days ago by son, patient did not remember falling then as well.    - Uses walker mostly for mobility.  - Multiple imaging studies including left shoulder x-ray, chest x-ray, pelvis and hip x-ray,   - CT head and CT C-spine completed, noted left femoral neck fracture with displacement.    - 10/7 ortho consult   - 10/8 ortho surgery planned. Aware of pelvic fractures   - 10/8 left TONY 10/8/2024  -Lovenox DVT PX PE transition to apixaban in view of A-fib.  See above.      Acute hypoxic respiratory failure  CT no PE, effusions,   Potential Right lung base pneumonia   Few small pulmonary nodules  - Difficult to evaluate oxygen levels with finger probe, ear probe and head probe.   - When able to get what appears to be good wave forms in mid 90%  - face mask as mouth breather  - CT with no obvious PE, small effusions Left greater than right   - on ceftriaxone for UTI vs PNA initially now on zosyn, will Rx 5 days total. Follow-up US.  - ECHO with 40-45% with holding lasix until after surgery with creatinine   -UCx Aerococcus urinae and sanguinicola; US pending        Rhabdomyolysis  CK mildly elevated at  748.  Suspect related to fall.  -IV hydration and follow  - Repeat 393   -      UTI  Urine culture sent   - UA positive with catheter  - CT with mild diffuse bladder wall  thickening and enhancement ? Cystitis.   - started on ceftriaxone. 10/7 >> prior Urine with aerococcus so changed to zosyn.   - 10/7 changed to zosyn >> await cultures and taper as able.   - 10/8 blood cultures pending as well   -Eating well, DC IV fluids especially in view of IV shortage.    Diffuse Gallbladder wall thickening:   - on CT imaging abdomen   - Liver profile negative.   - 10/7 RUQ ultrasound with no cholecystitis      Thrombocytopenia  Mild thrombocytopenia, platelet count 112.  -Monitor  -Check iron panel, B12 and folate level.  -10/9/2024 platelet count 60,000, CBC in AM.    Compression fractures.   T5 and L3 indeterminate on CT chest this admit (prior imaging suggest prior on imaging)   - 7/2019 xray of lumbar spine with mild L3 compression fracture   - 9/2021 CT chest with age indeterminate T4, mild T5 and T6   - 10/8 attempted to review with radiology but the films are not available here. (Can readdress post surgery>> Patient denies any back pain and imaging appears to suggest noted in past)       Cognitive impairment  Acute encephalopathy   Son reports patient is forgetful and has short-term memory impairment.    - No formal diagnosis of dementia  - CT head no acute injury   - baseline short term memory problems now with lyrica, dilaudid and oxycodone on nights >> Stopped opioids with rectal tylenol.   - Would not resume lyrica after surgery (this was stopped in clinic a few weeks ago)     GOALS of care:   Multiple discussions with sons throughout the day. Patient with instability, lactic acidosis, hyperkalemia, Lethargy, fall.   Patient was able to confirm herself she was DNR/DNI. Family in agreement with supportive treatment with fluids, PE, Cardiac evaluation but not planning to increase level of ICU, intubation, lines.   10/8 updated family with labs and plans for surgery          Diet: Advance Diet as Tolerated: Regular Diet Adult    DVT Prophylaxis: Pneumatic Compression Devices  Torres  Catheter: PRESENT, indication: Acute retention or obstruction  Lines: None     Cardiac Monitoring: None  Code Status: Full Code      Clinically Significant Risk Factors        # Hyperkalemia: Highest K = 5.4 mmol/L in last 2 days, will monitor as appropriate   # Hyperchloremia: Highest Cl = 110 mmol/L in last 2 days, will monitor as appropriate      # Hypocalcemia: Lowest Ca = 8.1 mg/dL in last 2 days, will monitor and replace as appropriate       # Thrombocytopenia: Lowest platelets = 60 in last 2 days, will monitor for bleeding   # Hypertension: Noted on problem list               # Financial/Environmental Concerns: none         Disposition Plan     Medically Ready for Discharge: Anticipated in 2-4 Days     Shaylee Novoa MD  Hospitalist Service  [facility]  Securely message with Vocera (more info)  Text page via [employer] Paging/Directory     Total time 50 minutes for today 10/9/2024 :   time consisted of the following, assuming Patient care with review of records including labs, imaging results, medications, interdisciplinary notes; examination of Patient;  and completing documentation and orders.  Care Management included counseling/discussion with Patient and 2 Sons  regarding current condition including femoral neck fracture, pelvic fracture, A-fib, discharge planning and Coordination of Care time with Nursing  and Specialists, Cardiology and Ortho regarding care plan, management and surveillance, as above.   ______________________________________________________________________  Physical Exam   Vital Signs: Temp: 97  F (36.1  C) Temp src: Temporal BP: 99/69 Pulse: 90   Resp: 16 SpO2: 100 % O2 Device: Nasal cannula Oxygen Delivery: 2 LPM  Weight: 113 lbs 12.12 oz    General Appearance: NAD, alert, talkative, calm, cooperative  Respiratory: Respirations nonlabored room air  Cardiovascular: irregular, rate of 90.  GI: + Soft nontender  MSK left hip ROM not tested, ambulation not  tested  Neuro.  Gross motor tested, nonfocal,   Psych orientation to person and place, talkative. affect calm      Data     I have personally reviewed the following data over the past 24 hrs:    8.0  \   11.2 (L)   / 60 (L)     139 109 (H) 33.2 (H) /  109 (H)   4.8 22 0.86 \       Imaging results reviewed over the past 24 hrs:   Recent Results (from the past 24 hour(s))   XR Pelvis w Hip Port Left  1 View    Narrative    EXAM: XR PELVIS AND HIP PORTABLE LEFT 1 VIEW  LOCATION: [facility]  DATE: 10/8/2024    INDICATION: Status post hip surgery.  COMPARISON: None.      Impression    IMPRESSION: Interval left total hip implant. Hardware appears well seated without acute fracture or dislocation. Bones are markedly demineralized.    Again seen are mildly displaced fractures involving the left superior and inferior pubic rami.

## 2024-10-09 NOTE — CONSULTS
Care Management Initial Consult    General Information  Assessment completed with: Patient, Children, BenitaPapito Kurt  Type of CM/SW Visit: Initial Assessment    Primary Care Provider verified and updated as needed: Yes   Readmission within the last 30 days: no previous admission in last 30 days      Reason for Consult: discharge planning  Advance Care Planning: Advance Care Planning Reviewed: other (see comments) (None)          Communication Assessment  Patient's communication style: spoken language (English or Bilingual)    Hearing Difficulty or Deaf: yes   Wear Glasses or Blind: yes    Cognitive  Cognitive/Neuro/Behavioral: .WDL except  Level of Consciousness: confused  Arousal Level: opens eyes spontaneously  Orientation: disoriented to, time, situation  Mood/Behavior: calm  Best Language: 0 - No aphasia  Speech: spontaneous, clear    Living Environment:   People in home: alone     Current living Arrangements: independent living facility      Able to return to prior arrangements: yes  Living Arrangement Comments: TCU first    Family/Social Support:  Care provided by: self  Provides care for: no one  Marital Status:   Support system: Children          Description of Support System: Supportive, Involved    Support Assessment: Adequate family and caregiver support, Adequate social supports    Current Resources:   Patient receiving home care services: No        Community Resources: None  Equipment currently used at home: walker, rolling  Supplies currently used at home: None    Employment/Financial:  Employment Status: retired        Financial Concerns: none   Referral to Financial Worker: No  Finance Comments: Medicare    Does the patient's insurance plan have a 3 day qualifying hospital stay waiver?  No    Lifestyle & Psychosocial Needs:  Social Determinants of Health     Food Insecurity: Low Risk  (10/8/2024)    Food Insecurity     Within the past 12 months, did you worry that your food would run out  before you got money to buy more?: No     Within the past 12 months, did the food you bought just not last and you didn t have money to get more?: No   Depression: Not at risk (7/10/2024)    Received from [employer] [facility]    PHQ-2     PHQ-2 TOTAL SCORE: 0   Housing Stability: Low Risk  (10/8/2024)    Housing Stability     Do you have housing? : Yes     Are you worried about losing your housing?: No   Tobacco Use: Medium Risk (10/8/2024)    Patient History     Smoking Tobacco Use: Former     Smokeless Tobacco Use: Never     Passive Exposure: Not on file   Financial Resource Strain: Low Risk  (10/8/2024)    Financial Resource Strain     Within the past 12 months, have you or your family members you live with been unable to get utilities (heat, electricity) when it was really needed?: No   Alcohol Use: Not on file   Transportation Needs: Low Risk  (10/8/2024)    Transportation Needs     Within the past 12 months, has lack of transportation kept you from medical appointments, getting your medicines, non-medical meetings or appointments, work, or from getting things that you need?: No   Physical Activity: Not on file   Interpersonal Safety: Low Risk  (10/8/2024)    Interpersonal Safety     Do you feel physically and emotionally safe where you currently live?: Yes     Within the past 12 months, have you been hit, slapped, kicked or otherwise physically hurt by someone?: No     Within the past 12 months, have you been humiliated or emotionally abused in other ways by your partner or ex-partner?: No   Stress: Not on file   Social Connections: Unknown (1/1/2024)    [facility]    Social Connections     Frequency of Communication with Friends and Family: Not on file   Health Literacy: Not on file       Functional Status:  Prior to admission patient needed assistance:   Dependent ADLs:: Independent  Dependent IADLs:: Meal Preparation,  "Transportation  Assesssment of Functional Status: Not at baseline with ADL Functioning, Needs placement in a SNF/TCF for rehabilitation    Mental Health Status:  Mental Health Status: No Current Concerns       Chemical Dependency Status:  Chemical Dependency Status: No Current Concerns             Values/Beliefs:  Spiritual, Cultural Beliefs, Orthodoxy Practices, Values that affect care: no          Values/Beliefs Comment: Birdie    Discussed  Partnership in Safe Discharge Planning  document with patient/family: Yes:     Additional Information:  Per care management consult for discharge planning, chart was reviewed. Patient is a very kind 89 year old female that was admitted to [facility] on 10/6/24. According to the H & P \" h/o  HTN, non ischemic CMP,  was found on the floor and was brought to the ER by EMS.  She is noted to have left femoral neck fracture and admitted on 10/6/2024 for further management\". Anticipated discharge is in 1-2 days. Therapy is recommending for TCU.     Writer met with patient and son's, Servando and Papito at bedside. Writer introduced self and role. Patient shares she lives alone at [facility] in [place]. She has been a resident there for 1.5 years. Patient is able to ambulate with a rolling walker. She typically exercises 3-4 times a week with classes provided at her facility. She also goes on walks around the building. Patient is able to complete all ADL's independently. She walks down to the dining room for 1-2 meals a day. Son also will bring meals over. Patient able to do her own laundry and cleaning. Strong supports from her son's as well. Discussed recommendation for TCU. Patient and family in agreement. Discussed length of stay, purpose and out of pocket costs. Encouraged family to call insurance plan to confirm patient's specific coverage. They would like referrals to [facility] and [facility]. Discussed transportation " options/costs depending on patient's mobility at the time of discharge. Writer shared she would send out referrals and keep patient/family updated. Referrals sent via DOD.      Next Steps: Confirm bed.     Addendum 1118: Message from Florence at [facility] stating that they would have a shared room for patient tomorrow. However, wondering if family would want a private room if one opened. Met with patient and Papito at bedside. Updated them on acceptance. Papito confirmed that they would prefer a private room and would be agreeable to a $45 daily room fee. Message sent to florence in admissions.     Call received from Mikaela denny [facility]. They have no openings until late next week.     EDMUND Flores, [facility] HospitalSW  Social Work- Inpatient Care Coordination  [facility]

## 2024-10-09 NOTE — PROGRESS NOTES
Orthopedic Surgery  Benita Hubbard  10/09/2024     Admit Date:  10/6/2024    POD: 1 Day Post-Op   Procedure(s):  HIP HEMIARTHROPLASTY LEFT  Non-operative fractures of pelvic bone; nondisplaced fracture of the left pubic bone and left inferior and superior pubic rami      Patient resting comfortably in chair after working with physical therapy this morning. Family is at the bedside and discussing next steps with care management team. Son reports that Benita tolerated transition to chair this morning with minimal pain.   No active complaints of pain.   Tolerating oral intake.    Denies nausea or vomiting  Denies chest pain or shortness of breath    Temp:  [97  F (36.1  C)-97.9  F (36.6  C)] 97  F (36.1  C)  Pulse:  [] 90  Resp:  [12-24] 16  BP: ()/(51-71) 99/69  SpO2:  [87 %-100 %] 100 %    Alert and oriented  Dressing is clean, dry, and intact.   Minimal erythema of the surrounding skin.  Bilateral calves are soft, non-tender.  Left lower extremity is NVI.  Sensation intact bilateral lower extremities  Patient able to resist dorsi and plantar flexion bilaterally  3+Dp pulse    Labs:  Recent Labs   Lab Test 10/09/24  0539 10/08/24  2333 10/08/24  0630   WBC 8.0 8.2 7.7   HGB 11.2* 11.2* 11.9   PLT 60* 67* 85*         1. PLAN:   Lovenox starting POD1, may transition to Aspirin 325mg daily on discharge for 30 days for DVT prophylaxis    Mobilize with PT/OT    WBAT with use of assistive device.     Continue current pain regimen   Dressings: Keep intact.  Change if >60% saturated or peeling off.    Follow-up: 2 weeks post-op with Dr. Julian team    2. Disposition   Anticipate d/c to TCU  when medically cleared and progressing in PT.    Nunu Jones PA-C

## 2024-10-09 NOTE — PROGRESS NOTES
Patient is alert with occasional confusion, up with A2 and miladys steady to the chair, patient was up in chair for 6 hrs, tolerated well, s/p left TONY pod # 1, dressing CDI, lucero in place for retention, turn and reposition Q 2 hrs, denies pain, on scheduled tylenol, tele a-fib cvr, PT following, recommend TCU on discharge.

## 2024-10-09 NOTE — PLAN OF CARE
Goal Outcome Evaluation:      Plan of Care Reviewed With: patient, child          Outcome Evaluation: Discharge to TCU when medically ready

## 2024-10-09 NOTE — CONSULTS
Patient has Medicare D through [employer].    Xarelto/Eliquis  --Upon receipt of RX, Discharge Pharmacy can provide 1 mo free.  --Patient will pay 100% of the first $545 in drugs costs ($532 remains; first month will be as much as $579)  --Subsequent fills will be $43/mo.  --lf/when total drug costs exceed $5,030, price will increase to a 25% coinsurance, equivalent to $145/mo.    Colette Simon  Pharmacy Technician/Liaison, Discharge Pharmacy   [phone] (voice or text)  [email or web address]  Available on [facility] and Teams

## 2024-10-09 NOTE — ANESTHESIA PROCEDURE NOTES
Fascia iliaca Procedure Note    Pre-Procedure   Staff -        Anesthesiologist:  Brodie Man MD       Performed By: anesthesiologist       Location: pre-op       Pre-Anesthestic Checklist: patient identified, IV checked, site marked, risks and benefits discussed, informed consent, monitors and equipment checked, pre-op evaluation, at physician/surgeon's request and post-op pain management  Timeout:       Correct Patient: Yes        Correct Procedure: Yes        Correct Site: Yes        Correct Position: Yes        Correct Laterality: Yes        Site Marked: Yes  Procedure Documentation  Procedure: Fascia iliaca       Laterality: left       Patient Position: supine       Patient Prep/Sterile Barriers: mask       Skin prep: Chloraprep       Local skin infiltrated with 1 mL of 1% lidocaine.        Needle Type: insulated       Needle Gauge: 20.        Needle Length (Inches): 4        Ultrasound guided       1. Ultrasound was used to identify targeted nerve, plexus, vascular marker, or fascial plane and place a needle adjacent to it in real-time.       2. Ultrasound was used to visualize the spread of anesthetic in close proximity to the above referenced structure.       3. A permanent image is entered into the patient's record.       4. The visualized anatomic structures appeared normal.       5. There were no apparent abnormal pathologic findings.    Assessment/Narrative         The placement was negative for: blood aspirated, painful injection and site bleeding       Paresthesias: No.       Test dose of mL at.         Test dose negative, 3 minutes after injection, for signs of intravascular, subdural, or intrathecal injection.       Bolus given via needle..        Secured via.        Insertion/Infusion Method: Single Shot       Complications: none       Injection made incrementally with aspirations every 5 mL.    Medication(s) Administered   Bupivacaine 0.25% w/ 1:400K Epi (Injection) - Injection   30 mL -  "10/8/2024 6:05:00 PM   Comments:  Bolus via needle, 30 ml of 0.25% bupivacaine with 1:400,000 epinephrine  Patient tolerated well, was mildly sedated but communicative throughout the procedure.    The surgeon has given a verbal order transferring care of this patient to me for the performance of regional analgesia block for post op pain control. It is requested of me because I am uniquely trained and qualified to perform this block and the surgeon is neither trained nor qualified to perform this procedure.         FOR [facility] ([facility]) ONLY:   Pain Team Contact information: please page the Pain Team Via [employer]. Search \"Pain\". During daytime hours, please page the attending first. At night please page the resident first.      "

## 2024-10-09 NOTE — ANESTHESIA CARE TRANSFER NOTE
Patient: Benita Hubbard    Procedure: Procedure(s):  HIP HEMIARTHROPLASTY       Diagnosis: Closed displaced fracture of left femoral neck (H) [S72.002A]  Diagnosis Additional Information: No value filed.    Anesthesia Type:   General     Note:    Oropharynx: oropharynx clear of all foreign objects and spontaneously breathing  Level of Consciousness: awake  Oxygen Supplementation: face mask  Level of Supplemental Oxygen (L/min / FiO2): 10  Independent Airway: airway patency satisfactory and stable  Dentition: dentition unchanged  Vital Signs Stable: post-procedure vital signs reviewed and stable  Report to RN Given: handoff report given  Patient transferred to: PACU  Comments: Pt awake and able to verbalize needs. ALL monitors on and audible. Spontaneous respiration without difficulty. Pt denies pain. Report given to PACU RN.  Handoff Report: Identifed the Patient, Identified the Reponsible Provider, Reviewed the pertinent medical history, Discussed the surgical course, Reviewed Intra-OP anesthesia mangement and issues during anesthesia, Set expectations for post-procedure period and Allowed opportunity for questions and acknowledgement of understanding      Vitals:  Vitals Value Taken Time   BP 92/57 10/08/24 2050   Temp     Pulse 76 10/08/24 2059   Resp 19 10/08/24 2059   SpO2 98 % 10/08/24 2059   Vitals shown include unfiled device data.    Electronically Signed By: SHRUTHI Cortes CRNA  October 8, 2024  9:00 PM

## 2024-10-09 NOTE — ANESTHESIA POSTPROCEDURE EVALUATION
Patient: Benita Hubbard    Procedure: Procedure(s):  HIP HEMIARTHROPLASTY       Anesthesia Type:  General    Note:  Disposition: Inpatient   Postop Pain Control: Uneventful            Sign Out: Well controlled pain   PONV: No   Neuro/Psych: Uneventful            Sign Out: Acceptable/Baseline neuro status   Airway/Respiratory: Uneventful            Sign Out: Acceptable/Baseline resp. status   CV/Hemodynamics: Uneventful            Sign Out: Detailed CV status               Rate/Rhythm: AFib (afib at time of discharge, patient had period of sinus rhythm for approximately 2/3 of intraop course, flipped back to afib rate 90s in PACU (preop baseline))   Other NRE: NONE   DID A NON-ROUTINE EVENT OCCUR? No           Last vitals:  Vitals Value Taken Time   BP 84/62 10/08/24 2130   Temp 36.1  C (97  F) 10/08/24 2130   Pulse 87 10/08/24 2133   Resp 16 10/08/24 2133   SpO2 91 % 10/08/24 2133   Vitals shown include unfiled device data.    Electronically Signed By: Brodie Man MD  October 9, 2024  3:28 AM

## 2024-10-09 NOTE — PLAN OF CARE
Dicussed anticoagulation with cardiology. Patient with new onset a fib started on Lovenox 40 mg daily post operatively for DVT prophylaxis. May discontinue Lovenox 40 mg in house with  mg on discharge.     Cardiology will prescribe Eliquis. Ortho OK with Eliquis for DVT prophylaxis.

## 2024-10-09 NOTE — PROGRESS NOTES
10/09/24 0800   Appointment Info   Signing Clinician's Name / Credentials (PT) Angeles Easley, SHRADDHA   Living Environment   People in Home alone   Current Living Arrangements independent living facility   Home Accessibility no concerns   Transportation Anticipated family or friend will provide   Living Environment Comments Pt lives at [facility], walks to a dining room for 1-2 meals per day.   Self-Care   Usual Activity Tolerance good   Current Activity Tolerance poor   Regular Exercise No   Equipment Currently Used at Home walker, rolling   Fall history within last six months yes   Number of times patient has fallen within last six months 1   General Information   Onset of Illness/Injury or Date of Surgery 10/06/24   Referring Physician Dr. Emery   Patient/Family Therapy Goals Statement (PT) To walk   Pertinent History of Current Problem (include personal factors and/or comorbidities that impact the POC) Pt is a 89 year old female admitted after a fall resulting in left hip fracture, now s/p left TONY.   Existing Precautions/Restrictions no hip ADD past midline   Weight-Bearing Status - LLE weight-bearing as tolerated   Cognition   Affect/Mental Status (Cognition) WFL   Orientation Status (Cognition) oriented to;person;situation   Follows Commands (Cognition) 75-90% accuracy   Pain Assessment   Patient Currently in Pain No   Range of Motion (ROM)   ROM Comment Guarded left LE ROM   Strength (Manual Muscle Testing)   Strength Comments Right LE grossly 4/5, Left LE grossly 3-/5   Bed Mobility   Comment, (Bed Mobility) Mod A x 2   Transfers   Transfers sit-stand transfer   Comment, (Transfers) Mod/Max A x 2   Balance   Balance Comments Fair in sitting, poor in standing   Clinical Impression   Criteria for Skilled Therapeutic Intervention Yes, treatment indicated   PT Diagnosis (PT) Impaired ambulation   Influenced by the following impairments Decreased strength, decreased endurance, decreased balance   Functional  limitations due to impairments Difficulty with bed mobility, transfers, ambulation   Clinical Presentation (PT Evaluation Complexity) stable   Clinical Presentation Rationale VSS, pain controlled   Clinical Decision Making (Complexity) low complexity   Planned Therapy Interventions (PT) balance training;bed mobility training;gait training;patient/family education;strengthening;transfer training   Risk & Benefits of therapy have been explained evaluation/treatment results reviewed;care plan/treatment goals reviewed;risks/benefits reviewed;current/potential barriers reviewed;participants voiced agreement with care plan;participants included;patient   PT Total Evaluation Time   PT Marco, Low Complexity Minutes (08363) 10   Physical Therapy Goals   PT Frequency 5x/week   PT Predicted Duration/Target Date for Goal Attainment 10/16/24   PT Goals Bed Mobility;Transfers;Gait   PT: Bed Mobility Moderate assist;Supine to/from sit;Rolling;Within precautions   PT: Transfers Moderate assist;Sit to/from stand;Bed to/from chair;Assistive device;Within precautions   PT: Gait Moderate assist;Rolling walker;Within precautions;5 feet   PT Discharge Planning   PT Plan Sitting balance, sit to/from stands, amb with w/c follow as appropriate   PT Discharge Recommendation (DC Rec) Transitional Care Facility   PT Rationale for DC Rec At baseline, pt lives in an independent living apartment. Pt reports intermittent use of FWW, otherwise fairly independent within her apartment. This date, Pt is well below baseline and would be a considerable fall risk if she returned to home environment. Pt currently requires assist of 2 with all mobility and has decreased activity tolerance. Pt will benefit from continued therapy at [facility] to address impairments and increase mobility and functional independence prior to returning home.   PT Brief overview of current status Goals of therapy will be to address safe mobility and make recs for d/c to next level of  care. Pt and RN will continue to follow all falls risk precautions as documented by RN staff while hospitalized. Assist of 2, Kyra Khanna   Total Session Time   Total Session Time (sum of timed and untimed services) 10

## 2024-10-09 NOTE — PROGRESS NOTES
[facility] Cardiology Progress Note  Date of Service: 10/09/2024  Staff Cardiologist: Dr. Troncoso     Assessment & Plan   Benita Hubbard is a 89 year old female admitted on 10/6/2024.       Assessment:   Mild troponin elevation, likely demand  Atrial fibrillation with RVR, new onset, [id number] of 4 not on anticoagulation  Left hip neck fracture s/p left hemiarthroplasty 10/8  Presumed stress-induced cardiomyopathy with LVEF 40-45%  Hx of non-ischemic cardiomyopathy with LVEF 50-55%    Patient remains in Afib with adequate rate control. BP remains soft with SBP 's. She is otherwise hemodynamically stable. She will need anticoagulation for stroke prophylaxis, discussed with orthopedic surgery. Okay to initiate AC today.       Plan:  Start Eliquis 5 mg BID for stroke prophylaxis.   Per ortho, okay to stop ASA at discharge.   14-day ziopatch to assess Afib burden and adequate rate control.   Will arrange outpatient cardiology follow-up in 1 month.     Lisa Velasquez, CNP  Pager:  [phone]  (7am - 5pm, M-F)      Physical Exam   Temp: 97  F (36.1  C) Temp src: Temporal BP: 99/69 Pulse: 90   Resp: 16 SpO2: 100 % O2 Device: Nasal cannula Oxygen Delivery: 2 LPM  Vitals:    10/07/24 1936 10/08/24 0604   Weight: 64.4 kg (142 lb) 51.6 kg (113 lb 12.1 oz)       Constitutional:   NAD   Skin:   Warm and dry   Head:   Nontraumatic   Neck:   no JVD   Lungs:   normal   Cardiovascular:   irregularly irregular rhythm   Abdomen:   Benign   Extremities and Back:   No edema   Neurological:   Grossly nonfocal       Medications   Current Facility-Administered Medications   Medication Dose Route Frequency Provider Last Rate Last Admin    dextrose 5% and 0.9% NaCl infusion   Intravenous Continuous Kathryn Stafford MD 75 mL/hr at 10/08/24 0900 New Bag at 10/08/24 0900     Current Facility-Administered Medications   Medication Dose Route Frequency Provider Last Rate Last Admin     acetaminophen (TYLENOL) tablet 975 mg  975 mg Oral Q8H Padmini Joyce PA-C   975 mg at 10/09/24 0830    clindamycin (CLEOCIN) 600 mg in 50 mL D5W intermittent infusion  600 mg Intravenous Q6H Padmini Joyce PA-C 100 mL/hr at 10/09/24 1210 600 mg at 10/09/24 1210    enoxaparin ANTICOAGULANT (LOVENOX) injection 40 mg  40 mg Subcutaneous Q24H Karma Emery MD   40 mg at 10/09/24 1016    [Held by provider] metoprolol tartrate (LOPRESSOR) half-tab 12.5 mg  12.5 mg Oral BID Lisa Velasquez CNP        piperacillin-tazobactam (ZOSYN) 2.25 g vial to attach to  ml bag  2.25 g Intravenous Q6H Kathryn Stafford MD   2.25 g at 10/09/24 0910    polyethylene glycol (MIRALAX) Packet 17 g  17 g Oral Daily Padmini Joyce PA-C   17 g at 10/09/24 0830    senna-docusate (SENOKOT-S/PERICOLACE) 8.6-50 MG per tablet 1 tablet  1 tablet Oral BID Padmini Joyce PA-C   1 tablet at 10/09/24 0949    sodium chloride (PF) 0.9% PF flush 3 mL  3 mL Intracatheter Q8H Padmini Joyce PA-C   3 mL at 10/09/24 0557       Data     Most Recent 3 CBC's:  Recent Labs   Lab Test 10/09/24  0539 10/08/24  2333 10/08/24  0630   WBC 8.0 8.2 7.7   HGB 11.2* 11.2* 11.9   MCV 96 97 95   PLT 60* 67* 85*     Most Recent 3 BMP's:  Recent Labs   Lab Test 10/09/24  0643 10/09/24  0539 10/08/24  2333 10/08/24  1639 10/08/24  0630   NA  --  139 141  --  139   POTASSIUM  --  4.8 4.6 4.7 4.6   CHLORIDE  --  109* 110*  --  108*   CO2  --  22 20*  --  23   BUN  --  33.2* 33.2*  --  41.6*   CR  --  0.86 0.86  --  1.02*   ANIONGAP  --  8 11  --  8   SHORTY  --  8.2* 8.2*  --  8.1*   * 118* 124*  --  142*     Most Recent 3 Troponin's:  Recent Labs   Lab Test 09/19/21  1025   TROPONIN <0.015         Medical Decision Making       30 MINUTES SPENT BY ME on the date of service doing chart review, history, exam, documentation & further activities per the note.      Clinically Significant Risk Factors        # Hyperkalemia: Highest K = 5.4 mmol/L in  last 2 days, will monitor as appropriate   # Hyperchloremia: Highest Cl = 110 mmol/L in last 2 days, will monitor as appropriate      # Hypocalcemia: Lowest Ca = 8.1 mg/dL in last 2 days, will monitor and replace as appropriate       # Thrombocytopenia: Lowest platelets = 60 in last 2 days, will monitor for bleeding   # Hypertension: Noted on problem list               # Financial/Environmental Concerns: none

## 2024-10-10 ENCOUNTER — APPOINTMENT (OUTPATIENT)
Dept: SPEECH THERAPY | Facility: CLINIC | Age: 89
DRG: 521 | End: 2024-10-10
Attending: HOSPITALIST
Payer: MEDICARE

## 2024-10-10 ENCOUNTER — APPOINTMENT (OUTPATIENT)
Dept: PHYSICAL THERAPY | Facility: CLINIC | Age: 89
DRG: 521 | End: 2024-10-10
Payer: MEDICARE

## 2024-10-10 LAB
ANION GAP SERPL CALCULATED.3IONS-SCNC: 11 MMOL/L (ref 7–15)
BUN SERPL-MCNC: 38.7 MG/DL (ref 8–23)
CALCIUM SERPL-MCNC: 8.4 MG/DL (ref 8.8–10.4)
CHLORIDE SERPL-SCNC: 110 MMOL/L (ref 98–107)
CREAT SERPL-MCNC: 1.16 MG/DL (ref 0.51–0.95)
EGFRCR SERPLBLD CKD-EPI 2021: 44 ML/MIN/1.73M2
ERYTHROCYTE [DISTWIDTH] IN BLOOD BY AUTOMATED COUNT: 14.6 % (ref 10–15)
GLUCOSE BLDC GLUCOMTR-MCNC: 100 MG/DL (ref 70–99)
GLUCOSE BLDC GLUCOMTR-MCNC: 97 MG/DL (ref 70–99)
GLUCOSE SERPL-MCNC: 111 MG/DL (ref 70–99)
HCO3 SERPL-SCNC: 19 MMOL/L (ref 22–29)
HCT VFR BLD AUTO: 34 % (ref 35–47)
HGB BLD-MCNC: 10.8 G/DL (ref 11.7–15.7)
MAGNESIUM SERPL-MCNC: 2.1 MG/DL (ref 1.7–2.3)
MCH RBC QN AUTO: 30.7 PG (ref 26.5–33)
MCHC RBC AUTO-ENTMCNC: 31.8 G/DL (ref 31.5–36.5)
MCV RBC AUTO: 97 FL (ref 78–100)
PLATELET # BLD AUTO: 85 10E3/UL (ref 150–450)
POTASSIUM SERPL-SCNC: 4.6 MMOL/L (ref 3.4–5.3)
RBC # BLD AUTO: 3.52 10E6/UL (ref 3.8–5.2)
SODIUM SERPL-SCNC: 140 MMOL/L (ref 135–145)
WBC # BLD AUTO: 7.7 10E3/UL (ref 4–11)

## 2024-10-10 PROCEDURE — 250N000013 HC RX MED GY IP 250 OP 250 PS 637: Performed by: STUDENT IN AN ORGANIZED HEALTH CARE EDUCATION/TRAINING PROGRAM

## 2024-10-10 PROCEDURE — 250N000013 HC RX MED GY IP 250 OP 250 PS 637

## 2024-10-10 PROCEDURE — 99232 SBSQ HOSP IP/OBS MODERATE 35: CPT | Performed by: HOSPITALIST

## 2024-10-10 PROCEDURE — 250N000013 HC RX MED GY IP 250 OP 250 PS 637: Performed by: NURSE PRACTITIONER

## 2024-10-10 PROCEDURE — 250N000011 HC RX IP 250 OP 636: Performed by: INTERNAL MEDICINE

## 2024-10-10 PROCEDURE — 85027 COMPLETE CBC AUTOMATED: CPT | Performed by: INTERNAL MEDICINE

## 2024-10-10 PROCEDURE — 97530 THERAPEUTIC ACTIVITIES: CPT | Mod: GP

## 2024-10-10 PROCEDURE — 250N000013 HC RX MED GY IP 250 OP 250 PS 637: Performed by: HOSPITALIST

## 2024-10-10 PROCEDURE — 92526 ORAL FUNCTION THERAPY: CPT | Mod: GN

## 2024-10-10 PROCEDURE — 36415 COLL VENOUS BLD VENIPUNCTURE: CPT | Performed by: INTERNAL MEDICINE

## 2024-10-10 PROCEDURE — 92610 EVALUATE SWALLOWING FUNCTION: CPT | Mod: GN

## 2024-10-10 PROCEDURE — 80048 BASIC METABOLIC PNL TOTAL CA: CPT | Performed by: INTERNAL MEDICINE

## 2024-10-10 PROCEDURE — 83735 ASSAY OF MAGNESIUM: CPT | Performed by: HOSPITALIST

## 2024-10-10 PROCEDURE — 999N000111 HC STATISTIC OT IP EVAL DEFER

## 2024-10-10 PROCEDURE — 97110 THERAPEUTIC EXERCISES: CPT | Mod: GP

## 2024-10-10 PROCEDURE — 120N000001 HC R&B MED SURG/OB

## 2024-10-10 RX ORDER — POLYETHYLENE GLYCOL 3350 17 G/17G
17 POWDER, FOR SOLUTION ORAL DAILY
DISCHARGE
Start: 2024-10-10

## 2024-10-10 RX ORDER — AMOXICILLIN AND CLAVULANATE POTASSIUM 500; 125 MG/1; MG/1
1 TABLET, FILM COATED ORAL EVERY 12 HOURS SCHEDULED
Status: COMPLETED | OUTPATIENT
Start: 2024-10-10 | End: 2024-10-12

## 2024-10-10 RX ORDER — AMOXICILLIN 250 MG
1 CAPSULE ORAL 2 TIMES DAILY PRN
DISCHARGE
Start: 2024-10-10

## 2024-10-10 RX ORDER — OXYCODONE HYDROCHLORIDE 5 MG/1
5-10 TABLET ORAL EVERY 4 HOURS PRN
Qty: 15 TABLET | Refills: 0 | Status: SHIPPED | OUTPATIENT
Start: 2024-10-10 | End: 2024-10-15

## 2024-10-10 RX ORDER — METHOCARBAMOL 500 MG/1
250 TABLET, FILM COATED ORAL 4 TIMES DAILY PRN
DISCHARGE
Start: 2024-10-10 | End: 2024-10-15

## 2024-10-10 RX ORDER — ACETAMINOPHEN 325 MG/1
975 TABLET ORAL EVERY 8 HOURS PRN
DISCHARGE
Start: 2024-10-10

## 2024-10-10 RX ORDER — AMOXICILLIN 250 MG
2 CAPSULE ORAL 2 TIMES DAILY
Status: DISCONTINUED | OUTPATIENT
Start: 2024-10-10 | End: 2024-10-15 | Stop reason: HOSPADM

## 2024-10-10 RX ADMIN — APIXABAN 2.5 MG: 2.5 TABLET, FILM COATED ORAL at 09:22

## 2024-10-10 RX ADMIN — ACETAMINOPHEN 975 MG: 325 TABLET ORAL at 16:26

## 2024-10-10 RX ADMIN — AMOXICILLIN AND CLAVULANATE POTASSIUM 1 TABLET: 500; 125 TABLET, FILM COATED ORAL at 18:55

## 2024-10-10 RX ADMIN — SENNOSIDES AND DOCUSATE SODIUM 2 TABLET: 50; 8.6 TABLET ORAL at 20:01

## 2024-10-10 RX ADMIN — ACETAMINOPHEN 975 MG: 325 TABLET ORAL at 23:35

## 2024-10-10 RX ADMIN — PIPERACILLIN AND TAZOBACTAM 2.25 G: 2; .25 INJECTION, POWDER, FOR SOLUTION INTRAVENOUS at 09:21

## 2024-10-10 RX ADMIN — APIXABAN 2.5 MG: 2.5 TABLET, FILM COATED ORAL at 20:01

## 2024-10-10 RX ADMIN — SENNOSIDES AND DOCUSATE SODIUM 1 TABLET: 8.6; 5 TABLET ORAL at 09:21

## 2024-10-10 RX ADMIN — PIPERACILLIN AND TAZOBACTAM 2.25 G: 2; .25 INJECTION, POWDER, FOR SOLUTION INTRAVENOUS at 03:44

## 2024-10-10 RX ADMIN — POLYETHYLENE GLYCOL 3350 17 G: 17 POWDER, FOR SOLUTION ORAL at 09:20

## 2024-10-10 RX ADMIN — ACETAMINOPHEN 975 MG: 325 TABLET ORAL at 06:47

## 2024-10-10 RX ADMIN — METOPROLOL TARTRATE 12.5 MG: 25 TABLET, FILM COATED ORAL at 20:01

## 2024-10-10 ASSESSMENT — ACTIVITIES OF DAILY LIVING (ADL)
ADLS_ACUITY_SCORE: 52
ADLS_ACUITY_SCORE: 54
ADLS_ACUITY_SCORE: 52
ADLS_ACUITY_SCORE: 54
ADLS_ACUITY_SCORE: 50
ADLS_ACUITY_SCORE: 54
ADLS_ACUITY_SCORE: 48
ADLS_ACUITY_SCORE: 54
ADLS_ACUITY_SCORE: 48
ADLS_ACUITY_SCORE: 52
ADLS_ACUITY_SCORE: 57
ADLS_ACUITY_SCORE: 52
ADLS_ACUITY_SCORE: 54
ADLS_ACUITY_SCORE: 52
ADLS_ACUITY_SCORE: 57
ADLS_ACUITY_SCORE: 52
ADLS_ACUITY_SCORE: 57
ADLS_ACUITY_SCORE: 52
ADLS_ACUITY_SCORE: 52
ADLS_ACUITY_SCORE: 54
ADLS_ACUITY_SCORE: 50
ADLS_ACUITY_SCORE: 54
ADLS_ACUITY_SCORE: 52

## 2024-10-10 NOTE — PROGRESS NOTES
Bedside Swallow Evaluation      10/10/24 0800   Appointment Info   Signing Clinician's Name / Credentials (SLP) Angie Ceballos MA, CCC-SLP   General Information   Onset of Illness/Injury or Date of Surgery 10/06/24   Referring Physician Shaylee Novoa MD   Patient/Family Therapy Goal Statement (SLP) Did not state   Pertinent History of Current Problem Per MD note: Benita Hubbard is a 89 year old female with h/o  HTN, non ischemic Cardiomyopathy, CAD, neuropathy, history of UTI  was found on the floor in her independent apartment for an unknown period of time (found on welfare check) and was brought to the ER by EMS.  She was found to have left femoral neck fracture and admitted on 10/6/2024 for further management.   General Observations Sitting in chair for breakfast   Type of Evaluation   Type of Evaluation Swallow Evaluation   Oral Motor   Oral Musculature generally intact   Dentition (Oral Motor)   Dentition (Oral Motor) significant number of missing teeth;dentition impacts chewing ability   Facial Symmetry (Oral Motor)   Facial Symmetry (Oral Motor) WNL   Lip Function (Oral Motor)   Lip Range of Motion (Oral Motor) WNL   Tongue Function (Oral Motor)   Tongue ROM (Oral Motor) WNL   Jaw Function (Oral Motor)   Jaw Function (Oral Motor) not tested   Cough/Swallow/Gag Reflex (Oral Motor)   Soft Palate/Velum (Oral Motor) WNL   Volitional Throat Clear/Cough (Oral Motor) impaired;reduced strength   Volitional Swallow (Oral Motor) mildly delayed   Vocal Quality/Secretion Management (Oral Motor)   Vocal Quality (Oral Motor) WFL   Secretion Management (Oral Motor) WNL   General Swallowing Observations   Past History of Dysphagia No documented hx of dysphagia per pt or son's report. Son reports that they have been offering softer foods as they are easier for her.   Respiratory Support room air   Current Diet/Method of Nutritional Intake (General Swallowing Observations, NIS) regular diet;thin liquids (level 0)    Swallowing Evaluation Clinical swallow evaluation   Clinical Swallow Evaluation   Feeding Assistance set up only required   Additional evaluation(s) completed today No   Clinical Swallow Evaluation Textures Trialed thin liquids;pureed;solid foods   Clinical Swallow Eval: Thin Liquid Texture Trial   Mode of Presentation, Thin Liquids cup;straw;self-fed   Volume of Liquid or Food Presented 2oz   Oral Phase of Swallow WFL   Pharyngeal Phase of Swallow impaired;coughing/choking   Diagnostic Statement x1 cough when drinking from a straw. Additional single straw sips without coughing. Cup drinking without s/sx of aspiration   Clinical Swallow Evaluation: Puree Solid Texture Trial   Mode of Presentation, Puree spoon;self-fed   Volume of Puree Presented 4oz   Oral Phase, Puree WFL   Pharyngeal Phase, Puree intact   Clinical Swallow Evaluation: Solid Food Texture Trial   Mode of Presentation self-fed   Volume Presented eggs, toast   Oral Phase impaired mastication;residue in oral cavity   Oral Residue mid posterior tongue   Pharyngeal Phase intact   Diagnostic Statement Slow mastication, mild-mod oral residue that cleared with liquid rinse   Esophageal Phase of Swallow   Patient reports or presents with symptoms of esophageal dysphagia No   Swallowing Recommendations   Diet Consistency Recommendations regular diet;thin liquids (level 0)   Supervision Level for Intake patient independent   Mode of Delivery Recommendations bolus size, small;slow rate of intake   Postural Recommendations none   Swallowing Maneuver Recommendations alternate food and liquid intake   Recommended Feeding/Eating Techniques (Swallow Eval) set-up and prepare tray;provide 6 smaller meals throughout day;maintain upright posture during/after eating for 30 minutes   Medication Administration Recommendations, Swallowing (SLP) Small whole as tolerated, otherwise crush in purees   Comment, Swallowing Recommendations Patient tolerated thin liquids, purees,  and solid food. Swallow function is slow due to age, min-mod oral residue, slow mastication   Clinical Impression   Criteria for Skilled Therapeutic Interventions Met (SLP Eval) Evaluation only;No problems identified which require skilled intervention   SLP Diagnosis mild oral-pharyngeal dysphagia   Risks & Benefits of therapy have been explained evaluation/treatment results reviewed;care plan/treatment goals reviewed;risks/benefits reviewed;current/potential barriers reviewed;participants voiced agreement with care plan;participants included;patient;son   Clinical Impression Comments Patient presents with mild oral-pharyngeal dysphagia secondary to generalized, age-related weakness. Pt/family is able to modify regular diet textures, note coughing with straws and thin liquids- but otherwise tolerates. Recommend regular diet with thin liquids. General safe swallow precautions: softer foods, limit straws, sitting upright, set-up meal tray, smaller/more frequent meals, small bites/sips, and alternate consistencies.   SLP Total Evaluation Time   Eval: oral/pharyngeal swallow function, clinical swallow Minutes (77699) 15   SLP Goals   Therapy Frequency (SLP Eval) one time eval and treatment only   SLP Predicted Duration/Target Date for Goal Attainment 10/10/24   Interventions   Interventions Quick Adds Swallowing Dysfunction   Swallowing Dysfunction &/or Oral Function for Feeding   Treatment of Swallowing Dysfunction &/or Oral Function for Feeding Minutes (86214) 10   Symptoms Noted During/After Treatment None   Treatment Detail/Skilled Intervention Provided extensive safe swallow strategies and guidlines to son at bedside. He demonstrated understanding and appreciative of recommendations   SLP Discharge Planning   SLP Plan no further IP SLP needs   SLP Discharge Recommendation assisted living   SLP Rationale for DC Rec No further SLP needs at discharge   SLP Brief overview of current status  Clinical swallow evaluation  completed. Recommend regular diet with thin liquids. General safe swallow precautions: softer foods, limit straws, sitting upright, set-up meal tray, smaller/more frequent meals, small bites/sips, and alternate consistencies.   Total Session Time   Total Session Time (sum of timed and untimed services) 25

## 2024-10-10 NOTE — PLAN OF CARE
Goal Outcome Evaluation:  Neuro: A&O to self and place, forgetful seems more confused in the afternoon   Tele/Cardiac: SR w/ BBB  Activity: Ax2 GB and miladys steady  Pain: Denies pain, on schedule tylenol  Drips/IV: PIV s/l with intermittent abx on zosyn  GI/: continent of bowel has a chronic lucero   Skin: abrasion to L forearm and L sin/knee, scattered bruising and dressing to L hip from a TONY  Diet: Regular  Test/Procedures: AM labs, working with PT  Plan: Will need TCU placement referrals sent

## 2024-10-10 NOTE — PLAN OF CARE
Goal Outcome Evaluation:      Plan of Care Reviewed With: patient, child    Overall Patient Progress: improvingOverall Patient Progress: improving    Outcome Evaluation: [facility]    Patient vital signs are at baseline: Yes P=tachy  Patient able to ambulate as they were prior to admission or with assist devices provided by therapies during their stay:  No,  Reason:  Ax2 w/ SaraStedy for Transfers, baseline ambulatory  Patient MUST void prior to discharge:  Yes  Torres removed 12:30 per MD, Due to Void  Patient able to tolerate oral intake:  Yes  No Straw, Sit upright while eating  Pain has adequate pain control using Oral analgesics:  Yes  Does patient have an identified :  No,  Reason:  Plan to Transfer to [facility] 10/11/2024  Has goal D/C date and time been discussed with patient:  Yes    Dx:Left TONY, MAGNO, UTI  POD#2    A&Ox1-2 to Person and Place only.   CMS Intact.   VSS on RA.   Up with Ax2 GB and SaraStedy.   Due to Void, Torres removed this shift per MD.   Left PIV SL, hand is slightly swollen.  Regular Diet, No Straw, Sit Upright while eating.  Pain controlled with Tylenol.

## 2024-10-10 NOTE — PROGRESS NOTES
[facility]    Medicine Progress Note - Hospitalist Service    Date of Admission:  10/6/2024    Interval History   Encounter with son Servando present.  Patient denies pain, eating, signs states patient has no bowel movement since admission.  Denies abdominal pain, nausea, emesis.  Now in sinus.  Rate 80.      Assessment & Plan   Benita Hubbard is a 89 year old female with h/o  HTN, non ischemic Cardiomyopathy, CAD, neuropathy, history of UTI  was found on the floor in her independent apartment for an unknown period of time (found on welfare check) and was brought to the ER by EMS.  She was found to have left femoral neck fracture and admitted on 10/6/2024 for further management.      Following admission of 10/6 she had 2 subsequent RRT's.  First RRT early a.m on 10/7 for chest pain and shortness of breath.  Extensive evaluation as outlined below.  Noted to have refractory lactic acidosis throughout the day on 10/7.  Elevated troponin with cardiomyopathy seen by cardiology.  Hyperkalemia which is subsequently improved.  Treated for potential of UTI with antibiotics started.  Given significant amount of fluids.  Labs improved with resolution of lactic acidosis on 10/8 labs.     Lactic acidosis: (resolved)   Multifactorial potential contributing factors   DDX: cardiac (elevated trop and reduced EF) , PE evaluation (negative), infection (Started on rocephin/zosyn), fracture/fall and on floor for unknown time span with potential few days  -Right femoral neck fracture secondary to fall, status post left TONY 10/8/2024  - Patient with elevated lactic acid of 3.4 on admit 10/6   - Given fluids with reduction in lactate to 2.3 on 10/7 initially and then rebound increase 3.5>>4>>3.5   - D5NS at 100 since admit   - Differential diagnosis considered   - Elevated troponins with cardiology consult >> no intervention probable supply demand per cardiology discussion but noted to have cardiomyopathy  -RV  dilation on echo leading to CT PE exclusion (neg PE study)   -Potential underlying UTI placed on ceftriaxone.= then zosyn  -Gallbladder wall thickening based on CT abdomen with right upper quadrant ultrasound negative and normal LFT  -Mild rhabdomyolysis treated with fluids  - Multiple reassessments throughout the day regarding lactic acidosis and follow up.   - 10/8 Lactate normalized now 1.2 >> Exact etiology not confirmed but treated for multiple contributing factors and better.   - LACTATE now normal on 10/8 >> left TONY per Ortho 10/8/2024.  -10/9/2024 denies pain.  Remains in A-fib, rate controlled.  See Cardiology note, started on apixaban, Lovenox DVT PX PE per Ortho discontinued.  Will confirm with Ortho no aspirin on discharge.  Started on PT, patient lives in [facility].  -10/10/2024.  Denies pain.  Transition to sinus, rate 80.  Soft blood pressure precludes use of B2.  Started on apixaban for PAF and DVT PXP status post TONY.    Hyperkalemia: (resolved)   - At baseline potassium low 5 range and on ACE  - stopped ACE   - Lucero to alleviate urinary emptying   - monitor potassium   - Peak potassium 5.7 >> given fluids with lucero placed   - 10/8 potassium normal 4.6, 10/9/2024 at 4.8, recheck BMP in a.m.    MAGNO mild  10/10/2024 mild bump in creatinine 1.16, before surgery 0.86.  Suspect related to contrast dye.  Patient urinating.  On antibiotics for UTI.  DC Lucero, voiding trial.  Encourage p.o. intake.  BMP in AM.    H/o nonischemic cardiomyopathy  Elevated troponin (type 2 MI secondary to supply/demand) tachycardia, acidosis,   Cardiology consult   10/8 new onset of afib, 10/9/2024 rate controlled.  - LVEF was 40-45% in 2014.  Coronary angiogram showed normal coronaries.  LVEF subsequently normalized on ECHO 5/2014   - EKG with RBBB  - Troponin up [id number]  - Cardiology consult with elevated lactate and troponin   - 10/7 ECHO with EF 40-45% Flattened septum  "with RV pressure/volume overload. RV dilated. Moderate TR, mild AL  - with RV dilation PE excluded with CT   - Discussions with cardiology and no interventions planned. >> still most likely from stress demand    - 10/8 no further cardiac interventions planned monitor.   - 10/8 afib early this am. (Rate controlled) no interventions planned.  Left TONY 10/8/2024  -10/9/2024, Cardiology started on apixaban, DC Lovenox DVT PX per Ortho.      Suspected fall, unclear etiology of fall  Left femoral neck fracture, acute, displaced  CT abdomen/pelvis displaced and angulated left femoral neck with nondisplaced fracture of the left pubic bone and left inferior and superior pubic rami.   Left hip hemiarthroplasty placed.  10/8/2024  - Unclear etiology of fall.  She was found on floor.    - Patient does not remember when she fell, and also the mechanism.   -  A bruise in her chin was noted 3 days ago by son, patient did not remember falling then as well.    - Uses walker mostly for mobility.  - Multiple imaging studies including left shoulder x-ray, chest x-ray, pelvis and hip x-ray,   - CT head and CT C-spine completed, noted left femoral neck fracture with displacement.    - 10/7 ortho consult   - 10/8 ortho surgery planned. Aware of pelvic fractures   - 10/8 left TONY 10/8/2024  -Lovenox DVT PX PE transition to apixaban in view of A-fib.  See above.  -PT recommends TCU.  -10/10/2024, see Ortho note. \"Continue Eliquis 2.5 mg BID for atrial fibrillation and DVT prophylaxis.              Hgb stable.              Mobilize with PT/OT.               WBAT LLE with use of assistive device.                No left hip adduction past midline for 6-8 weeks postop.              Continue current pain regimen.              Dressings: Keep intact. Change if >60% saturated or peeling off.               Follow-up: 2 weeks post-op with Dr. Julian team\"      Acute hypoxic respiratory failure  CT no PE, effusions,   Potential Right lung base " pneumonia   Few small pulmonary nodules  - Difficult to evaluate oxygen levels with finger probe, ear probe and head probe.   - When able to get what appears to be good wave forms in mid 90%  - face mask as mouth breather  - CT with no obvious PE, small effusions Left greater than right   - on ceftriaxone for UTI vs PNA initially now on zosyn, will Rx 5 days total. Follow-up US.  - ECHO with 40-45% with holding lasix until after surgery with creatinine   -UCx Aerococcus urinae and sanguinicola; US pending  -Due to cephalosporin allergy transition to Augmentin 500 mg twice daily for 5 more doses total 5-day antibiotic course.  DC Zosyn.        Rhabdomyolysis  CK mildly elevated at  748.  Suspect related to fall.  -IV hydration and follow  - Repeat 393   -      UTI  Urine culture sent   - UA positive with catheter  - CT with mild diffuse bladder wall thickening and enhancement ? Cystitis.   - started on ceftriaxone. 10/7 >> prior Urine with aerococcus so changed to zosyn.   - 10/7 changed to zosyn >> await cultures and taper as able.   - 10/8 blood cultures NGTD  -Eating well, DC IV fluids especially in view of IV shortage.  -Due to cephalosporin allergy transition to Augmentin 500 mg twice daily for 5 more doses total 5-day antibiotic course.  DC Zosyn.    Diffuse Gallbladder wall thickening:   - on CT imaging abdomen   - Liver profile negative.   - 10/7 RUQ ultrasound with no cholecystitis      Thrombocytopenia  Mild thrombocytopenia, platelet count 112.  -Monitor  -Check iron panel, B12 and folate level.  -10/9/2024 platelet count 60,000, CBC in AM platelets 85K.  No active signs of bleeding..    Compression fractures.   T5 and L3 indeterminate on CT chest this admit (prior imaging suggest prior on imaging)   - 7/2019 xray of lumbar spine with mild L3 compression fracture   - 9/2021 CT chest with age indeterminate T4, mild T5 and T6   - 10/8 attempted to review with radiology but the films are not available  here. (Can readdress post surgery>> Patient denies any back pain and imaging appears to suggest noted in past)       Cognitive impairment  Acute encephalopathy   Son reports patient is forgetful and has short-term memory impairment.    - No formal diagnosis of dementia  - CT head no acute injury   - baseline short term memory problems now with lyrica, dilaudid and oxycodone on nights >> Stopped opioids with rectal tylenol.   - Would not resume lyrica after surgery (this was stopped in clinic a few weeks ago)     GOALS of care:   Prior hospitalist multiple discussions with sons throughout the day. Patient with instability, lactic acidosis, hyperkalemia, Lethargy, fall.   Patient was able to confirm herself she was DNR/DNI. Family in agreement with supportive treatment with fluids, PE, Cardiac evaluation but not planning to increase level of ICU, intubation, lines.  Patient is full code.  10/8 updated family with labs and plans for surgery          Diet: Advance Diet as Tolerated: Regular Diet Adult    DVT Prophylaxis: Pneumatic Compression Devices  Torres Catheter: PRESENT, indication: Acute retention or obstruction  Lines: None     Cardiac Monitoring: None  Code Status: Full Code      Clinically Significant Risk Factors          # Hyperchloremia: Highest Cl = 110 mmol/L in last 2 days, will monitor as appropriate            # Thrombocytopenia: Lowest platelets = 60 in last 2 days, will monitor for bleeding   # Hypertension: Noted on problem list               # Financial/Environmental Concerns: none         Disposition Plan     Medically Ready for Discharge: Anticipated in 2-4 Days     Shaylee Novoa MD  Hospitalist Service  [facility]  Securely message with [employer] (more info)  Text page via [employer] Paging/Directory     Total time 50 minutes for today 10/10/2024 :  time consisted of the following, examination of patient, review of records including labs, imaging results, medications,  interdisciplinary notes and completing documentation and orders.  Care Management included counseling/discussion with Patient and Son regarding current condition including status post TONY, PAF, MAGNO and Coordination of Care time with Nursing  and Specialists, Ortho and Cardiology regarding care plan, management and surveillance.   ______________________________________________________________________  Physical Exam   Vital Signs: Temp: 97.5  F (36.4  C) Temp src: Oral BP: 107/70 Pulse: 94   Resp: 16 SpO2: 94 % O2 Device: None (Room air)    Weight: 122 lbs 5.68 oz    General Appearance: NAD, alert, calm, cooperative  Respiratory: Respirations nonlabored room air  Cardiovascular: Regular, sinus, rate of 80  GI: + Soft nontender  MSK left hip ROM not tested, ambulation not test  Neuro.  Gross motor tested, nonfocal,   Psych orientation to person and place, affect calm.      Data     I have personally reviewed the following data over the past 24 hrs:    7.7  \   10.8 (L)   / 85 (L)     140 110 (H) 38.7 (H) /  100 (H)   4.6 19 (L) 1.16 (H) \       Imaging results reviewed over the past 24 hrs:

## 2024-10-10 NOTE — PLAN OF CARE
Diagnosis: L Hip Hemiarthroplasty  POD#:  2  Mental Status: A&O x2 (person and situation)  Activity:  A2, gait belt, SaraStedy, up in chair  Diet: regular  Pain: controlled w/ scheduled Tylenol  Tabares/Voiding: Tabares DC'd, due to void  Tele: DAVID Ruiz   02/LDA:room air, PIV SL  D/C Date: TBD, pending TCU placement  Other Info:

## 2024-10-10 NOTE — PROVIDER NOTIFICATION
MD Notification    Notified Person: MD    Notified Person Name: Dr. Rodgers    Notification Date/Time:  10/10/24  9430    Notification Interaction: amcom page     Purpose of Notification:  Tele: A Flutter, RVR    Orders Received: none, Metoprolol resumed    Comments:

## 2024-10-10 NOTE — PROGRESS NOTES
Care Management Follow Up    Length of Stay (days): 4    Expected Discharge Date: 10/11/2024     Concerns to be Addressed: discharge planning, care coordination/care conferences, all concerns addressed in this encounter     Patient plan of care discussed at interdisciplinary rounds: Yes    Anticipated Discharge Disposition: Transitional Care  Disposition Comments: TCU           Anticipated Discharge Services: None  Anticipated Discharge DME: None    Patient/family educated on Medicare website which has current facility and service quality ratings: yes  Education Provided on the Discharge Plan: Yes  Patient/Family in Agreement with the Plan: yes    Referrals Placed by CM/SW: Post Acute Facilities  Private pay costs discussed: Not applicable    Discussed  Partnership in Safe Discharge Planning  document with patient/family: No     Handoff Completed: No, handoff not indicated or clinically appropriate    Additional Information:  Voicemail received from Papito (Son) asking for an update on the plan. Writer called back and left a VM confirming that [facility] can accept tomorrow into a private room if medically ready. Also explained that [facility] did not have a bed until late next week. Writer also included that writer will no longer be following as patient transferred of her floor. However, left the number of floor  now following patient.     Next Steps: SW will follow    EDMUND Flores, LICSW  Social Work- Inpatient Care Coordination  [facility]

## 2024-10-10 NOTE — PROGRESS NOTES
Care Management Follow Up    Length of Stay (days): 4    Expected Discharge Date: 10/11/2024     Concerns to be Addressed: discharge planning, care coordination/care conferences, all concerns addressed in this encounter     Patient plan of care discussed at interdisciplinary rounds: Yes    Anticipated Discharge Disposition: Transitional Care  Disposition Comments: TCU           Anticipated Discharge Services: None  Anticipated Discharge DME: None    Patient/family educated on Medicare website which has current facility and service quality ratings: yes  Education Provided on the Discharge Plan: Yes  Patient/Family in Agreement with the Plan: yes    Referrals Placed by CM/SW: Post Acute Facilities  Private pay costs discussed: Not applicable    Discussed  Partnership in Safe Discharge Planning  document with patient/family: No     Handoff Completed: No, handoff not indicated or clinically appropriate    Additional Information:  Writer completed PAS for anticipated discharge and placed on chart.    PAS-RR    D: Per DHS regulation, DEVONTE completed and submitted PAS-RR to [place] [employer] Direct Connect via the Senior Link[employer] Line.  PAS-RR confirmation # is : [id number]    P: Further questions may be directed to Senior [employer] Line at #[phone], option #4 for PAS-RR staff.     Next Steps: Once medically cleared set up transportation, orders    Addendum 0938: Call placed to [facility] admissions and left a voicemail for [facility]. Asking for a return call to determine if a bed is still available today and to confirm if it is a shared or private. Writer also asked if they would be able to hold the bed another day if patient was not ready. Waiting to hear back.     Call received back from [facility] in admissions. They have a private room for patient (patient preference) and they can either take patient today vs. Tomorrow. Writer paged Dr. Novoa to determine when patient will be medically ready. Message received back that patient may  be ready tomorrow. Voicemail left with [facility] in [facility] confirming private room for tomorrow pending medical clearance.     EDMUND Flores, [facility]  Social Work- Inpatient Care Coordination  [facility]

## 2024-10-10 NOTE — PROGRESS NOTES
Orthopedic Surgery  Benita Hubbard  10/10/2024     Admit Date:  10/6/2024    POD: 2 Days Post-Op   Procedure(s):  HIP HEMIARTHROPLASTY LEFT  Nondisplaced fracture of the left pubic bone and left inferior and superior pubic rami (nonop)    Patient resting comfortably in chair finishing breakfast. Son, Servando, at bedside.  Denies pain to the left hip at rest. No significant increase in pain with movement.   Denies numbness and tingling to the LLE.  Tolerating oral intake.    Denies nausea or vomiting.  Denies chest pain or shortness of breath.  Hypotensive this morning, otherwise afebrile and vitally stable.  NAEO.    Temp:  [97.6  F (36.4  C)-98.5  F (36.9  C)] 98.4  F (36.9  C)  Pulse:  [80-91] 87  Resp:  [16-20] 16  BP: ()/(54-73) 93/58  Cuff Mean (mmHg):  [75] 75  SpO2:  [90 %-96 %] 90 %    Alert and oriented. NAD. Non-toxic appearing. Breathing comfortably ORA.  Left hip gauze/tegaderm dressing is clean, dry, and intact.   No erythema of the surrounding skin, but minimal surrounding ecchymosis.  Thigh is soft and compressible without tenderness.  Bilateral calves are soft, non-tender.  Left lower extremity is NVI.  Sensation intact bilateral lower extremities.  Patient able to resist dorsi and plantar flexion bilaterally.  Full AROM of toes.  2+Dp pulse    Labs:  Recent Labs   Lab Test 10/10/24  0555 10/09/24  0539 10/08/24  2333   WBC 7.7 8.0 8.2   HGB 10.8* 11.2* 11.2*   PLT 85* 60* 67*     1. Plan:   Continue Eliquis 2.5 mg BID for atrial fibrillation and DVT prophylaxis.   Hgb stable.   Mobilize with PT/OT.    WBAT LLE with use of assistive device.     No left hip adduction past midline for 6-8 weeks postop.   Continue current pain regimen.   Dressings: Keep intact. Change if >60% saturated or peeling off.    Follow-up: 2 weeks post-op with Dr. Julian team    2. Disposition:   Anticipate d/c to TCU  when medically cleared and progressing in PT. Ortho stable.    Chela Pink PA-C  [facility]  Orthopedics

## 2024-10-10 NOTE — PLAN OF CARE
Occupational Therapy: Orders received. Chart reviewed and discussed with care team, including IP PT. Chart indicates plans for TCU as early as tomorrow. All therapy needs are being met with IP PT. Will defer OT to next level of care. Will complete orders.

## 2024-10-11 ENCOUNTER — APPOINTMENT (OUTPATIENT)
Dept: PHYSICAL THERAPY | Facility: CLINIC | Age: 89
DRG: 521 | End: 2024-10-11
Payer: MEDICARE

## 2024-10-11 LAB
ANION GAP SERPL CALCULATED.3IONS-SCNC: 12 MMOL/L (ref 7–15)
BUN SERPL-MCNC: 42.1 MG/DL (ref 8–23)
CALCIUM SERPL-MCNC: 8.7 MG/DL (ref 8.8–10.4)
CHLORIDE SERPL-SCNC: 107 MMOL/L (ref 98–107)
CREAT SERPL-MCNC: 1.04 MG/DL (ref 0.51–0.95)
EGFRCR SERPLBLD CKD-EPI 2021: 50 ML/MIN/1.73M2
ERYTHROCYTE [DISTWIDTH] IN BLOOD BY AUTOMATED COUNT: 14.9 % (ref 10–15)
GLUCOSE SERPL-MCNC: 101 MG/DL (ref 70–99)
HCO3 SERPL-SCNC: 21 MMOL/L (ref 22–29)
HCT VFR BLD AUTO: 35.6 % (ref 35–47)
HGB BLD-MCNC: 11.3 G/DL (ref 11.7–15.7)
MAGNESIUM SERPL-MCNC: 2.2 MG/DL (ref 1.7–2.3)
MCH RBC QN AUTO: 30.7 PG (ref 26.5–33)
MCHC RBC AUTO-ENTMCNC: 31.7 G/DL (ref 31.5–36.5)
MCV RBC AUTO: 97 FL (ref 78–100)
NT-PROBNP SERPL-MCNC: ABNORMAL PG/ML (ref 0–1800)
PLATELET # BLD AUTO: 93 10E3/UL (ref 150–450)
POTASSIUM SERPL-SCNC: 4.5 MMOL/L (ref 3.4–5.3)
RBC # BLD AUTO: 3.68 10E6/UL (ref 3.8–5.2)
SODIUM SERPL-SCNC: 140 MMOL/L (ref 135–145)
WBC # BLD AUTO: 7.6 10E3/UL (ref 4–11)

## 2024-10-11 PROCEDURE — 99233 SBSQ HOSP IP/OBS HIGH 50: CPT | Performed by: HOSPITALIST

## 2024-10-11 PROCEDURE — 97530 THERAPEUTIC ACTIVITIES: CPT | Mod: GP | Performed by: PHYSICAL THERAPIST

## 2024-10-11 PROCEDURE — 36415 COLL VENOUS BLD VENIPUNCTURE: CPT | Performed by: INTERNAL MEDICINE

## 2024-10-11 PROCEDURE — 85027 COMPLETE CBC AUTOMATED: CPT | Performed by: INTERNAL MEDICINE

## 2024-10-11 PROCEDURE — 83735 ASSAY OF MAGNESIUM: CPT | Performed by: HOSPITALIST

## 2024-10-11 PROCEDURE — 250N000013 HC RX MED GY IP 250 OP 250 PS 637: Performed by: NURSE PRACTITIONER

## 2024-10-11 PROCEDURE — 80048 BASIC METABOLIC PNL TOTAL CA: CPT | Performed by: INTERNAL MEDICINE

## 2024-10-11 PROCEDURE — 250N000013 HC RX MED GY IP 250 OP 250 PS 637: Performed by: STUDENT IN AN ORGANIZED HEALTH CARE EDUCATION/TRAINING PROGRAM

## 2024-10-11 PROCEDURE — 120N000001 HC R&B MED SURG/OB

## 2024-10-11 PROCEDURE — 83880 ASSAY OF NATRIURETIC PEPTIDE: CPT | Performed by: HOSPITALIST

## 2024-10-11 PROCEDURE — 250N000013 HC RX MED GY IP 250 OP 250 PS 637: Performed by: HOSPITALIST

## 2024-10-11 PROCEDURE — 97110 THERAPEUTIC EXERCISES: CPT | Mod: GP | Performed by: PHYSICAL THERAPIST

## 2024-10-11 PROCEDURE — 250N000013 HC RX MED GY IP 250 OP 250 PS 637

## 2024-10-11 RX ORDER — FUROSEMIDE 10 MG/ML
40 INJECTION INTRAMUSCULAR; INTRAVENOUS EVERY 12 HOURS
Status: COMPLETED | OUTPATIENT
Start: 2024-10-11 | End: 2024-10-12

## 2024-10-11 RX ORDER — METOPROLOL TARTRATE 1 MG/ML
2.5 INJECTION, SOLUTION INTRAVENOUS EVERY 4 HOURS PRN
Status: DISCONTINUED | OUTPATIENT
Start: 2024-10-11 | End: 2024-10-15 | Stop reason: HOSPADM

## 2024-10-11 RX ADMIN — SENNOSIDES AND DOCUSATE SODIUM 2 TABLET: 50; 8.6 TABLET ORAL at 08:55

## 2024-10-11 RX ADMIN — METOPROLOL TARTRATE 12.5 MG: 25 TABLET, FILM COATED ORAL at 08:55

## 2024-10-11 RX ADMIN — ACETAMINOPHEN 975 MG: 325 TABLET ORAL at 16:30

## 2024-10-11 RX ADMIN — ACETAMINOPHEN 975 MG: 325 TABLET ORAL at 08:55

## 2024-10-11 RX ADMIN — AMOXICILLIN AND CLAVULANATE POTASSIUM 1 TABLET: 500; 125 TABLET, FILM COATED ORAL at 09:01

## 2024-10-11 RX ADMIN — POLYETHYLENE GLYCOL 3350 17 G: 17 POWDER, FOR SOLUTION ORAL at 08:55

## 2024-10-11 RX ADMIN — METOPROLOL TARTRATE 12.5 MG: 25 TABLET, FILM COATED ORAL at 20:08

## 2024-10-11 RX ADMIN — APIXABAN 2.5 MG: 2.5 TABLET, FILM COATED ORAL at 08:55

## 2024-10-11 RX ADMIN — AMOXICILLIN AND CLAVULANATE POTASSIUM 1 TABLET: 500; 125 TABLET, FILM COATED ORAL at 20:29

## 2024-10-11 RX ADMIN — APIXABAN 2.5 MG: 2.5 TABLET, FILM COATED ORAL at 20:08

## 2024-10-11 ASSESSMENT — ACTIVITIES OF DAILY LIVING (ADL)
ADLS_ACUITY_SCORE: 58
ADLS_ACUITY_SCORE: 57
ADLS_ACUITY_SCORE: 58
ADLS_ACUITY_SCORE: 57
ADLS_ACUITY_SCORE: 58
ADLS_ACUITY_SCORE: 57
ADLS_ACUITY_SCORE: 58
ADLS_ACUITY_SCORE: 57
ADLS_ACUITY_SCORE: 58
ADLS_ACUITY_SCORE: 58

## 2024-10-11 NOTE — PROGRESS NOTES
[facility]    Medicine Progress Note - Hospitalist Service    Date of Admission:  10/6/2024    Interval History   On morning encounter patient reports some leg pain, in the past responsive to as needed Tylenol, nursing to dose.  However on exam also noted 2+ bilateral symmetrical edema.  On Eliquis for PAF.  Denies PND, orthopnea.  Known history CM, echo see below.  No significant BM, minimal response to MiraLAX and senna S.  Denies abdominal pain, nausea, emesis.  Last evening question of A-fib, metoprolol which was on hold due to soft BP restarted.  This morning sinus, rate 70.  SBP 100s..      Assessment & Plan   Benita Hubbard is a 89 year old female with h/o  HTN, non ischemic Cardiomyopathy, CAD, neuropathy, history of UTI  was found on the floor in her independent apartment for an unknown period of time (found on welfare check) and was brought to the ER by EMS.  She was found to have left femoral neck fracture and admitted on 10/6/2024 for further management.      Following admission of 10/6 she had 2 subsequent RRT's.  First RRT early a.m on 10/7 for chest pain and shortness of breath.  Extensive evaluation as outlined below.  Noted to have refractory lactic acidosis throughout the day on 10/7.  Elevated troponin with cardiomyopathy seen by cardiology.  Hyperkalemia which is subsequently improved.  Treated for potential of UTI with antibiotics started.  Given significant amount of fluids.  Labs improved with resolution of lactic acidosis on 10/8 labs.     Lactic acidosis: (resolved)   - Patient with elevated lactic acid of 3.4 on admit 10/6   - Given fluids with reduction in lactate to 2.3 on 10/7 initially and then rebound increase 3.5>>4>>3.5   - D5NS at 100 since admit   - Differential diagnosis considered   - Elevated troponins with cardiology consult >> no intervention probable supply demand per cardiology discussion but noted to have cardiomyopathy  -RV dilation on echo  leading to CT PE exclusion (neg PE study)   -Potential underlying UTI placed on ceftriaxone.= then zosyn  -Gallbladder wall thickening based on CT abdomen with right upper quadrant ultrasound negative and normal LFT  -Mild rhabdomyolysis treated with fluids  - Multiple reassessments throughout the day regarding lactic acidosis and follow up.   - 10/8 Lactate normalized now 1.2 >> Exact etiology not confirmed but treated for multiple contributing factors and better.   - LACTATE now normal on 10/8 >> left TONY per Ortho 10/8/2024.  -10/9/2024 denies pain.  Remains in A-fib, rate controlled.  See Cardiology note, started on apixaban, Lovenox DVT PX PE per Ortho discontinued.  Will confirm with Ortho no aspirin on discharge.  Started on PT, patient [facility] in [facility].  -10/10/2024.  Denies pain.  Transition to sinus, rate 80.  Soft blood pressure precludes use of B2.  Started on apixaban for PAF and DVT PXP status post TONY.    Hyperkalemia: (resolved)   - At baseline potassium low 5 range and on ACE  - stopped ACE   - Lucero to alleviate urinary emptying, Lucero since removed, negative PVRs.  - monitor potassium   - Peak potassium 5.7 >> given fluids with lucero placed   - 10/8 potassium normal 4.6, 10/9/2024 at 4.8, recheck BMP in a.m K at 4.5.    MAGNO mild  10/10/2024 mild bump in creatinine 1.16, before surgery 0.86.  Suspect related to contrast dye.  Patient urinating.  On antibiotics for UTI.  DC Lucero, voiding trial negative..  Encourage p.o. intake.  BMP in AM with CR improved 1.04.  BMP in AM..    H/o nonischemic cardiomyopathy  Elevated troponin (type 2 MI secondary to supply/demand) tachycardia, acidosis,   Cardiology consult   10/8 new onset of afib, 10/9/2024 rate controlled. Spontaneous conversion to sinus 10/10/24  - LVEF was 40-45% in 2014.  Coronary angiogram showed normal coronaries.  LVEF subsequently normalized on ECHO 5/2014   - EKG with RBBB  - Troponin up  [phone]  - Cardiology consult with elevated lactate and troponin   - 10/7 ECHO with EF 40-45% Flattened septum with RV pressure/volume overload. RV dilated. Moderate TR, mild MD  - with RV dilation PE excluded with CT   - Discussions with cardiology and no interventions planned. >> still most likely from stress demand    - 10/8 no further cardiac interventions planned monitor.   - 10/8 afib early this am. (Rate controlled) no interventions planned.  Left TONY 10/8/2024  -10/9/2024, Cardiology started on apixaban, DC Lovenox DVT PX per Ortho.    10/11/2024-on exam today bilateral LE edema 2+, symmetrical.  Not noted on prior exams.  Patient denies PND, orthopnea.  Echo preop global hypokinesia, EF 40-45%.  Increased RV pressure, chest CT obtained negative PE.  Seen by cardiology.  Elevated troponins somewhat flat, declining, felt related to demand ischemia.  Patient subsequently had surgery, unclear if volume overload related to perioperative fluids.  Telemetry sinus, rate 70.  BNP checked 20 5K.  No hypoxia.  No chest pain, palpitations.  Given furosemide IV 40 mg x 2 today, reassess in a.m.  Potassium 4.5.  ADDENDUM; nurses state pulses ranging 113-131.  -110.  Known PAF on po metoprolol.  Orders placed for as needed IV metoprolol.  However on review of records at 16:44 pulse 72.  Close monitor.    Suspected fall, unclear etiology of fall  Left femoral neck fracture, acute, displaced  CT abdomen/pelvis displaced and angulated left femoral neck with nondisplaced fracture of the left pubic bone and left inferior and superior pubic rami.   Left hip hemiarthroplasty placed.  10/8/2024  - Unclear etiology of fall.  She was found on floor.    - Patient does not remember when she fell, and also the mechanism.   -  A bruise in her chin was noted 3 days ago by son, patient did not remember falling then as well.    - Uses walker mostly for mobility.  - Multiple imaging studies including left shoulder x-ray, chest  "x-ray, pelvis and hip x-ray,   - CT head and CT C-spine completed, noted left femoral neck fracture with displacement.    - 10/7 ortho consult   - 10/8 ortho surgery planned. Aware of pelvic fractures   - 10/8 left TONY 10/8/2024  -Lovenox DVT PX PE transition to apixaban in view of A-fib.  See above.  -PT recommends TCU.  -10/10/2024, see Ortho note. \"Continue Eliquis 2.5 mg BID for atrial fibrillation and DVT prophylaxis.              Hgb stable.              Mobilize with PT/OT.               WBAT LLE with use of assistive device.                No left hip adduction past midline for 6-8 weeks postop.              Continue current pain regimen.              Dressings: Keep intact. Change if >60% saturated or peeling off.               Follow-up: 2 weeks post-op with Dr. Julian team\"      Acute hypoxic respiratory failure  CT no PE, effusions,   Potential Right lung base pneumonia   Few small pulmonary nodules  - Difficult to evaluate oxygen levels with finger probe, ear probe and head probe.   - When able to get what appears to be good wave forms in mid 90%  - face mask as mouth breather  - CT with no obvious PE, small effusions Left greater than right   - on ceftriaxone for UTI vs PNA initially now on zosyn, will Rx 5 days total. Follow-up US.  - ECHO with 40-45% with holding lasix until after surgery with creatinine   -UCx Aerococcus urinae and sanguinicola; US pending  -Due to cephalosporin allergy transition to Augmentin 500 mg twice daily for 5 more doses total 5-day antibiotic course.  DC Zosyn.        Rhabdomyolysis  CK mildly elevated at  748.  Suspect related to fall.  -IV hydration and follow  - Repeat 393   -      UTI  Urine culture sent   - UA positive with catheter  - CT with mild diffuse bladder wall thickening and enhancement ? Cystitis.   - started on ceftriaxone. 10/7 >> prior Urine with aerococcus so changed to zosyn.   - 10/7 changed to zosyn >> await cultures and taper as able.   - 10/8 " blood cultures NGTD  -Eating well, DC IV fluids especially in view of IV shortage.  -Due to cephalosporin allergy transition to Augmentin 500 mg twice daily for 5 more doses total 5-day antibiotic course.  DC Zosyn.    Diffuse Gallbladder wall thickening:   - on CT imaging abdomen   - Liver profile negative.   - 10/7 RUQ ultrasound with no cholecystitis      Thrombocytopenia  Mild thrombocytopenia, platelet count 112.  -Monitor  -Check iron panel, B12 and folate level.  -10/9/2024 platelet count 60,000, CBC in AM platelets 85K.  No active signs of bleeding..    Compression fractures.   T5 and L3 indeterminate on CT chest this admit (prior imaging suggest prior on imaging)   - 7/2019 xray of lumbar spine with mild L3 compression fracture   - 9/2021 CT chest with age indeterminate T4, mild T5 and T6   - 10/8 attempted to review with radiology but the films are not available here. (Can readdress post surgery>> Patient denies any back pain and imaging appears to suggest noted in past)       Cognitive impairment  Acute encephalopathy   Son reports patient is forgetful and has short-term memory impairment.    - No formal diagnosis of dementia  - CT head no acute injury   - baseline short term memory problems now with lyrica, dilaudid and oxycodone on nights >> Stopped opioids with rectal tylenol.   - Would not resume lyrica after surgery (this was stopped in clinic a few weeks ago)     Constipation.  Likely multifactorial including decreased activities, decreased p.o. intake.  Minimal response to MiraLAX, senna S.  Declog suppository today.  Abdominal exam benign.  No associated GI symptoms, specifically no nausea, emesis.  Monitor.    GOALS of care:   Prior hospitalist multiple discussions with sons throughout the day. Patient with instability, lactic acidosis, hyperkalemia, Lethargy, fall.   Patient was able to confirm herself she was DNR/DNI. Family in agreement with supportive treatment with fluids, PE, Cardiac  evaluation but not planning to increase level of ICU, intubation, lines.  Patient is full code.  10/8 updated family with labs and plans for surgery   -Postop, Son[s] present on encounter, updated.         Diet: Advance Diet as Tolerated: Regular Diet Adult    DVT Prophylaxis: Pneumatic Compression Devices  Torres Catheter: Not present  Lines: None     Cardiac Monitoring: None  Code Status: Full Code      Clinically Significant Risk Factors          # Hyperchloremia: Highest Cl = 110 mmol/L in last 2 days, will monitor as appropriate            # Thrombocytopenia: Lowest platelets = 85 in last 2 days, will monitor for bleeding   # Hypertension: Noted on problem list               # Financial/Environmental Concerns: none         Disposition Plan     Medically Ready for Discharge: Anticipated in 2-4 Days     Shaylee Novoa MD  Hospitalist Service  [facility]  Securely message with Vocera (more info)  Text page via [employer] Paging/[employer] ([employer]lip[employer])y       Total time 50 minutes for today 10/11/2024 :  time consisted of the following, examination of patient, review of records including labs, imaging results, medications, interdisciplinary notes and completing documentation and orders.  Care Management included counseling/discussion with Patient and son Nkio regarding current condition including postop pain, PT, fluid volume overload, history of PAF and Coordination of Care time with Nursing  and Specialists, Ortho regarding care plan, management and surveillance.   ______________________________________________________________________  Physical Exam   Vital Signs: Temp: 98.2  F (36.8  C) Temp src: Oral BP: 115/73 Pulse: 72   Resp: 16 SpO2: 99 % O2 Device: None (Room air) Oxygen Delivery: 2 LPM  Weight: 122 lbs 5.68 oz    General Appearance: NAD, awake, calm, cooperative, appears more tired.  Respiratory: Respirations nonlabored room air  Cardiovascular: Regular, sinus, rate of 70s.  2/4 bilateral LE  edema, symmetrical, negative Homans.  GI: + Soft nontender  MSK left hip ROM not tested, ambulation not tested  Neuro.  Gross motor tested, nonfocal,   Psych orientation to person and place, affect calm.      Data     I have personally reviewed the following data over the past 24 hrs:    7.6  \   11.3 (L)   / 93 (L)     140 107 42.1 (H) /  101 (H)   4.5 21 (L) 1.04 (H) \     Trop: N/A BNP: 25,058 (H)       Imaging results reviewed over the past 24 hrs:

## 2024-10-11 NOTE — PLAN OF CARE
Goal Outcome Evaluation:  Date/Time 10/11/24     Trauma/Ortho/Medical (Choose one) Trauma    Diagnosis: L Hemiarthroplasty  POD#:3  Mental Status:4  Activity/dangle up with 2 and Kyra Steady  Diet: Reg  Pain: Tylenol  Torres/Voiding: BM today and voided in BR, does have incontinence.  Tele/Restraints/Iso:Tele-NSR  02/LDA: VIVIAN  D/C Date: TBD  Other Info:Family very involved

## 2024-10-11 NOTE — PROVIDER NOTIFICATION
Pt's HR is fluctuating between , pt is resting in her bed.  Notified  via [employer] messaging, awaiting new orders/call back.  Will continue to monitor pt.   New PRN orders given see MAR.

## 2024-10-11 NOTE — PROGRESS NOTES
"Date & Time: 10/10/24 8525-8091  Summary: POD# 3 Left Hip Hemiarthroplasty  Orientation/Cognitive: A&O to self and situation times, Disoriented to time and place. Needs constant redirections.  Mobility Level/Assist Equipment: A1 with miladys steady  Fall Risk (Y/N): Yes  Behavior Concerns: Green  Pain Management: Schedule Tylenol  Tele/VS/O2: VSS on RA, had patient on 1L of oxygen via NC over night due to low oxygen level. TELE: A flutter/NSR  ABNL Lab/BG: Creat. 1.16, Sean 8.4,  GFR 44, Urea 38.7, Chloride 110  Diet: Regular diet, No straws  Bowel/Bladder: Incontinent of bowel and bladder. Torres was taken out at  1423 and patient is due to void Bladder scan at 2000 was 80 ml  Skin Concerns: Generalized scattered abrasion and bruises from fall. Left hip surgical dressing CDI.  Drains/Devices: Right PIV SL  Tests/Procedures for next shift: None  Anticipated DC date & active delays: Pending TCU placement at [facility] if medical stable.  Other important information: Medications crushed in apple sauce. Patient should be seated straight up for meals and when taking medications. Mag replacement protocol, Am lab draw.    /67 (BP Location: Left arm, Patient Position: Semi-Valenzuela's, Cuff Size: Adult Regular)   Pulse 74   Temp 98.2  F (36.8  C) (Axillary)   Resp 16   Ht 1.651 m (5' 5\")   Wt 55.5 kg (122 lb 5.7 oz)   SpO2 95%   BMI 20.36 kg/m      "

## 2024-10-11 NOTE — PROGRESS NOTES
"Pt just returned form a \"walk \" with her son (Papito), pt was in WC.  Pt was gone about 30 minutes.  Notified her son that pt is not allowed to go off the floor, the son was apologetic.  Will continue to monitor.    "

## 2024-10-11 NOTE — PROGRESS NOTES
Orthopedic Surgery  Benita Hubbard  10/11/2024     Admit Date:  10/6/2024    POD: 3 Days Post-Op   Procedure(s):  HIP HEMIARTHROPLASTY LEFT  Nondisplaced fracture of the left pubic bone and left inferior and superior pubic rami (nonop)    Patient resting comfortably in bed  Denies pain to the left hip at rest.   Denies numbness and tingling to the LLE.  Tolerating oral intake.    Denies nausea or vomiting.  Denies chest pain or shortness of breath.  Metoprolol resumed overnight due to rising HR and atrial fibrillation. This will delay discharge     Temp:  [97.4  F (36.3  C)-98.2  F (36.8  C)] 98.2  F (36.8  C)  Pulse:  [] 72  Resp:  [16] 16  BP: ()/(57-73) 115/73  SpO2:  [91 %-99 %] 99 %    Alert and oriented. NAD.   Left hip gauze/tegaderm dressing is clean, dry, and intact.   No erythema of the surrounding skin, but minimal surrounding ecchymosis.  Thigh is soft and compressible without tenderness.  Bilateral calves are soft, non-tender.  Left lower extremity is NVI.  Sensation intact bilateral lower extremities.  2+Dp pulse    Labs:  Recent Labs   Lab Test 10/11/24  0741 10/10/24  0555 10/09/24  0539   WBC 7.6 7.7 8.0   HGB 11.3* 10.8* 11.2*   PLT 93* 85* 60*     1. Plan:   Continue Eliquis 2.5 mg BID for atrial fibrillation and DVT prophylaxis.   Hgb stable.   Mobilize with PT/OT.    WBAT LLE with use of assistive device.     No left hip adduction past midline for 6-8 weeks postop.   Continue current pain regimen.   Dressings: Keep intact. Change if >60% saturated or peeling off.    Follow-up: 2 weeks post-op with Dr. Julian team    2. Disposition:   Anticipate d/c to TCU when medically cleared. Ortho stable.    Nunu Jones PA-C  [facility] Orthopedics

## 2024-10-12 LAB
ANION GAP SERPL CALCULATED.3IONS-SCNC: 13 MMOL/L (ref 7–15)
BUN SERPL-MCNC: 39.7 MG/DL (ref 8–23)
CALCIUM SERPL-MCNC: 8.8 MG/DL (ref 8.8–10.4)
CHLORIDE SERPL-SCNC: 106 MMOL/L (ref 98–107)
CREAT SERPL-MCNC: 1.03 MG/DL (ref 0.51–0.95)
EGFRCR SERPLBLD CKD-EPI 2021: 50 ML/MIN/1.73M2
ERYTHROCYTE [DISTWIDTH] IN BLOOD BY AUTOMATED COUNT: 14.8 % (ref 10–15)
GLUCOSE SERPL-MCNC: 123 MG/DL (ref 70–99)
HCO3 SERPL-SCNC: 23 MMOL/L (ref 22–29)
HCT VFR BLD AUTO: 38.2 % (ref 35–47)
HGB BLD-MCNC: 12.1 G/DL (ref 11.7–15.7)
MAGNESIUM SERPL-MCNC: 2.1 MG/DL (ref 1.7–2.3)
MCH RBC QN AUTO: 30.3 PG (ref 26.5–33)
MCHC RBC AUTO-ENTMCNC: 31.7 G/DL (ref 31.5–36.5)
MCV RBC AUTO: 96 FL (ref 78–100)
PLATELET # BLD AUTO: 145 10E3/UL (ref 150–450)
POTASSIUM SERPL-SCNC: 4.2 MMOL/L (ref 3.4–5.3)
RBC # BLD AUTO: 3.99 10E6/UL (ref 3.8–5.2)
SODIUM SERPL-SCNC: 142 MMOL/L (ref 135–145)
WBC # BLD AUTO: 9.4 10E3/UL (ref 4–11)

## 2024-10-12 PROCEDURE — 250N000013 HC RX MED GY IP 250 OP 250 PS 637: Performed by: INTERNAL MEDICINE

## 2024-10-12 PROCEDURE — 120N000001 HC R&B MED SURG/OB

## 2024-10-12 PROCEDURE — 250N000011 HC RX IP 250 OP 636: Performed by: HOSPITALIST

## 2024-10-12 PROCEDURE — 83735 ASSAY OF MAGNESIUM: CPT | Performed by: HOSPITALIST

## 2024-10-12 PROCEDURE — 99232 SBSQ HOSP IP/OBS MODERATE 35: CPT | Performed by: HOSPITALIST

## 2024-10-12 PROCEDURE — 250N000013 HC RX MED GY IP 250 OP 250 PS 637: Performed by: HOSPITALIST

## 2024-10-12 PROCEDURE — 80048 BASIC METABOLIC PNL TOTAL CA: CPT | Performed by: INTERNAL MEDICINE

## 2024-10-12 PROCEDURE — 250N000013 HC RX MED GY IP 250 OP 250 PS 637

## 2024-10-12 PROCEDURE — 36415 COLL VENOUS BLD VENIPUNCTURE: CPT | Performed by: INTERNAL MEDICINE

## 2024-10-12 PROCEDURE — 85027 COMPLETE CBC AUTOMATED: CPT | Performed by: INTERNAL MEDICINE

## 2024-10-12 PROCEDURE — 250N000013 HC RX MED GY IP 250 OP 250 PS 637: Performed by: NURSE PRACTITIONER

## 2024-10-12 PROCEDURE — 250N000013 HC RX MED GY IP 250 OP 250 PS 637: Performed by: STUDENT IN AN ORGANIZED HEALTH CARE EDUCATION/TRAINING PROGRAM

## 2024-10-12 RX ADMIN — METOPROLOL TARTRATE 12.5 MG: 25 TABLET, FILM COATED ORAL at 20:05

## 2024-10-12 RX ADMIN — SENNOSIDES AND DOCUSATE SODIUM 2 TABLET: 50; 8.6 TABLET ORAL at 07:58

## 2024-10-12 RX ADMIN — AMOXICILLIN AND CLAVULANATE POTASSIUM 1 TABLET: 500; 125 TABLET, FILM COATED ORAL at 08:07

## 2024-10-12 RX ADMIN — ACETAMINOPHEN 650 MG: 325 TABLET ORAL at 07:58

## 2024-10-12 RX ADMIN — METOPROLOL TARTRATE 12.5 MG: 25 TABLET, FILM COATED ORAL at 07:59

## 2024-10-12 RX ADMIN — SENNOSIDES AND DOCUSATE SODIUM 2 TABLET: 50; 8.6 TABLET ORAL at 20:06

## 2024-10-12 RX ADMIN — APIXABAN 2.5 MG: 2.5 TABLET, FILM COATED ORAL at 20:06

## 2024-10-12 RX ADMIN — POLYETHYLENE GLYCOL 3350 17 G: 17 POWDER, FOR SOLUTION ORAL at 07:59

## 2024-10-12 RX ADMIN — FUROSEMIDE 40 MG: 10 INJECTION, SOLUTION INTRAMUSCULAR; INTRAVENOUS at 00:11

## 2024-10-12 RX ADMIN — AMOXICILLIN AND CLAVULANATE POTASSIUM 1 TABLET: 500; 125 TABLET, FILM COATED ORAL at 20:06

## 2024-10-12 RX ADMIN — APIXABAN 2.5 MG: 2.5 TABLET, FILM COATED ORAL at 07:59

## 2024-10-12 RX ADMIN — ACETAMINOPHEN 650 MG: 325 TABLET ORAL at 13:26

## 2024-10-12 ASSESSMENT — ACTIVITIES OF DAILY LIVING (ADL)
ADLS_ACUITY_SCORE: 54
ADLS_ACUITY_SCORE: 58
ADLS_ACUITY_SCORE: 58
ADLS_ACUITY_SCORE: 54
ADLS_ACUITY_SCORE: 58
ADLS_ACUITY_SCORE: 54
ADLS_ACUITY_SCORE: 54
ADLS_ACUITY_SCORE: 58
ADLS_ACUITY_SCORE: 54
ADLS_ACUITY_SCORE: 58

## 2024-10-12 NOTE — PLAN OF CARE
VSS except HR running between  on Tele. CMS intact. PCD and edema wear in place. Pain managed with tylenol. Left hip dressing C/D/I. Up with Sean and miladys guerrero. Incontinent of bladder. Takes pills crushed with apple sauce. Tele A-Fib with RVR. A&Ox3 with intermittent confusion.

## 2024-10-12 NOTE — PROGRESS NOTES
[facility]    Medicine Progress Note - Hospitalist Service    Date of Admission:  10/6/2024    Interval History   On morning encounter patient reports some leg pain, in the past responsive to as needed Tylenol, nursing to dose.  However on exam also noted 2+ bilateral symmetrical edema.  On Eliquis for PAF.  Denies PND, orthopnea.  Known history CM, echo see below.  No significant BM, minimal response to MiraLAX and senna S.  Denies abdominal pain, nausea, emesis.  Last evening question of A-fib, metoprolol which was on hold due to soft BP restarted.  This morning sinus, rate 70.  SBP 100s..      Assessment & Plan   Benita Hubbard is a 89 year old female with h/o  HTN, non ischemic Cardiomyopathy, CAD, neuropathy, history of UTI  was found on the floor in her independent apartment for an unknown period of time (found on welfare check) and was brought to the ER by EMS.  She was found to have left femoral neck fracture and admitted on 10/6/2024 for further management.      Following admission of 10/6 she had 2 subsequent RRT's.  First RRT early a.m on 10/7 for chest pain and shortness of breath.  Extensive evaluation as outlined below.  Noted to have refractory lactic acidosis throughout the day on 10/7.  Elevated troponin with cardiomyopathy seen by cardiology.  Hyperkalemia which is subsequently improved.  Treated for potential of UTI with antibiotics started.  Given significant amount of fluids.  Labs improved with resolution of lactic acidosis on 10/8 labs.     Lactic acidosis: (resolved)   - Patient with elevated lactic acid of 3.4 on admit 10/6   - Given fluids with reduction in lactate to 2.3 on 10/7 initially and then rebound increase 3.5>>4>>3.5   - Multiple reassessments throughout the day regarding lactic acidosis and follow up.   - 10/8 Lactate normalized now 1.2 >> Exact etiology not confirmed but treated for multiple contributing factors and better.   - LACTATE now normal on  10/8 >> left TONY per Ortho 10/8/2024.    Hyperkalemia: (resolved)   - At baseline potassium low 5 range and on ACE  - stopped ACE   - Lucero to alleviate urinary emptying, Lucero since removed, negative PVRs.  - monitor potassium   - Peak potassium 5.7 >> given fluids with lucero placed   - 10/8 potassium normal 4.6, 10/9/2024 at 4.8, recheck BMP in a.m K at  4/2     MAGNO mild  10/10/2024 mild bump in creatinine 1.16, before surgery 0.86.  Suspect related to contrast dye.  Patient urinating.  On antibiotics for UTI.  DC Lucero, voiding trial negative..  Encourage p.o. intake.  BMP 10/12/2024 with CR improved 1.03 BMP in AM..    H/o nonischemic cardiomyopathy  Elevated troponin (type 2 MI secondary to supply/demand) tachycardia, acidosis,   Cardiology consult   10/8 new onset of afib, 10/9/2024 rate controlled. Spontaneous conversion to sinus 10/10/24; 10/12/24 back in a fib CVR  Fluid volume overload 10/11/24.  - LVEF was 40-45% in 2014.  Coronary angiogram showed normal coronaries.  LVEF subsequently normalized on ECHO 5/2014   - EKG with RBBB  - Troponin up [id number]  - Cardiology consult with elevated lactate and troponin   - 10/7 ECHO with EF 40-45% Flattened septum with RV pressure/volume overload. RV dilated. Moderate TR, mild AZ  - with RV dilation PE excluded with CT   - Discussions with cardiology and no interventions planned. >> still most likely from stress demand    - 10/8 no further cardiac interventions planned monitor.   - 10/8 afib early this am. (Rate controlled) no interventions planned.  Left TONY 10/8/2024  -10/9/2024, Cardiology started on apixaban, DC Lovenox DVT PX per Ortho.    10/11/2024-on exam today bilateral LE edema 2+, symmetrical.  Not noted on prior exams.  Patient denies PND, orthopnea.  Echo preop global hypokinesia, EF 40-45%.  Increased RV pressure, chest CT obtained negative PE.  Seen by cardiology.  Elevated troponins somewhat flat, declining, felt related to demand ischemia.   "Patient subsequently had surgery, unclear if volume overload related to perioperative fluids.  Telemetry sinus, rate 70.  BNP checked 20.5K.  No hypoxia.  No chest pain, palpitations.  Given furosemide IV 40 mg x 2 today, reassess in a.m.  Potassium 4.5.  ADDENDUM; 10/11/2024 nurses state pulses ranging 113-131.  -110.  Known PAF on po metoprolol.  Orders placed for as needed IV metoprolol.  However on review of records at 16:44 pulse 72.  Close monitor.  10/12/2024, telemetry with A-fib, on apixaban.  On metoprolol.  Given BP on soft side will not be further aggressive regarding diuresis to allow use of B2.  However yesterday BNP 20K, I's/O- 1.2/24 hours.  Pulse this morning controlled rate 83.  For her edema advised daily weights, I's/O, lower extremity edema wear, leg elevation.  Monitor.    Suspected fall, unclear etiology of fall  Left femoral neck fracture, acute, displaced  CT abdomen/pelvis displaced and angulated left femoral neck with nondisplaced fracture of the left pubic bone and left inferior and superior pubic rami.   Left hip hemiarthroplasty placed.  10/8/2024  - Unclear etiology of fall.  She was found on floor.    - Patient does not remember when she fell, and also the mechanism.   -  A bruise in her chin was noted 3 days ago by son, patient did not remember falling then as well.    - Uses walker mostly for mobility.  - Multiple imaging studies including left shoulder x-ray, chest x-ray, pelvis and hip x-ray,   - CT head and CT C-spine completed, noted left femoral neck fracture with displacement.    - 10/7 ortho consult   - 10/8 ortho surgery planned. Aware of pelvic fractures   - 10/8 left TONY 10/8/2024  -Lovenox DVT PX PE transition to apixaban in view of A-fib.  See above.  -PT recommends TCU.  -10/10/2024, see Ortho note. \"Continue Eliquis 2.5 mg BID for atrial fibrillation and DVT prophylaxis.              Hgb stable.              Mobilize with PT/OT.               WBAT LLE with use " "of assistive device.                No left hip adduction past midline for 6-8 weeks postop.              Continue current pain regimen.              Dressings: Keep intact. Change if >60% saturated or peeling off.               Follow-up: 2 weeks post-op with Dr. Julian team\"      Acute hypoxic respiratory failure  CT no PE, effusions,   Potential Right lung base pneumonia   Few small pulmonary nodules  - Difficult to evaluate oxygen levels with finger probe, ear probe and head probe.   - When able to get what appears to be good wave forms in mid 90%  - face mask as mouth breather  - CT with no obvious PE, small effusions Left greater than right   - on ceftriaxone for UTI vs PNA initially now on zosyn, will Rx 5 days total. Follow-up US.  - ECHO with 40-45% with holding lasix until after surgery with creatinine   -UCx Aerococcus urinae and sanguinicola; US pending  -Due to cephalosporin allergy transition to Augmentin 500 mg twice daily for 5 more doses total 5-day antibiotic course.  DC Zosyn.        Rhabdomyolysis  CK mildly elevated at  748.  Suspect related to fall.  -IV hydration and follow  - Repeat 393   -      UTI  Urine culture sent   - UA positive with catheter  - CT with mild diffuse bladder wall thickening and enhancement ? Cystitis.   - started on ceftriaxone. 10/7 >> prior Urine with aerococcus so changed to zosyn.   - 10/7 changed to zosyn >> await cultures and taper as able.   - 10/8 blood cultures NGTD  -Eating well, DC IV fluids especially in view of IV shortage.  -Due to cephalosporin allergy transition to Augmentin 500 mg twice daily for 5 more doses total 5-day antibiotic course.  DC Zosyn.    Diffuse Gallbladder wall thickening:   - on CT imaging abdomen   - Liver profile negative.   - 10/7 RUQ ultrasound with no cholecystitis      Thrombocytopenia  Mild thrombocytopenia, platelet count 112.  -Monitor  -Check iron panel, B12 and folate level.  -10/9/2024 platelet count 60,000, CBC in AM " platelets 85K.  No active signs of bleeding..    Compression fractures.   T5 and L3 indeterminate on CT chest this admit (prior imaging suggest prior on imaging)   - 7/2019 xray of lumbar spine with mild L3 compression fracture   - 9/2021 CT chest with age indeterminate T4, mild T5 and T6   - 10/8 attempted to review with radiology but the films are not available here. (Can readdress post surgery>> Patient denies any back pain and imaging appears to suggest noted in past)       Cognitive impairment  Acute encephalopathy   Son reports patient is forgetful and has short-term memory impairment.    - No formal diagnosis of dementia  - CT head no acute injury   - baseline short term memory problems now with lyrica, dilaudid and oxycodone on nights >> Stopped opioids with rectal tylenol.   - Would not resume lyrica after surgery (this was stopped in clinic a few weeks ago)     Constipation.  Likely multifactorial including decreased activities, decreased p.o. intake.  Minimal response to MiraLAX, senna S.  Abdominal exam benign.  No associated GI symptoms, specifically no nausea, emesis. suppository yesterday with 'substantial BM'  Monitor.    GOALS of care:    Patient was able to confirm herself she was DNR/DNI per prior Hospitalist. Family in agreement . .  Patient is full code.  10/8 updated family with labs and plans for surgery   -Postop, Son[s] present on encounter, updated.         Diet: Advance Diet as Tolerated: Regular Diet Adult    DVT Prophylaxis: Pneumatic Compression Devices  Torres Catheter: Not present  Lines: None     Cardiac Monitoring: ACTIVE order. Indication: Tachyarrhythmias, acute (48 hours)  Code Status: Full Code      Clinically Significant Risk Factors                 # Thrombocytopenia: Lowest platelets = 93 in last 2 days, will monitor for bleeding   # Hypertension: Noted on problem list               # Financial/Environmental Concerns: none         Disposition Plan     Medically Ready for  Discharge: 2-3 days pending fluid status and rate control     Shaylee Novoa MD  Hospitalist Service  [facility]  Securely message with Vocera (more info)  Text page via [employer] Paging/Directory       Total time 50 minutes for today 10/11/2024 :  time consisted of the following, examination of patient, review of records including labs, imaging results, medications, interdisciplinary notes and completing documentation and orders.  Care Management included counseling/discussion with Patient and son Niko regarding current condition including postop pain, PT, fluid volume overload, history of PAF and Coordination of Care time with Nursing  and Specialists, Ortho regarding care plan, management and surveillance.   ______________________________________________________________________  Physical Exam   Vital Signs: Temp: 97.6  F (36.4  C) Temp src: Oral BP: 121/85 Pulse: 83   Resp: 16 SpO2: 100 % O2 Device: None (Room air)    Weight: 115 lbs 4.81 oz    General Appearance: NAD, awake, calm, cooperative, appears more alert, stronger.  Respiratory: Respirations nonlabored room air  Cardiovascular: Regular, sinus, rate of 80s, 2/4 bilateral LE edema, symmetrical, negative Homans, some serial improvement..  GI: + Soft nontender  MSK left hip ROM not tested, ambulation not tested  Neuro.  Gross motor tested, nonfocal,   Psych orientation to person and place, affect calm.      Data     I have personally reviewed the following data over the past 24 hrs:    9.4  \   12.1   / 145 (L)     142 106 39.7 (H) /  123 (H)   4.2 23 1.03 (H) \       Imaging results reviewed over the past 24 hrs:

## 2024-10-12 NOTE — PROGRESS NOTES
Orthopedic Surgery  Benita Hubbard  10/12/2024     Admit Date:  10/6/2024    POD: 4 Days Post-Op   Procedure(s):  HIP HEMIARTHROPLASTY LEFT  Nondisplaced fracture of the left pubic bone and left inferior and superior pubic rami (nonop)    Patient resting comfortably in bed. Sleepy.  Denies pain to the left hip at rest. Pain overall improving.   Denies numbness and tingling to the LLE.  Denies muscle spasms or calf pain/cramping.  Tolerating oral intake.    Denies nausea or vomiting.  Denies chest pain or shortness of breath.  NAEO.    Temp:  [97.6  F (36.4  C)-98.5  F (36.9  C)] 97.6  F (36.4  C)  Pulse:  [] 83  Resp:  [16-17] 16  BP: (107-121)/(64-85) 121/85  SpO2:  [93 %-100 %] 100 %    Alert and oriented. NAD.   Left hip gauze/tegaderm dressing is clean, dry, and intact.   No erythema of the surrounding skin, but minimal surrounding ecchymosis.  Thigh is soft and compressible without tenderness.  Bilateral calves are soft, non-tender.  Left lower extremity is NVI.  Sensation intact bilateral lower extremities.  Able to DF and PF the ankles against resistance, bilaterally.  Full AROM of toes.  2+Dp pulse    Labs:  Recent Labs   Lab Test 10/12/24  1034 10/11/24  0741 10/10/24  0555   WBC 9.4 7.6 7.7   HGB 12.1 11.3* 10.8*   * 93* 85*     1. Plan:   Continue Eliquis 2.5 mg BID for atrial fibrillation and DVT prophylaxis.   Hgb stable and normalized.   Mobilize with PT/OT.    WBAT LLE with use of assistive device.     No left hip adduction past midline for 6-8 weeks postop.   Continue current pain regimen.   Dressings: Keep intact. Change if >60% saturated or peeling off.    Follow-up: 2 weeks post-op with Dr. Julian team    2. Disposition:   Anticipate d/c to TCU when medically cleared. Ortho stable. Ortho team will sign off at this time, but will see the patient on POD#14 should she remain inpatient. Otherwise, she can follow up with Dr. Titi Julian's team at that time. Please contact our team  with any questions or concerns.    Chela Pink PA-C  [facility]

## 2024-10-12 NOTE — PLAN OF CARE
Trauma/Ortho/Medical (Choose one) Trauma     Diagnosis: L Hemiarthroplasty  POD#:3  Mental Status:4  Activity/dangle up with 2 and Kyra Steady  Diet: Reg  Pain: Tylenol  Torres/Voiding: BM today and voided in BR, does have incontinence.  Tele/Restraints/Iso:Tele-NSR  02/LDA: VIVIAN  D/C Date: TBD  Other Info: purewick used for incontinence and lasix use overnight

## 2024-10-12 NOTE — PLAN OF CARE
Physical Therapy Discharge Summary    Reason for therapy discharge:    Discharged to transitional care facility.    Progress towards therapy goal(s). See goals on Care Plan in [facility] electronic health record for goal details.  Goals partially met.  Barriers to achieving goals:   discharge from facility.    Therapy recommendation(s):    Continued therapy is recommended.  Rationale/Recommendations: Pt is below baseline and will require continued skilled PT intervention at TCU in order to progress strength, activity tolerance and independent with mobility.  PT Brief overview of current status: Assist of 2 with Kyra Ocasio          Recommendation above provided by last treating therapist.

## 2024-10-13 LAB
ANION GAP SERPL CALCULATED.3IONS-SCNC: 12 MMOL/L (ref 7–15)
BACTERIA BLD CULT: NO GROWTH
BUN SERPL-MCNC: 33.9 MG/DL (ref 8–23)
CALCIUM SERPL-MCNC: 8.9 MG/DL (ref 8.8–10.4)
CHLORIDE SERPL-SCNC: 105 MMOL/L (ref 98–107)
CREAT SERPL-MCNC: 0.79 MG/DL (ref 0.51–0.95)
EGFRCR SERPLBLD CKD-EPI 2021: 69 ML/MIN/1.73M2
ERYTHROCYTE [DISTWIDTH] IN BLOOD BY AUTOMATED COUNT: 14.8 % (ref 10–15)
GLUCOSE SERPL-MCNC: 115 MG/DL (ref 70–99)
HCO3 SERPL-SCNC: 22 MMOL/L (ref 22–29)
HCT VFR BLD AUTO: 37 % (ref 35–47)
HGB BLD-MCNC: 12 G/DL (ref 11.7–15.7)
MAGNESIUM SERPL-MCNC: 1.9 MG/DL (ref 1.7–2.3)
MCH RBC QN AUTO: 30.8 PG (ref 26.5–33)
MCHC RBC AUTO-ENTMCNC: 32.4 G/DL (ref 31.5–36.5)
MCV RBC AUTO: 95 FL (ref 78–100)
PLATELET # BLD AUTO: 167 10E3/UL (ref 150–450)
POTASSIUM SERPL-SCNC: 4.5 MMOL/L (ref 3.4–5.3)
RBC # BLD AUTO: 3.89 10E6/UL (ref 3.8–5.2)
SODIUM SERPL-SCNC: 139 MMOL/L (ref 135–145)
WBC # BLD AUTO: 8.4 10E3/UL (ref 4–11)

## 2024-10-13 PROCEDURE — 83735 ASSAY OF MAGNESIUM: CPT | Performed by: HOSPITALIST

## 2024-10-13 PROCEDURE — 80048 BASIC METABOLIC PNL TOTAL CA: CPT | Performed by: INTERNAL MEDICINE

## 2024-10-13 PROCEDURE — 36415 COLL VENOUS BLD VENIPUNCTURE: CPT | Performed by: INTERNAL MEDICINE

## 2024-10-13 PROCEDURE — 250N000013 HC RX MED GY IP 250 OP 250 PS 637: Performed by: STUDENT IN AN ORGANIZED HEALTH CARE EDUCATION/TRAINING PROGRAM

## 2024-10-13 PROCEDURE — 250N000013 HC RX MED GY IP 250 OP 250 PS 637: Performed by: HOSPITALIST

## 2024-10-13 PROCEDURE — 250N000013 HC RX MED GY IP 250 OP 250 PS 637: Performed by: INTERNAL MEDICINE

## 2024-10-13 PROCEDURE — 250N000013 HC RX MED GY IP 250 OP 250 PS 637

## 2024-10-13 PROCEDURE — 250N000013 HC RX MED GY IP 250 OP 250 PS 637: Performed by: NURSE PRACTITIONER

## 2024-10-13 PROCEDURE — 120N000001 HC R&B MED SURG/OB

## 2024-10-13 PROCEDURE — 99233 SBSQ HOSP IP/OBS HIGH 50: CPT | Performed by: HOSPITALIST

## 2024-10-13 PROCEDURE — 85027 COMPLETE CBC AUTOMATED: CPT | Performed by: INTERNAL MEDICINE

## 2024-10-13 PROCEDURE — 250N000011 HC RX IP 250 OP 636: Performed by: HOSPITALIST

## 2024-10-13 RX ORDER — MAGNESIUM OXIDE 400 MG/1
400 TABLET ORAL EVERY 4 HOURS
Status: COMPLETED | OUTPATIENT
Start: 2024-10-13 | End: 2024-10-13

## 2024-10-13 RX ORDER — METOPROLOL TARTRATE 25 MG/1
25 TABLET, FILM COATED ORAL 2 TIMES DAILY
Status: DISCONTINUED | OUTPATIENT
Start: 2024-10-13 | End: 2024-10-15 | Stop reason: HOSPADM

## 2024-10-13 RX ORDER — FUROSEMIDE 10 MG/ML
20 INJECTION INTRAMUSCULAR; INTRAVENOUS ONCE
Status: COMPLETED | OUTPATIENT
Start: 2024-10-13 | End: 2024-10-13

## 2024-10-13 RX ADMIN — APIXABAN 2.5 MG: 2.5 TABLET, FILM COATED ORAL at 20:45

## 2024-10-13 RX ADMIN — FUROSEMIDE 20 MG: 10 INJECTION, SOLUTION INTRAMUSCULAR; INTRAVENOUS at 12:08

## 2024-10-13 RX ADMIN — APIXABAN 2.5 MG: 2.5 TABLET, FILM COATED ORAL at 07:55

## 2024-10-13 RX ADMIN — METOPROLOL TARTRATE 12.5 MG: 25 TABLET, FILM COATED ORAL at 07:55

## 2024-10-13 RX ADMIN — Medication 400 MG: at 11:36

## 2024-10-13 RX ADMIN — POLYETHYLENE GLYCOL 3350 17 G: 17 POWDER, FOR SOLUTION ORAL at 09:05

## 2024-10-13 RX ADMIN — SENNOSIDES AND DOCUSATE SODIUM 2 TABLET: 50; 8.6 TABLET ORAL at 07:55

## 2024-10-13 RX ADMIN — METOPROLOL TARTRATE 25 MG: 25 TABLET, FILM COATED ORAL at 19:13

## 2024-10-13 RX ADMIN — ACETAMINOPHEN 650 MG: 325 TABLET ORAL at 07:55

## 2024-10-13 RX ADMIN — ACETAMINOPHEN 650 MG: 325 TABLET ORAL at 12:12

## 2024-10-13 ASSESSMENT — ACTIVITIES OF DAILY LIVING (ADL)
ADLS_ACUITY_SCORE: 56
ADLS_ACUITY_SCORE: 52
ADLS_ACUITY_SCORE: 56
ADLS_ACUITY_SCORE: 52
ADLS_ACUITY_SCORE: 58
ADLS_ACUITY_SCORE: 56
ADLS_ACUITY_SCORE: 58
ADLS_ACUITY_SCORE: 56
ADLS_ACUITY_SCORE: 56
ADLS_ACUITY_SCORE: 58
ADLS_ACUITY_SCORE: 52
ADLS_ACUITY_SCORE: 56
ADLS_ACUITY_SCORE: 58
ADLS_ACUITY_SCORE: 58
ADLS_ACUITY_SCORE: 56
ADLS_ACUITY_SCORE: 58
ADLS_ACUITY_SCORE: 56

## 2024-10-13 NOTE — PROGRESS NOTES
Care Management Follow Up    Length of Stay (days): 7    Expected Discharge Date: 10/14/2024     Concerns to be Addressed:  Discharge planning     Patient plan of care discussed at interdisciplinary rounds: Yes    Anticipated Discharge Disposition: Transitional Care           Anticipated Discharge Services: Therapy  Anticipated Discharge DME: None    Patient/family educated on Medicare website which has current facility and service quality ratings: yes  Education Provided on the Discharge Plan: N/A  Patient/Family in Agreement with the Plan: yes    Referrals Placed by CM/SW: Post Acute Facilities  Private pay costs discussed: Not applicable    Discussed  Partnership in Safe Discharge Planning  document with patient/family: No     Handoff Completed: No, handoff not indicated or clinically appropriate    Additional Information:  Reviewed chart.  Call placed to [facility] to update them as to patient's status and potential for discharge tomorrow.    Next Steps:  Confirm that [facility] will have a bed available for patient on discharge.  Determine transport.      EDMUND Ragland, [facility]    [phone]  [facility]

## 2024-10-13 NOTE — PLAN OF CARE
VSS except tachy. Tele reading A-Fib with RVR. Metoprolol dose increased starting this evening.  Up with 2 and miladys guerrero. Incontinent of bowel and bladder at times. Up to bathroom during the day, had 3 loose BM this shift. Fair appetite.  A&OX3 with intermittent confusion. Pain managed with tylenol.  Discharge pending insurance auth.

## 2024-10-13 NOTE — PROGRESS NOTES
[facility]    Medicine Progress Note - Hospitalist Service    Date of Admission:  10/6/2024    Interval History   Patient up and sitting for breakfast, son present during encounter.  Reports no pain.  No hypoxia, no PND, orthopnea.  No chest pain or palpitations.  Per telemetry A-fib, rate variable 80-1 28.    Assessment & Plan   Benita Hubbard is a 89 year old female with h/o  HTN, non ischemic Cardiomyopathy, CAD, neuropathy, history of UTI  was found on the floor in her independent apartment for an unknown period of time (found on welfare check) and was brought to the ER by EMS.  She was found to have left femoral neck fracture and admitted on 10/6/2024 for further management.      Following admission of 10/6 she had 2 subsequent RRT's.  First RRT early a.m on 10/7 for chest pain and shortness of breath.  Extensive evaluation as outlined below.  Noted to have refractory lactic acidosis throughout the day on 10/7.  Elevated troponin with cardiomyopathy seen by cardiology.  Hyperkalemia which is subsequently improved.  Treated for potential of UTI with antibiotics started.  Given significant amount of fluids.  Labs improved with resolution of lactic acidosis on 10/8 labs.     Lactic acidosis: (resolved)   - Patient with elevated lactic acid of 3.4 on admit 10/6   - Given fluids with reduction in lactate to 2.3 on 10/7 initially and then rebound increase 3.5>>4>>3.5   - Multiple reassessments throughout the day regarding lactic acidosis and follow up.   - 10/8 Lactate normalized now 1.2 >> Exact etiology not confirmed but treated for multiple contributing factors and better.   - LACTATE now normal on 10/8 >> left TONY per Ortho 10/8/2024.    Hyperkalemia: (resolved)   - At baseline potassium low 5 range and on ACE  - stopped ACE   - Lucero to alleviate urinary emptying, Lucero since removed, negative PVRs.  - monitor potassium   - Peak potassium 5.7 >> given fluids with lucero placed   -  10/8 potassium normal 4.6, 10/9/2024 at 4.8, recheck BMP in a.m K at  4/2     MAGNO mild  10/10/2024 mild bump in creatinine 1.16, before surgery 0.86.  Suspect related to contrast dye.  Patient urinating.  On antibiotics for UTI.  GUS Torres, voiding trial negative..  Encourage p.o. intake.    -CR on 12/13/2024 now normal.  BMP in a.m. given IV furosemide.     H/o nonischemic cardiomyopathy  Elevated troponin (type 2 MI secondary to supply/demand) tachycardia, acidosis,   Cardiology consult   10/8 new onset of afib, 10/9/2024 rate controlled. Spontaneous conversion to sinus 10/10/24; 10/12/24 back in a fib CVR  Fluid volume overload 10/11/24.  - LVEF was 40-45% in 2014.  Coronary angiogram showed normal coronaries.  LVEF subsequently normalized on ECHO 5/2014   - EKG with RBBB  - Troponin up [id number]  - Cardiology consult with elevated lactate and troponin   - 10/7 ECHO with EF 40-45% Flattened septum with RV pressure/volume overload. RV dilated. Moderate TR, mild ME  - with RV dilation PE excluded with CT   - Discussions with cardiology and no interventions planned. >> still most likely from stress demand    - 10/8 no further cardiac interventions planned monitor.   - 10/8 afib early this am. (Rate controlled) no interventions planned.  Left TONY 10/8/2024  -10/9/2024, Cardiology started on apixaban, DC Lovenox DVT PX per Ortho.    10/11/2024-on exam bilateral LE edema 2+, symmetrical.  Not noted on prior exams.  Patient denies PND, orthopnea.  Echo preop global hypokinesia, EF 40-45%.  Increased RV pressure, chest CT obtained negative PE.  Seen by cardiology.  Elevated troponins somewhat flat, declining, felt related to demand ischemia.  Patient subsequently had surgery, unclear if volume overload related to perioperative fluids.  Telemetry sinus, rate 70.  BNP checked 20.5K.  No hypoxia.  No chest pain, palpitations.  Given furosemide IV 40 mg x 2  reassess in a.m.  Potassium 4.5.  ADDENDUM; 10/11/2024 nurses  "state pulses ranging 113-131.  -110.  Known PAF on po metoprolol.  Orders placed for as needed IV metoprolol.  However on review of records at 16:44 pulse 72.  Close monitor.  10/12/2024, telemetry with A-fib, on apixaban.  On metoprolol.  Given BP on soft side will not be further aggressive regarding diuresis to allow use of B2.    10/13/2024: Telemetry continues A-fib, rate .  Fluid overload likely contributory however since initial furosemide IV 40 mg x 2 have been less aggressive with regards to diuresis patient generally asymptomatic and to maintain SBP to allow metoprolol for rate control..  Discussed with Cardiology Dr. Woodson, will initiate gentle diuresis with furosemide 20 mg IV one-time, continue metoprolol at 25 mg twice daily as BP permits.  Assess fluid status in a.m.  BMP in a.m., monitor telemetry.      Suspected fall, unclear etiology of fall  Left femoral neck fracture, acute, displaced  CT abdomen/pelvis displaced and angulated left femoral neck with nondisplaced fracture of the left pubic bone and left inferior and superior pubic rami.   Left hip hemiarthroplasty placed.  10/8/2024  - Unclear etiology of fall.  She was found on floor.    - Patient does not remember when she fell, and also the mechanism.   -  A bruise in her chin was noted 3 days ago by son, patient did not remember falling then as well.    - Uses walker mostly for mobility.  - Multiple imaging studies including left shoulder x-ray, chest x-ray, pelvis and hip x-ray,   - CT head and CT C-spine completed, noted left femoral neck fracture with displacement.    - 10/7 ortho consult   - 10/8 ortho surgery planned. Aware of pelvic fractures   - 10/8 left TONY 10/8/2024  -Lovenox DVT PX PE transition to apixaban in view of A-fib.  See above.  -PT recommends TCU.  -10/10/2024, see Ortho note. \"Continue Eliquis 2.5 mg BID for atrial fibrillation and DVT prophylaxis.              Hgb stable.              Mobilize with PT/OT. " "              WBAT LLE with use of assistive device.                No left hip adduction past midline for 6-8 weeks postop.              Continue current pain regimen.              Dressings: Keep intact. Change if >60% saturated or peeling off.               Follow-up: 2 weeks post-op with Dr. Julian team\"  TCU when able per cardiac status, A-fib rate control, fluid status.      Acute hypoxic respiratory failure  CT no PE, effusions,   Potential Right lung base pneumonia   Few small pulmonary nodules  - Difficult to evaluate oxygen levels with finger probe, ear probe and head probe.   - When able to get what appears to be good wave forms in mid 90%  - face mask as mouth breather  - CT with no obvious PE, small effusions Left greater than right   - on ceftriaxone for UTI vs PNA initially now on zosyn, will Rx 5 days total. Follow-up US.  - ECHO with 40-45% with holding lasix until after surgery with creatinine   -UCx Aerococcus urinae and sanguinicola; US pending  -Due to cephalosporin allergy transition to Augmentin 500 mg twice daily for 5 more doses total 5-day antibiotic course.  DC Zosyn.  Completed Augmentin.        Rhabdomyolysis  CK mildly elevated at  748.  Suspect related to fall.  -IV hydration and follow  - Repeat 393   -      UTI  Urine culture sent   - UA positive with catheter  - CT with mild diffuse bladder wall thickening and enhancement ? Cystitis.   - started on ceftriaxone. 10/7 >> prior Urine with aerococcus so changed to zosyn.   - 10/7 changed to zosyn >> await cultures and taper as able.   - 10/8 blood cultures NGTD  -Eating well, DC IV fluids especially in view of IV shortage.  -Due to cephalosporin allergy transition to Augmentin 500 mg twice daily for 5 more doses total 5-day antibiotic course.  DC Zosyn.  Completed Augmentin.    Diffuse Gallbladder wall thickening:   - on CT imaging abdomen   - Liver profile negative.   - 10/7 RUQ ultrasound with no cholecystitis    "   Thrombocytopenia  Mild thrombocytopenia, platelet count 112.  -Monitor  -Check iron panel, B12 and folate level.  -10/9/2024 platelet count 60,000, CBC in AM platelets 85K.  No active signs of bleeding..    Compression fractures.   T5 and L3 indeterminate on CT chest this admit (prior imaging suggest prior on imaging)   - 7/2019 xray of lumbar spine with mild L3 compression fracture   - 9/2021 CT chest with age indeterminate T4, mild T5 and T6   - 10/8 attempted to review with radiology but the films are not available here. (Can readdress post surgery>> Patient denies any back pain      Cognitive impairment  Acute encephalopathy   Son reports patient is forgetful and has short-term memory impairment.    - No formal diagnosis of dementia  - CT head no acute injury   - baseline short term memory problems now with lyrica, dilaudid and oxycodone on nights >> Stopped opioids, patient reports no pain issues.  - Would not resume lyrica after surgery (this was stopped in clinic a few weeks ago)     Constipation.  Likely multifactorial including decreased activities, decreased p.o. intake.  Minimal response to MiraLAX, senna S.  Abdominal exam benign.  No associated GI symptoms, specifically no nausea, emesis. suppository yesterday with 'substantial BM'  Monitor.    GOALS of care:    Patient was able to confirm herself she was DNR/DNI per prior Hospitalist. Family in agreement . .  Patient is full code.  10/8 updated family with labs and plans for surgery   -Postop, Son[s] present on encounter, updated.         Diet: Advance Diet as Tolerated: Regular Diet Adult    DVT Prophylaxis: Pneumatic Compression Devices  Torres Catheter: Not present  Lines: None     Cardiac Monitoring: ACTIVE order. Indication: Tachyarrhythmias, acute (48 hours)  Code Status: Full Code      Clinically Significant Risk Factors                   # Hypertension: Noted on problem list               # Financial/Environmental Concerns: none          Disposition Plan     Medically Ready for Discharge: 2-3 days pending fluid status and rate control     Shaylee Novoa MD  Hospitalist Service  [facility]  Securely message with IFTTT (more info)  Text page via Live[employer] Paging/Directory       Total time 50 minutes for today 10/13/2024 :  time consisted of the following, examination of patient, review of records including labs, imaging results, medications, interdisciplinary notes and completing documentation and orders.  Care Management included counseling/discussion with Patient and Son regarding current condition including A fib, fluid overload, TONY, discharge planning and Coordination of Care time with Nursing  and Specialists, ORtho and discussed with Cardiology Dr Woodson regarding care plan, management and surveillance.   ______________________________________________________________________  Physical Exam   Vital Signs: Temp: 96.9  F (36.1  C) Temp src: Axillary BP: 105/65 Pulse: 88   Resp: 18 SpO2: 94 % O2 Device: None (Room air)    Weight: 121 lbs 14.63 oz    General Appearance: NAD, more awake, calm, cooperative.  Up and sitting in chair.   Respiratory: Respirations nonlabored room air  Cardiovascular: Irregular, irregular, 1-2/4 bilateral LE edema, symmetrical, negative Homans, some serial improvement..  GI: + Soft nontender  MSK left hip ROM not tested, ambulation not tested  Neuro.  Gross motor tested, nonfocal,   Psych orientation to person and place, affect calm.      Data     I have personally reviewed the following data over the past 24 hrs:    8.4  \   12.0   / 167     139 105 33.9 (H) /  115 (H)   4.5 22 0.79 \       Imaging results reviewed over the past 24 hrs:

## 2024-10-13 NOTE — PLAN OF CARE
Goal Outcome Evaluation:      Plan of Care Reviewed With: patient    Overall Patient Progress: improvingOverall Patient Progress: improving        Date/Time: 10/12/24  [id number]    Trauma/Ortho/Medical (Choose one) Ortho    Diagnosis: L hip hemiarthroplasty  POD#:  5  Mental Status: A and O x 3. Disoriented to time.  Activity/dangle: A2 with GB and Kyra Franciscobruce. Stayed in bed the entire shift. Turned and repositioned  Diet: Regular  Pain: Denied pain  Torres/Voiding: External catheter  Tele/Restraints/Iso:Tele, sinus tachy  02/LDA: On RA. PIV SL L hand  D/C Date: Pending TCU placement.  Other Info: Takes pills crushed with apple sauce. Tachycardic. L hip dressing CDI. On Mg protocol

## 2024-10-13 NOTE — PLAN OF CARE
Trauma/Ortho/Medical (Choose one) Trauma     Diagnosis: L Hemiarthroplasty  POD#:3  Mental Status:4  Activity/dangle up with 2 and Kyra Steady  Diet: Reg  Pain: Tylenol  Torres/Voiding: BM today and voided in BR, does have incontinence.  Tele/Restraints/Iso:Tele-NSR  02/LDA: VIVIAN  D/C Date: TBD  Other Info: purewick used for incontinence overnight

## 2024-10-14 ENCOUNTER — APPOINTMENT (OUTPATIENT)
Dept: PHYSICAL THERAPY | Facility: CLINIC | Age: 89
DRG: 521 | End: 2024-10-14
Payer: MEDICARE

## 2024-10-14 LAB
ANION GAP SERPL CALCULATED.3IONS-SCNC: 11 MMOL/L (ref 7–15)
BUN SERPL-MCNC: 30 MG/DL (ref 8–23)
CALCIUM SERPL-MCNC: 9 MG/DL (ref 8.8–10.4)
CHLORIDE SERPL-SCNC: 103 MMOL/L (ref 98–107)
CREAT SERPL-MCNC: 0.79 MG/DL (ref 0.51–0.95)
EGFRCR SERPLBLD CKD-EPI 2021: 69 ML/MIN/1.73M2
ERYTHROCYTE [DISTWIDTH] IN BLOOD BY AUTOMATED COUNT: 16 % (ref 10–15)
GLUCOSE SERPL-MCNC: 108 MG/DL (ref 70–99)
HCO3 SERPL-SCNC: 27 MMOL/L (ref 22–29)
HCT VFR BLD AUTO: 39.4 % (ref 35–47)
HGB BLD-MCNC: 12.4 G/DL (ref 11.7–15.7)
MAGNESIUM SERPL-MCNC: 2 MG/DL (ref 1.7–2.3)
MCH RBC QN AUTO: 30.5 PG (ref 26.5–33)
MCHC RBC AUTO-ENTMCNC: 31.5 G/DL (ref 31.5–36.5)
MCV RBC AUTO: 97 FL (ref 78–100)
PLATELET # BLD AUTO: 218 10E3/UL (ref 150–450)
POTASSIUM SERPL-SCNC: 4.1 MMOL/L (ref 3.4–5.3)
RBC # BLD AUTO: 4.07 10E6/UL (ref 3.8–5.2)
SODIUM SERPL-SCNC: 141 MMOL/L (ref 135–145)
WBC # BLD AUTO: 7.8 10E3/UL (ref 4–11)

## 2024-10-14 PROCEDURE — 83735 ASSAY OF MAGNESIUM: CPT | Performed by: HOSPITALIST

## 2024-10-14 PROCEDURE — 85014 HEMATOCRIT: CPT | Performed by: HOSPITALIST

## 2024-10-14 PROCEDURE — 250N000013 HC RX MED GY IP 250 OP 250 PS 637: Performed by: STUDENT IN AN ORGANIZED HEALTH CARE EDUCATION/TRAINING PROGRAM

## 2024-10-14 PROCEDURE — 250N000013 HC RX MED GY IP 250 OP 250 PS 637: Performed by: NURSE PRACTITIONER

## 2024-10-14 PROCEDURE — 250N000013 HC RX MED GY IP 250 OP 250 PS 637: Performed by: HOSPITALIST

## 2024-10-14 PROCEDURE — 80048 BASIC METABOLIC PNL TOTAL CA: CPT | Performed by: HOSPITALIST

## 2024-10-14 PROCEDURE — 120N000001 HC R&B MED SURG/OB

## 2024-10-14 PROCEDURE — 99222 1ST HOSP IP/OBS MODERATE 55: CPT | Mod: 24 | Performed by: INTERNAL MEDICINE

## 2024-10-14 PROCEDURE — 97530 THERAPEUTIC ACTIVITIES: CPT | Mod: GP

## 2024-10-14 PROCEDURE — 97110 THERAPEUTIC EXERCISES: CPT | Mod: GP

## 2024-10-14 PROCEDURE — 99232 SBSQ HOSP IP/OBS MODERATE 35: CPT | Performed by: STUDENT IN AN ORGANIZED HEALTH CARE EDUCATION/TRAINING PROGRAM

## 2024-10-14 PROCEDURE — 250N000013 HC RX MED GY IP 250 OP 250 PS 637: Performed by: INTERNAL MEDICINE

## 2024-10-14 PROCEDURE — 36415 COLL VENOUS BLD VENIPUNCTURE: CPT | Performed by: HOSPITALIST

## 2024-10-14 RX ORDER — MAGNESIUM OXIDE 400 MG/1
400 TABLET ORAL EVERY 4 HOURS
Status: COMPLETED | OUTPATIENT
Start: 2024-10-14 | End: 2024-10-14

## 2024-10-14 RX ORDER — FUROSEMIDE 20 MG/1
20 TABLET ORAL DAILY
Status: DISCONTINUED | OUTPATIENT
Start: 2024-10-14 | End: 2024-10-15 | Stop reason: HOSPADM

## 2024-10-14 RX ADMIN — Medication 400 MG: at 10:57

## 2024-10-14 RX ADMIN — Medication 400 MG: at 15:55

## 2024-10-14 RX ADMIN — APIXABAN 2.5 MG: 2.5 TABLET, FILM COATED ORAL at 09:09

## 2024-10-14 RX ADMIN — SENNOSIDES AND DOCUSATE SODIUM 2 TABLET: 50; 8.6 TABLET ORAL at 09:09

## 2024-10-14 RX ADMIN — FUROSEMIDE 20 MG: 20 TABLET ORAL at 17:52

## 2024-10-14 RX ADMIN — ACETAMINOPHEN 650 MG: 325 TABLET ORAL at 17:52

## 2024-10-14 RX ADMIN — METOPROLOL TARTRATE 25 MG: 25 TABLET, FILM COATED ORAL at 20:12

## 2024-10-14 RX ADMIN — METOPROLOL TARTRATE 25 MG: 25 TABLET, FILM COATED ORAL at 09:09

## 2024-10-14 RX ADMIN — APIXABAN 2.5 MG: 2.5 TABLET, FILM COATED ORAL at 20:12

## 2024-10-14 ASSESSMENT — ACTIVITIES OF DAILY LIVING (ADL)
ADLS_ACUITY_SCORE: 53
ADLS_ACUITY_SCORE: 56
ADLS_ACUITY_SCORE: 53
ADLS_ACUITY_SCORE: 56
ADLS_ACUITY_SCORE: 53
ADLS_ACUITY_SCORE: 56
ADLS_ACUITY_SCORE: 53
ADLS_ACUITY_SCORE: 56
ADLS_ACUITY_SCORE: 53

## 2024-10-14 NOTE — CONSULTS
[facility]    Cardiology Consultation     Date of Admission:  10/6/2024    Assessment and plan    1.  Non-STEMI type II demand MI  2.  New onset atrial fibrillation, paroxysmal in nature.  On rate control and anticoagulation  3.  Left hip fracture with left hemiarthroplasty October 8  4.  Possible stress cardiomyopathy given current presentation.        Recommendations    1.  Non-STEMI: Agree with my colleagues notes from last week.  This will be managed conservatively.    2.  Would place her on maintenance Lasix of 20 mg p.o. daily.  Has responded well to her intravenous dosages in the past.  Can switch to p.o. at this time.    3.  Paroxysmal atrial fibrillation: Continue with the 25 mg p.o. twice daily of metoprolol as you are doing with supplementation.  She is on Eliquis and would continue with lower dose of 2.5 mg twice daily.    Cardiology will be available peripherally.  Agree with her current management of atrial fibrillation with scheduled dosing of metoprolol with periodic IV supplementation.  I suspect is she continues with her recovery from her hip fracture and other medical issues that this requirement will lessen.  Would follow-up with plan for monitor as cardiology inpatient team had recommended last week.          Adalgisa Rosario MD, MD          History of present illness    Please see full consult note from cardiology last week.  This is a reconsult from issues overnight with atrial fibrillation and RVR.  Presently patient is resting comfortably and son is at bedside.  She continues to report that shortness of breath has improved.  Has been periodically been given intravenous Lasix with good response.        Primary Care Physician   Benjamin Chambers    Reason for Consult           Patient Active Problem List   Diagnosis    NSTEMI (non-ST elevated myocardial infarction) (H)    Hypertension    Cardiomyopathy due to hypertension (H)    Cardiomyopathy,  "nonischemic (H)    Closed head injury, initial encounter    Closed fracture of sternum, unspecified portion of sternum, initial encounter    Closed wedge compression fracture of T4 vertebra, initial encounter (H)    MAGNO (acute kidney injury) (H)    Hip fracture, left, closed, initial encounter (H)    Closed displaced fracture of left femoral neck (H)    Non-traumatic rhabdomyolysis       Past Medical History   I have reviewed this patient's medical history and updated it with pertinent information if needed.   Past Medical History:   Diagnosis Date    Cardiomyopathy due to hypertension (H)     Cardiomyopathy, nonischemic (H)     Coronary artery disease     Diverticulitis of colon     Hypertension     Neuropathy     Osteoporosis     Palmar plantar dysesthesia     Small fiber neuropathy     UTI (lower urinary tract infection)        Past Surgical History   I have reviewed this patient's surgical history and updated it with pertinent information if needed.  Past Surgical History:   Procedure Laterality Date    CORONARY ANGIOGRAPHY ADULT ORDER  4/14/14    normal Coronary arteries    HYSTERECTOMY, PAP NO LONGER INDICATED      ovaries still in place    OPEN REDUCTION INTERNAL FIXATION HIP BIPOLAR Left 10/8/2024    Procedure: HIP HEMIARTHROPLASTY;  Surgeon: Titi Julian MD;  Location:  OR    ORTHOPEDIC SURGERY      Left hand \"hard lumps\" excised    SEPTOPLASTY      SURGICAL HISTORY OF -       vein stripping right leg       Prior to Admission Medications   Prior to Admission Medications   Prescriptions Last Dose Informant Patient Reported? Taking?   acetaminophen (TYLENOL) 325 MG tablet  at PRN Son No No   Sig: Take 2 tablets (650 mg) by mouth 2 times daily as needed for mild pain ; maximum 4 grams/day of acetaminophen from all sources.   alendronate (FOSAMAX) 70 MG tablet 9/28/2024 Son Yes Yes   Sig: Take 70 mg by mouth every 7 days Takes on Saturdays   calcium-vitamin D (CALTRATE) 600-400 MG-UNIT per tablet " 10/4/2024 Son Yes Yes   Sig: Take 1 tablet by mouth daily   lisinopril (PRINIVIL,ZESTRIL) 5 MG tablet 10/4/2024 Son No Yes   Sig: Take 1 tablet (5 mg) by mouth daily   metoprolol succinate ER (TOPROL-XL) 25 MG 24 hr tablet 10/4/2024 Son No Yes   Sig: Take 1 tablet (25 mg) by mouth every evening   mometasone (NASONEX) 50 MCG/ACT nasal spray  at PRN Son Yes Yes   Sig: Spray 2 sprays into both nostrils daily as needed   pregabalin (LYRICA) 50 MG capsule 10/4/2024 Son Yes Yes   Sig: Take 50 mg by mouth every evening      Facility-Administered Medications: None     Current Facility-Administered Medications   Medication Dose Route Frequency Provider Last Rate Last Admin    acetaminophen (TYLENOL) tablet 650 mg  650 mg Oral Q4H PRN Vimal Hammer MD   650 mg at 10/13/24 1212    Or    acetaminophen (TYLENOL) Suppository 650 mg  650 mg Rectal Q4H PRN Vimal Hammer MD   650 mg at 10/09/24 0115    apixaban ANTICOAGULANT (ELIQUIS) tablet 2.5 mg  2.5 mg Oral BID Lisa Velasquez CNP   2.5 mg at 10/14/24 0909    benzocaine-menthol (CHLORASEPTIC) 6-10 MG lozenge 1 lozenge  1 lozenge Buccal Q1H PRN Padmini Joyce PA-C        bisacodyl (DULCOLAX) suppository 10 mg  10 mg Rectal Daily PRN Padmini Joyce PA-C        calcium carbonate (TUMS) chewable tablet 1,000 mg  1,000 mg Oral 4x Daily PRN Karma Emery MD        glucose gel 15-30 g  15-30 g Oral Q15 Min PRN Kathryn Stafford MD        Or    dextrose 50 % injection 25-50 mL  25-50 mL Intravenous Q15 Min PRN Kathryn Stafford MD        Or    glucagon injection 1 mg  1 mg Subcutaneous Q15 Min PRN Kathryn Stafford MD        fluticasone (FLONASE) 50 MCG/ACT spray 2 spray  2 spray Both Nostrils Daily PRN Karma Emery MD        HYDROmorphone (DILAUDID) injection 0.2 mg  0.2 mg Intravenous Q2H PRN Padmini Joyce PA-C        Or    HYDROmorphone (DILAUDID) injection 0.4 mg  0.4 mg Intravenous Q2H PRN Padmini Joyce PA-C        hydrOXYzine HCl (ATARAX)  tablet 10 mg  10 mg Oral Q6H PRN Padmini Joyce PA-C        lidocaine (LMX4) cream   Topical Q1H PRN Padmini Joyce PA-C        lidocaine 1 % 0.1-1 mL  0.1-1 mL Other Q1H PRN Padmini Joyce PA-C        magnesium hydroxide (MILK OF MAGNESIA) suspension 30 mL  30 mL Oral Daily PRN Padmini Joyce PA-C        methocarbamol (ROBAXIN) half-tab 250 mg  250 mg Oral 4x Daily PRN Nunu Jones PA-C        metoprolol (LOPRESSOR) injection 2.5 mg  2.5 mg Intravenous Q4H PRN Shaylee Novoa MD        metoprolol tartrate (LOPRESSOR) tablet 25 mg  25 mg Oral BID Shaylee Novoa MD   25 mg at 10/14/24 0909    naloxone (NARCAN) injection 0.2 mg  0.2 mg Intravenous Q2 Min PRN Nunu Jones PA-C        Or    naloxone (NARCAN) injection 0.4 mg  0.4 mg Intravenous Q2 Min PRN Nunu Jones PA-C        Or    naloxone (NARCAN) injection 0.2 mg  0.2 mg Intramuscular Q2 Min PRN Nunu Jones PA-C        Or    naloxone (NARCAN) injection 0.4 mg  0.4 mg Intramuscular Q2 Min PRN Nunu Jones PA-C        ondansetron (ZOFRAN ODT) ODT tab 4 mg  4 mg Oral Q6H PRN Padmini Joyce PA-C        Or    ondansetron (ZOFRAN) injection 4 mg  4 mg Intravenous Q6H PRN Padmini Joyce PA-C        oxyCODONE (ROXICODONE) tablet 5 mg  5 mg Oral Q4H PRN Padmini Joyce PA-C        Or    oxyCODONE (ROXICODONE) tablet 10 mg  10 mg Oral Q4H PRN Padmini Joyce PA-C        polyethylene glycol (MIRALAX) Packet 17 g  17 g Oral Daily Padmini Joyce PA-C   17 g at 10/13/24 0905    prochlorperazine (COMPAZINE) injection 5 mg  5 mg Intravenous Q6H PRN Styrlund, Padmini, PA-C        Or    prochlorperazine (COMPAZINE) tablet 5 mg  5 mg Oral Q6H PRN Padmini Joyce PA-C        senna-docusate (SENOKOT-S/PERICOLACE) 8.6-50 MG per tablet 1 tablet  1 tablet Oral BID PRN Karma Emery MD   1 tablet at 10/09/24 0831    Or    senna-docusate (SENOKOT-S/PERICOLACE) 8.6-50 MG per tablet 2 tablet  2 tablet  Oral BID PRN Karma Emery MD        senna-docusate (SENOKOT-S/PERICOLACE) 8.6-50 MG per tablet 2 tablet  2 tablet Oral BID Shaylee Novoa MD   2 tablet at 10/14/24 0909    sodium chloride (PF) 0.9% PF flush 3 mL  3 mL Intracatheter Q8H Padmini hutchison PA-C   3 mL at 10/13/24 2121    sodium chloride (PF) 0.9% PF flush 3 mL  3 mL Intracatheter q1 min prn Padmini Joyce PA-C         Current Facility-Administered Medications   Medication Dose Route Frequency Provider Last Rate Last Admin    acetaminophen (TYLENOL) tablet 650 mg  650 mg Oral Q4H PRN Vimal Hammer MD   650 mg at 10/13/24 1212    Or    acetaminophen (TYLENOL) Suppository 650 mg  650 mg Rectal Q4H PRN Vimal Hammer MD   650 mg at 10/09/24 0115    apixaban ANTICOAGULANT (ELIQUIS) tablet 2.5 mg  2.5 mg Oral BID Lisa Velasquez CNP   2.5 mg at 10/14/24 0909    benzocaine-menthol (CHLORASEPTIC) 6-10 MG lozenge 1 lozenge  1 lozenge Buccal Q1H PRN Padmini Joyce PA-C        bisacodyl (DULCOLAX) suppository 10 mg  10 mg Rectal Daily PRN Padmini Joyce PAAneudyC        calcium carbonate (TUMS) chewable tablet 1,000 mg  1,000 mg Oral 4x Daily PRN Karma Emery MD        glucose gel 15-30 g  15-30 g Oral Q15 Min PRN Kathryn Stafford MD        Or    dextrose 50 % injection 25-50 mL  25-50 mL Intravenous Q15 Min PRN Kathryn Stafford MD        Or    glucagon injection 1 mg  1 mg Subcutaneous Q15 Min PRN Kathryn Stafford MD        fluticasone (FLONASE) 50 MCG/ACT spray 2 spray  2 spray Both Nostrils Daily PRN Karma Emery MD        HYDROmorphone (DILAUDID) injection 0.2 mg  0.2 mg Intravenous Q2H PRN Padmini Joyce PA-C        Or    HYDROmorphone (DILAUDID) injection 0.4 mg  0.4 mg Intravenous Q2H PRN Padmini Joyce PA-C        hydrOXYzine HCl (ATARAX) tablet 10 mg  10 mg Oral Q6H PRN Padmini Joyce PA-C        lidocaine (LMX4) cream   Topical Q1H PRN Padmini Joyce PA-C        lidocaine 1 % 0.1-1 mL  0.1-1  mL Other Q1H PRN Padmini Joyce PA-C        magnesium hydroxide (MILK OF MAGNESIA) suspension 30 mL  30 mL Oral Daily PRN Padmini Joyce PA-C        methocarbamol (ROBAXIN) half-tab 250 mg  250 mg Oral 4x Daily PRN Nunu Jones PA-C        metoprolol (LOPRESSOR) injection 2.5 mg  2.5 mg Intravenous Q4H PRN Shaylee Novoa MD        metoprolol tartrate (LOPRESSOR) tablet 25 mg  25 mg Oral BID Shaylee Novoa MD   25 mg at 10/14/24 0909    naloxone (NARCAN) injection 0.2 mg  0.2 mg Intravenous Q2 Min PRN Nunu Jones PA-C        Or    naloxone (NARCAN) injection 0.4 mg  0.4 mg Intravenous Q2 Min PRN Nunu Jones PA-C        Or    naloxone (NARCAN) injection 0.2 mg  0.2 mg Intramuscular Q2 Min PRN Nunu Jones PA-C        Or    naloxone (NARCAN) injection 0.4 mg  0.4 mg Intramuscular Q2 Min PRN Nunu Jones PA-C        ondansetron (ZOFRAN ODT) ODT tab 4 mg  4 mg Oral Q6H PRN Padmini Joyce PA-C        Or    ondansetron (ZOFRAN) injection 4 mg  4 mg Intravenous Q6H PRN Padmini Joyce PA-C        oxyCODONE (ROXICODONE) tablet 5 mg  5 mg Oral Q4H PRN Padmini Joyce PA-C        Or    oxyCODONE (ROXICODONE) tablet 10 mg  10 mg Oral Q4H PRN Padmini Joyce PA-C        polyethylene glycol (MIRALAX) Packet 17 g  17 g Oral Daily Padmini Joyce PA-C   17 g at 10/13/24 0905    prochlorperazine (COMPAZINE) injection 5 mg  5 mg Intravenous Q6H PRN Padmini Joyce PA-C        Or    prochlorperazine (COMPAZINE) tablet 5 mg  5 mg Oral Q6H PRN Padmini Joyce PA-C        senna-docusate (SENOKOT-S/PERICOLACE) 8.6-50 MG per tablet 1 tablet  1 tablet Oral BID PRN Kharel, Tirtha R, MD   1 tablet at 10/09/24 0831    Or    senna-docusate (SENOKOT-S/PERICOLACE) 8.6-50 MG per tablet 2 tablet  2 tablet Oral BID Karma Mckenzie MD        senna-docusate (SENOKOT-S/PERICOLACE) 8.6-50 MG per tablet 2 tablet  2 tablet Oral BID Shaylee Novoa MD   2 tablet at  "10/14/24 0909    sodium chloride (PF) 0.9% PF flush 3 mL  3 mL [facility]acatheter [facility]H [facility] [place] PA-C   3 mL at 10/13/24 2121    sodium chloride (PF) 0.9% PF flush 3 mL  3 mL Intracatheter q1 min prn [facility] [place] PA-C         Allergies   Allergies   Allergen Reactions    Cephalexin Other (See Comments)     Dizzy and chest tightness    Ciprofloxacin Other (See Comments)     Neuropathy bottom of feet      Nitrofurantoin Other (See Comments)     Neuropathy bottom of feet    Ofloxacin GI Disturbance     Numbness    Sulfa Antibiotics      Coated tongue       Social History    reports that she quit smoking about 55 years ago. She has never used smokeless tobacco. She reports current alcohol use. She reports that she does not use drugs.    Family History   Family History   Problem Relation Age of Onset    Cerebrovascular Disease Mother     Cancer - colorectal Father     Heart Disease No family hx of        Review of Systems   The comprehensive 10 point Review of Systems is negative other than noted in the HPI or here.     Physical Exam   Vital Signs with Ranges  Temp:  [97.4  F (36.3  C)-97.6  F (36.4  C)] 97.4  F (36.3  C)  Pulse:  [] 99  Resp:  [18] 18  BP: (105-126)/(65-90) 126/90  SpO2:  [93 %-97 %] 97 %  Wt Readings from Last 4 Encounters:   10/14/24 52.9 kg (116 lb 10 oz)   09/20/21 57.3 kg (126 lb 6.4 oz)   10/03/17 63.6 kg (140 lb 3.2 oz)   10/05/16 65.8 kg (145 lb)     I/O last 3 completed shifts:  In: 240 [P.O.:240]  Out: -       Vitals: BP (!) 126/90 (BP Location: Right arm)   Pulse 99   Temp 97.4  F (36.3  C) (Oral)   Resp 18   Ht 1.651 m (5' 5\")   Wt 52.9 kg (116 lb 10 oz)   SpO2 97%   BMI 19.41 kg/m          No lab results found in last 7 days.    Invalid input(s): \"TROPONINIES\"    Recent Labs   Lab 10/14/24  0910 10/13/24  0811 10/12/24  1134 10/12/24  1034 10/07/24  1455 10/07/24  1313   WBC 7.8 8.4  --  9.4   < >  --    HGB 12.4 12.0  --  12.1   < >  --    MCV 97 95  --  96   < >  --  " "   167  --  145*   < >  --     139 142  --    < >  --    POTASSIUM 4.1 4.5 4.2  --    < > 5.3   CHLORIDE 103 105 106  --    < >  --    CO2 27 22 23  --    < >  --    BUN 30.0* 33.9* 39.7*  --    < >  --    CR 0.79 0.79 1.03*  --    < >  --    GFRESTIMATED 69 69 50*  --    < >  --    ANIONGAP 11 12 13  --    < >  --    SHORTY 9.0 8.9 8.8  --    < >  --    * 115* 123*  --    < >  --    ALBUMIN  --   --   --   --   --  3.5   PROTTOTAL  --   --   --   --   --  6.0*   BILITOTAL  --   --   --   --   --  0.6   ALKPHOS  --   --   --   --   --  57   ALT  --   --   --   --   --  28   AST  --   --   --   --   --  43    < > = values in this interval not displayed.     Recent Labs   Lab Test 09/29/17  0902   CHOL 193   HDL 64   *   TRIG 70     Recent Labs   Lab 10/14/24  0910 10/13/24  0811 10/12/24  1034   WBC 7.8 8.4 9.4   HGB 12.4 12.0 12.1   HCT 39.4 37.0 38.2   MCV 97 95 96    167 145*     Recent Labs   Lab 10/08/24  0630 10/07/24  2257 10/07/24  1138   PHV 7.29* 7.25* 7.26*   PO2V 32 26 22*   PCO2V 52* 58* 56*   HCO3V 25 25 25     Recent Labs   Lab 10/11/24  0741   NTBNPI 25,058*     No results for input(s): \"DD\" in the last 168 hours.  No results for input(s): \"SED\", \"CRP\" in the last 168 hours.  Recent Labs   Lab 10/14/24  0910 10/13/24  0811 10/12/24  1034    167 145*     No results for input(s): \"TSH\" in the last 168 hours.  No results for input(s): \"COLOR\", \"APPEARANCE\", \"URINEGLC\", \"URINEBILI\", \"URINEKETONE\", \"SG\", \"UBLD\", \"URINEPH\", \"PROTEIN\", \"UROBILINOGEN\", \"NITRITE\", \"LEUKEST\", \"RBCU\", \"WBCU\" in the last 168 hours.    Imaging:  No results found for this or any previous visit (from the past 48 hour(s)).    Echo:  No results found for this or any previous visit (from the past 4320 hour(s)).    Clinically Significant Risk Factors                   # Hypertension: Noted on problem list               # Financial/Environmental Concerns: none                    "

## 2024-10-14 NOTE — PROGRESS NOTES
[facility]    Medicine Progress Note - Hospitalist Service    Date of Admission:  10/6/2024    Assessment & Plan   Benita Hubbard is a 89 year old female with h/o  HTN, non ischemic Cardiomyopathy, CAD, neuropathy, history of UTI  was found on the floor in her independent apartment for an unknown period of time (found on welfare check) and was brought to the ER by EMS.  She was found to have left femoral neck fracture and admitted on 10/6/2024 for further management.       Following admission of 10/6 she had 2 subsequent RRT's.  First RRT early a.m on 10/7 for chest pain and shortness of breath.  Extensive evaluation as outlined below.  Noted to have refractory lactic acidosis throughout the day on 10/7.  Elevated troponin with cardiomyopathy seen by cardiology.  Hyperkalemia which is subsequently improved.  Treated for potential of UTI with antibiotics started.  Given significant amount of fluids.  Labs improved with resolution of lactic acidosis on 10/8 labs.      Lactic acidosis: (resolved)   -Hyperkalemia: (resolved)   - At baseline potassium low 5 range and on ACE  - stopped ACE   - Lucero to alleviate urinary emptying, Lucero since removed, negative PVRs.  - monitor potassium   - Peak potassium 5.7 >> given fluids with lucero placed   - 10/8 potassium normal 4.6, 10/9/2024 at 4.8, recheck BMP in a.m K at  4/2      MAGNO mild  10/10/2024 mild bump in creatinine 1.16, before surgery 0.86.  Suspect related to contrast dye.  Patient urinating.  On antibiotics for UTI.  DC Lucero, voiding trial negative..  Encourage p.o. intake.    -CR on 12/13/2024 now normal.  BMP in a.m. given IV furosemide.      H/o nonischemic cardiomyopathy  Elevated troponin (type 2 MI secondary to supply/demand) tachycardia, acidosis,   Cardiology consult   10/8 new onset of afib, 10/9/2024 rate controlled. Spontaneous conversion to sinus 10/10/24; 10/12/24 back in a fib CVR  Fluid volume overload 10/11/24.  - LVEF  was 40-45% in 2014.  Coronary angiogram showed normal coronaries.  LVEF subsequently normalized on ECHO 5/2014   - EKG with RBBB  - Troponin up [id number]  - Cardiology consult with elevated lactate and troponin   - 10/7 ECHO with EF 40-45% Flattened septum with RV pressure/volume overload. RV dilated. Moderate TR, mild ND  - with RV dilation PE excluded with CT   - Discussions with cardiology and no interventions planned. >> still most likely from stress demand    - 10/8 no further cardiac interventions planned monitor.   - 10/8 afib early this am. (Rate controlled) no interventions planned.  Left TONY 10/8/2024  -10/9/2024, Cardiology started on apixaban, DC Lovenox DVT PX per Ortho.    10/11/2024-on exam bilateral LE edema 2+, symmetrical.  Not noted on prior exams.  Patient denies PND, orthopnea.  Echo preop global hypokinesia, EF 40-45%.  Increased RV pressure, chest CT obtained negative PE.  Seen by cardiology.  Elevated troponins somewhat flat, declining, felt related to demand ischemia.  Patient subsequently had surgery, unclear if volume overload related to perioperative fluids.  Telemetry sinus, rate 70.  BNP checked 20.5K.  No hypoxia.  No chest pain, palpitations.  Given furosemide IV 40 mg x 2  reassess in a.m.  Potassium 4.5.  ADDENDUM; 10/11/2024 nurses state pulses ranging 113-131.  -110.  Known PAF on po metoprolol.  Orders placed for as needed IV metoprolol.  However on review of records at 16:44 pulse 72.  Close monitor.  10/12/2024, telemetry with A-fib, on apixaban.  On metoprolol.  Given BP on soft side will not be further aggressive regarding diuresis to allow use of B2.    10/13/2024: Telemetry continues A-fib, rate .  Fluid overload likely contributory however since initial furosemide IV 40 mg x 2 have been less aggressive with regards to diuresis patient generally asymptomatic and to maintain SBP to allow metoprolol for rate control..  Discussed with Cardiology Dr. Woodson,  "will initiate gentle diuresis with furosemide 20 mg IV one-time, continue metoprolol at 25 mg twice daily as BP permits.  Assess fluid status in a.m.  BMP in a.m., monitor telemetry.    10/14/24  Cardiology consult appreciated  Continue lasix 20mg and continue eliquis         Suspected fall, unclear etiology of fall  Left femoral neck fracture, acute, displaced  CT abdomen/pelvis displaced and angulated left femoral neck with nondisplaced fracture of the left pubic bone and left inferior and superior pubic rami.   Left hip hemiarthroplasty placed.  10/8/2024  - Unclear etiology of fall.  She was found on floor.    - Patient does not remember when she fell, and also the mechanism.   -  A bruise in her chin was noted 3 days ago by son, patient did not remember falling then as well.    - Uses walker mostly for mobility.  - Multiple imaging studies including left shoulder x-ray, chest x-ray, pelvis and hip x-ray,   - CT head and CT C-spine completed, noted left femoral neck fracture with displacement.    - 10/7 ortho consult   - 10/8 ortho surgery planned. Aware of pelvic fractures   - 10/8 left TONY 10/8/2024  -Lovenox DVT PX PE transition to apixaban in view of A-fib.  See above.  -PT recommends TCU.  -10/10/2024, see Ortho note. \"Continue Eliquis 2.5 mg BID for atrial fibrillation and DVT prophylaxis.              Hgb stable.              Mobilize with PT/OT.               WBAT LLE with use of assistive device.                No left hip adduction past midline for 6-8 weeks postop.              Continue current pain regimen.              Dressings: Keep intact. Change if >60% saturated or peeling off.               Follow-up: 2 weeks post-op with Dr. Julian team\"  TCU when able per cardiac status, A-fib rate control, fluid status.        Acute hypoxic respiratory failure  CT no PE, effusions,   Potential Right lung base pneumonia   Few small pulmonary nodules  - Difficult to evaluate oxygen levels with finger probe, " ear probe and head probe.   - When able to get what appears to be good wave forms in mid 90%  - face mask as mouth breather  - CT with no obvious PE, small effusions Left greater than right   - on ceftriaxone for UTI vs PNA initially now on zosyn, will Rx 5 days total. Follow-up US.  - ECHO with 40-45% with holding lasix until after surgery with creatinine   -UCx Aerococcus urinae and sanguinicola; US pending  -Due to cephalosporin allergy transition to Augmentin 500 mg twice daily for 5 more doses total 5-day antibiotic course.  DC Zosyn.  Completed Augmentin.         Rhabdomyolysis  CK mildly elevated at  748.  Suspect related to fall.  -IV hydration and follow  - Repeat 393   -       UTI  Urine culture sent   - UA positive with catheter  - CT with mild diffuse bladder wall thickening and enhancement ? Cystitis.   - started on ceftriaxone. 10/7 >> prior Urine with aerococcus so changed to zosyn.   - 10/7 changed to zosyn >> await cultures and taper as able.   - 10/8 blood cultures NGTD  -Eating well, DC IV fluids especially in view of IV shortage.  -Due to cephalosporin allergy transition to Augmentin 500 mg twice daily for 5 more doses total 5-day antibiotic course.  DC Zosyn.  Completed Augmentin.     Diffuse Gallbladder wall thickening:   - on CT imaging abdomen   - Liver profile negative.   - 10/7 RUQ ultrasound with no cholecystitis      Thrombocytopenia  Mild thrombocytopenia, platelet count 112.  -Monitor  -Check iron panel, B12 and folate level.  -10/9/2024 platelet count 60,000, CBC in AM platelets 85K.  No active signs of bleeding..     Compression fractures.   T5 and L3 indeterminate on CT chest this admit (prior imaging suggest prior on imaging)   - 7/2019 xray of lumbar spine with mild L3 compression fracture   - 9/2021 CT chest with age indeterminate T4, mild T5 and T6   - 10/8 attempted to review with radiology but the films are not available here. (Can readdress post surgery>> Patient denies  any back pain      Cognitive impairment  Acute encephalopathy   Son reports patient is forgetful and has short-term memory impairment.    - No formal diagnosis of dementia  - CT head no acute injury   - baseline short term memory problems now with lyrica, dilaudid and oxycodone on nights >> Stopped opioids, patient reports no pain issues.  - Would not resume lyrica after surgery (this was stopped in clinic a few weeks ago)      Constipation.  Likely multifactorial including decreased activities, decreased p.o. intake.  Minimal response to MiraLAX, senna S.  Abdominal exam benign.  No associated GI symptoms, specifically no nausea, emesis. suppository yesterday with 'substantial BM'  Monitor.    # Family communication  Updated Son         Diet: Advance Diet as Tolerated: Regular Diet Adult    DVT Prophylaxis: DOAC  Torres Catheter: Not present  Lines: None     Cardiac Monitoring: ACTIVE order. Indication: Tachyarrhythmias, acute (48 hours)  Code Status: Full Code      Clinically Significant Risk Factors                   # Hypertension: Noted on problem list               # Financial/Environmental Concerns: none         Disposition Plan     Medically Ready for Discharge: Anticipated Tomorrow             Erik Belcher MD  Hospitalist Service  [facility]  Securely message with [employer] (more info)  Text page via [employer] Paging/Directory   ______________________________________________________________________    Interval History   {Patient seen and examined at bedside  Denies chest pain  Denies shortness of breath     Physical Exam   Vital Signs: Temp: 97.1  F (36.2  C) Temp src: Oral BP: 99/68 Pulse: 118   Resp: 18 SpO2: 100 % O2 Device: None (Room air) Oxygen Delivery: 2 LPM  Weight: 116 lbs 9.97 oz    Physical Exam  Cardiovascular:      Rate and Rhythm: Normal rate and regular rhythm.      Heart sounds: Normal heart sounds.   Pulmonary:      Effort: Pulmonary effort is normal. No  respiratory distress.   Abdominal:      General: There is no distension.      Palpations: Abdomen is soft.      Tenderness: There is no abdominal tenderness.          Medical Decision Making       45 MINUTES SPENT BY ME on the date of service doing chart review, history, exam, documentation & further activities per the note.      Data     I have personally reviewed the following data over the past 24 hrs:    7.8  \   12.4   / 218     141 103 30.0 (H) /  108 (H)   4.1 27 0.79 \       Imaging results reviewed over the past 24 hrs:   No results found for this or any previous visit (from the past 24 hour(s)).

## 2024-10-14 NOTE — PLAN OF CARE
Goal Outcome Evaluation:      Plan of Care Reviewed With: patient    Overall Patient Progress: improvingOverall Patient Progress: improving        Date/Time: 10/13/24 5408-9248     Trauma/Ortho/Medical (Choose one) Ortho     Diagnosis: L hip hemiarthroplasty  POD#:  5  Mental Status: A and O x 3. Disoriented to time.  Activity/dangle: A2 with GB and Kyra Ocasio. Turned and repositioned  Diet: Regular  Pain: Denied pain  Torres/Voiding: External catheter  Tele/Restraints/Iso:Tele, A FIB with RVR  02/LDA: On RA. PIV SL L lower forearm.  D/C Date: Pending TCU placement.  Other Info: Takes pills crushed with apple sauce. Tachycardic. L hip dressing CDI. On Mg protocol

## 2024-10-14 NOTE — PROGRESS NOTES
Date/Time 10/14/24 19:00  Diagnosis: Left Hip Steve Hemiarthroplasty  POD#: 6  Mental Status: A & Ox3  Activity/dangle: up with 2 and SS  Diet: Regular  Pain: Tylenol  Torres/Voiding: External Catheter, incontinent   Tele/Restraints/Iso:   02/LDA: On RA, PIV SL  D/C Date: 10/15/24 to TCU  Other Info: On Mg protocol Mg replaced today recheck in the AM, Pills crushed in apple sauce.

## 2024-10-14 NOTE — PROGRESS NOTES
CLINICAL NUTRITION SERVICES - BRIEF NOTE    Reviewing nutrition risks for length of stay. 75% intake + good diet/feeding tolerance documented per nursing flowsheets, ordering BID-TID meals, no notable wt loss in the last year, no pressure injuries documented. Looks like possible discharge today.    Wt Readings from Last 10 Encounters:   10/14/24 52.9 kg (116 lb 10 oz)   09/20/21 57.3 kg (126 lb 6.4 oz)   10/03/17 63.6 kg (140 lb 3.2 oz)   10/05/16 65.8 kg (145 lb)   09/21/15 65.3 kg (144 lb)   09/23/14 65.2 kg (143 lb 12.8 oz)   06/23/14 64.9 kg (143 lb)   04/15/14 64.2 kg (141 lb 8.6 oz)   08/08/09 70.8 kg (156 lb)     Per Care Everywhere:  49.9 kg (110 lb) 07/10/2024    MONITORING AND EVALUATION:  Will recheck nutrition risks in 7-10 days if remains admitted. Please consult for any nutrition interventions prior to this time if needed.    Stephanie Seals RD, LD  Clinical Dietitian - [facility]

## 2024-10-14 NOTE — PLAN OF CARE
Goal Outcome Evaluation:                      Diagnosis: L hip hemiarthroplasty  POD#:  6  Mental Status: A and O x 3. Disoriented to time.  Activity/dangle: A2 with GB and Kyra Ocasio. Turned and repositioned  Diet: Regular  Pain: Denied pain  Torres/Voiding: External catheter  Tele/Restraints/Iso:Tele, A FIB with RVR  02/LDA: On RA. Pt take IV out, will replace other IV   D/C Date: Pending TCU placement.  Other Info: Takes pills crushed with apple sauce.  L hip dressing CDI. On Mg protocol

## 2024-10-14 NOTE — PROGRESS NOTES
Care Management Follow Up    Length of Stay (days): 8    Expected Discharge Date: 10/14/2024     Concerns to be Addressed: discharge planning, care coordination/care conferences, all concerns addressed in this encounter     Patient plan of care discussed at interdisciplinary rounds: Yes    Anticipated Discharge Disposition: Transitional Care  Disposition Comments: TCU           Anticipated Discharge Services: None  Anticipated Discharge DME: None    Patient/family educated on Medicare website which has current facility and service quality ratings: yes  Education Provided on the Discharge Plan: Yes  Patient/Family in Agreement with the Plan: yes    Referrals Placed by CM/SW: Post Acute Facilities  Private pay costs discussed: Not applicable    Discussed  Partnership in Safe Discharge Planning  document with patient/family: Yes:      Handoff Completed: No, handoff not indicated or clinically appropriate    Additional Information:  Writer checked in with [facility] regarding bed and will contact tomorrow regarding medical readiness. Updated son Papito and patient at bedside. Confirmed she will need transport set up at discharge.     1530: Patient cleared for discharge. [facility] and [facility] can [facility] her [facility] (ride time is too late today). Confirmed that patient can transport via wheelchair with bedside nurse. Call to [facility] and wheelchair scheduled tomorrow, 10/15/24 between [phone] with oxygen available. Updated patient and son at bedside and they are in agreement. PAS completed.    PAS-RR    D: Per DHS regulation, DEVONTE completed and submitted PAS-RR to [place] Board on Aging Direct Connect via the [employer] Line.  PAS-RR confirmation # is : [id number]    I: SW spoke with patient and they are aware a PAS-RR has been submitted.  DEVONTE reviewed with patient that they may be contacted for a follow up appointment within 10 days of hospital discharge if their SNF stay is < 30 days.  Contact information for  Senior LinkAge Line was also provided.    A: patient verbalized understanding.    P: Further questions may be directed to Senior LinkAge Line at #[phone], option #4 for [facility]- staff.      Next Steps: will continue to follow    LOGAN Gutierrez

## 2024-10-15 ENCOUNTER — APPOINTMENT (OUTPATIENT)
Dept: PHYSICAL THERAPY | Facility: CLINIC | Age: 89
DRG: 521 | End: 2024-10-15
Payer: MEDICARE

## 2024-10-15 VITALS
HEIGHT: 65 IN | SYSTOLIC BLOOD PRESSURE: 119 MMHG | BODY MASS INDEX: 19.43 KG/M2 | RESPIRATION RATE: 16 BRPM | HEART RATE: 80 BPM | OXYGEN SATURATION: 99 % | DIASTOLIC BLOOD PRESSURE: 85 MMHG | TEMPERATURE: 97.6 F | WEIGHT: 116.62 LBS

## 2024-10-15 LAB
GLUCOSE BLDC GLUCOMTR-MCNC: 100 MG/DL (ref 70–99)
MAGNESIUM SERPL-MCNC: 2 MG/DL (ref 1.7–2.3)
PLATELET # BLD AUTO: 200 10E3/UL (ref 150–450)

## 2024-10-15 PROCEDURE — 85049 AUTOMATED PLATELET COUNT: CPT

## 2024-10-15 PROCEDURE — 36415 COLL VENOUS BLD VENIPUNCTURE: CPT

## 2024-10-15 PROCEDURE — 250N000013 HC RX MED GY IP 250 OP 250 PS 637: Performed by: NURSE PRACTITIONER

## 2024-10-15 PROCEDURE — 250N000013 HC RX MED GY IP 250 OP 250 PS 637: Performed by: STUDENT IN AN ORGANIZED HEALTH CARE EDUCATION/TRAINING PROGRAM

## 2024-10-15 PROCEDURE — 83735 ASSAY OF MAGNESIUM: CPT | Performed by: STUDENT IN AN ORGANIZED HEALTH CARE EDUCATION/TRAINING PROGRAM

## 2024-10-15 PROCEDURE — 250N000013 HC RX MED GY IP 250 OP 250 PS 637: Performed by: HOSPITALIST

## 2024-10-15 PROCEDURE — 99239 HOSP IP/OBS DSCHRG MGMT >30: CPT | Performed by: STUDENT IN AN ORGANIZED HEALTH CARE EDUCATION/TRAINING PROGRAM

## 2024-10-15 PROCEDURE — 250N000013 HC RX MED GY IP 250 OP 250 PS 637: Performed by: INTERNAL MEDICINE

## 2024-10-15 PROCEDURE — 97530 THERAPEUTIC ACTIVITIES: CPT | Mod: GP

## 2024-10-15 RX ORDER — METOPROLOL TARTRATE 25 MG/1
25 TABLET, FILM COATED ORAL 2 TIMES DAILY
DISCHARGE
Start: 2024-10-15 | End: 2024-11-14

## 2024-10-15 RX ORDER — FUROSEMIDE 20 MG/1
20 TABLET ORAL DAILY
DISCHARGE
Start: 2024-10-15

## 2024-10-15 RX ORDER — PREGABALIN 50 MG/1
50 CAPSULE ORAL EVERY EVENING
Qty: 5 CAPSULE | Refills: 0 | Status: SHIPPED | OUTPATIENT
Start: 2024-10-15 | End: 2024-10-20

## 2024-10-15 RX ORDER — OXYCODONE HYDROCHLORIDE 5 MG/1
2.5-5 TABLET ORAL EVERY 4 HOURS PRN
Qty: 5 TABLET | Refills: 0 | Status: SHIPPED | OUTPATIENT
Start: 2024-10-15

## 2024-10-15 RX ORDER — OXYCODONE HYDROCHLORIDE 5 MG/1
5 TABLET ORAL EVERY 4 HOURS PRN
Status: DISCONTINUED | OUTPATIENT
Start: 2024-10-15 | End: 2024-10-15 | Stop reason: HOSPADM

## 2024-10-15 RX ADMIN — FUROSEMIDE 20 MG: 20 TABLET ORAL at 08:09

## 2024-10-15 RX ADMIN — ACETAMINOPHEN 650 MG: 325 TABLET ORAL at 06:38

## 2024-10-15 RX ADMIN — APIXABAN 2.5 MG: 2.5 TABLET, FILM COATED ORAL at 08:09

## 2024-10-15 RX ADMIN — METOPROLOL TARTRATE 25 MG: 25 TABLET, FILM COATED ORAL at 08:09

## 2024-10-15 ASSESSMENT — ACTIVITIES OF DAILY LIVING (ADL)
ADLS_ACUITY_SCORE: 53
ADLS_ACUITY_SCORE: 53
ADLS_ACUITY_SCORE: 58
ADLS_ACUITY_SCORE: 53
ADLS_ACUITY_SCORE: 53
ADLS_ACUITY_SCORE: 58
ADLS_ACUITY_SCORE: 53
ADLS_ACUITY_SCORE: 58
ADLS_ACUITY_SCORE: 54
ADLS_ACUITY_SCORE: 53
ADLS_ACUITY_SCORE: 58

## 2024-10-15 NOTE — PROGRESS NOTES
Care Management Discharge Note    Discharge Date: 10/15/2024       Discharge Disposition: Transitional Care    Discharge Services: None    Discharge DME: None    Discharge Transportation: health plan transportation    Private pay costs discussed: private room/amenity fees and transportation costs    Does the patient's insurance plan have a 3 day qualifying hospital stay waiver?  No    PAS Confirmation Code: [id number]  Patient/family educated on Medicare website which has current facility and service quality ratings: yes    Education Provided on the Discharge Plan: Yes  Persons Notified of Discharge Plans: yes  Patient/Family in Agreement with the Plan: yes    Handoff Referral Completed: No, handoff not indicated or clinically appropriate    Additional Information:  Patient accepted to [facility] today. Writer confirmed that if patient needs medical transport set up, they are aware of out of pocket cost. Patient and/or family would to transport set up with  [facility]. Ride was arranged and transport is set up between 1015 and 1050 via wheelchair.     Discharge orders received for discharge today and faxed to [facility]. Writer confirmed ride time with facility, RN and family.      LOGAN Gutierrez

## 2024-10-15 NOTE — PLAN OF CARE
Physical Therapy Discharge Summary    Reason for therapy discharge:    Discharged to transitional care facility.    Progress towards therapy goal(s). See goals on Care Plan in [facility] electronic health record for goal details.  Goals partially met.  Barriers to achieving goals:   discharge from facility.    Therapy recommendation(s):    Continued therapy is recommended.  Rationale/Recommendations: Pt is below baseline and will require continued skilled PT intervention at TCU in order to progress strength, activity tolerance and independent with mobility.  PT Brief overview of current status: Assist of 2 with Kyra Ocasio          Recommendation above provided by last treating therapist.

## 2024-10-15 NOTE — PROGRESS NOTES
Date/Time 10/15/24  11:15  Diagnosis: Left Hip Steve Hemiarthroplasty  POD#: 7  Mental Status: A & Ox3, forgetful  Activity/dangle: up with 2 and SS  Diet: Regular  Pain: Tylenol  Torres/Voiding: External Catheter, incontinent   Tele/Restraints/Iso:   02/LDA: On RA, PIV SL  D/C Date: 10/15/24 to TCU at 11:15  Other Info: On Mg protocol Mg today is 2.0, dressing came of and new dressing applied,  discharge package given to the  AVS given to the son and pt. Discharge to TCU at 11:15 am.

## 2024-10-15 NOTE — DISCHARGE SUMMARY
[facility]    Hospitalist Discharge Summary       Date of Admission:  10/6/2024  Date of Discharge:  10/15/2024  Discharging Provider: Erik Belcher MD      Discharge Diagnoses     Suspected fall, unclear etiology of fall  Left femoral neck fracture, acute, displaced  Acute on chronic systolic heart failure      Follow-ups Needed After Discharge   Follow-up Appointments     Follow Up and recommended labs and tests      Please call as soon as possible to make an appointment to be seen in Dr. Titi Julian's clinic at 2 weeks postop for a recheck of your surgical   site, possible repeat x-rays, and wound care. If you are at a TCU at this   time and they have x-ray capabilities, you may complete your wound care   and x-rays at your TCU and have them send your images to Dr. Julian's   office.     Dr. Julian's care coordinator is Liat Vivas. Please contact her at   [phone] to schedule an appointment.       Dr. Julian sees patients at 2 clinic locations:  [facility]  [address] [place] [address]  [facility]   [address] , [address], [place] [address]        Please call the on-call phone number [phone] during evenings, nights   and weekends for any urgent needs. Prescription refills must be done   during business hours by calling [phone].        Follow Up and recommended labs and tests      Follow up with [facility] physician.  The following labs/tests are   recommended: Cbc and bmp          Unresulted Labs Ordered in the Past 30 Days of this Admission       No orders found from 9/6/2024 to 10/7/2024.          Hospital Course   Benita Hubbard is a 89 year old female with h/o  HTN, non ischemic Cardiomyopathy, CAD, neuropathy, history of UTI  was found on the floor in her independent apartment for an unknown period of time (found on welfare check) and was brought to the ER by EMS.  She was  found to have left femoral neck fracture and admitted on 10/6/2024 for further management.       Following admission of 10/6 she had 2 subsequent RRT's.  First RRT early a.m on 10/7 for chest pain and shortness of breath.  Extensive evaluation as outlined below.  Noted to have refractory lactic acidosis throughout the day on 10/7.  Elevated troponin with cardiomyopathy seen by cardiology.  Hyperkalemia which is subsequently improved.  Treated for potential of UTI with antibiotics started.  Given significant amount of fluids.  Labs improved with resolution of lactic acidosis on 10/8 labs.      Acute on Chronic systolic heart failure   nonischemic cardiomyopathy  Elevated troponin (type 2 MI secondary to supply/demand) tachycardia, acidosis,   Atrial Fibrillation with RVR          - LVEF was 40-45% in 2014.  Coronary angiogram showed normal coronaries.  LVEF subsequently normalized on ECHO 5/2014   - Cardiology consult with elevated lactate and troponin   - 10/7 ECHO with EF 40-45% Flattened septum with RV pressure/volume overload. RV dilated. Moderate TR, mild MS  - with RV dilation PE excluded with CT   - Discussions with cardiology and no interventions planned. >> still most likely from stress demand    -Patient was diuresed with IV Lasix and transition to oral Lasix on discharge  -For atrial fibrillation with RVR she has been on metoprolol tartrate 25 mg twice daily  -Continue Eliquis 2.5 mg twice daily which was started on during this admission  -Outpatient follow-up with cardiology             Suspected fall, unclear etiology of fall  Left femoral neck fracture, acute, displaced  CT abdomen/pelvis displaced and angulated left femoral neck with nondisplaced fracture of the left pubic bone and left inferior and superior pubic rami.   Left hip hemiarthroplasty placed.  10/8/2024  - Unclear etiology of fall.  She was found on floor.    - Patient does not remember when she fell, and also the mechanism.   -  A bruise  "in her chin was noted 3 days ago by son, patient did not remember falling then as well.    - Uses walker mostly for mobility.  - Multiple imaging studies including left shoulder x-ray, chest x-ray, pelvis and hip x-ray,   - CT head and CT C-spine completed, noted left femoral neck fracture with displacement.    - 10/7 ortho consult   - 10/8 ortho surgery planned. Aware of pelvic fractures   - 10/8 left TONY 10/8/2024  -Lovenox DVT PX PE transition to apixaban in view of A-fib.  See above.  -PT recommends TCU.  -10/10/2024, see Ortho note. \"Continue Eliquis 2.5 mg BID for atrial fibrillation and DVT prophylaxis.              Hgb stable.              Mobilize with PT/OT.               WBAT LLE with use of assistive device.                No left hip adduction past midline for 6-8 weeks postop.              Continue current pain regimen.              Dressings: Keep intact. Change if >60% saturated or peeling off.               Follow-up: 2 weeks post-op with Dr. Julian team\"  -Discharged to TCU     Acute hypoxic respiratory failure  CT no PE, effusions,   Potential Right lung base pneumonia   Few small pulmonary nodules  - Difficult to evaluate oxygen levels with finger probe, ear probe and head probe.   - When able to get what appears to be good wave forms in mid 90%  - face mask as mouth breather  - CT with no obvious PE, small effusions Left greater than right   - on ceftriaxone for UTI vs PNA initially now on zosyn, will Rx 5 days total. Follow-up US.  - ECHO with 40-45% with holding lasix until after surgery with creatinine   Few small pulmonary nodules-Outpatient follow up     Lactic acidosis: (resolved)   -Hyperkalemia: (resolved)   - At baseline potassium low 5 range and on ACE  - stopped ACE   - Lucero to alleviate urinary emptying, Lucero since removed, negative PVRs.  - monitor potassium   - Peak potassium 5.7 >> given fluids with lucero placed   - 10/8 potassium normal 4.6, 10/9/2024 at 4.8, recheck BMP in " a.m K at  4/2   -Ace has been stopped and can be restarted if potassium level stable           MAGNO mild-resolved  10/10/2024 mild bump in creatinine 1.16, before surgery 0.86.  Suspect related to contrast dye.  Patient urinating.  On antibiotics for UTI.  DC Torres, -CR on normal.    -Check bmp in a weeks time          Rhabdomyolysis mild resolved  CK mildly elevated at  748.  Suspect related to fall.  -IV hydration and follow  - Repeat 393   -       UTI  Urine culture sent   - UA positive with catheter  - CT with mild diffuse bladder wall thickening and enhancement ? Cystitis.   - started on ceftriaxone. 10/7 >> prior Urine with aerococcus so changed to zosyn.   - 10/7 changed to zosyn >> await cultures and taper as able.   - 10/8 blood cultures NGTD  -Eating well, DC IV fluids especially in view of IV shortage.  -Due to cephalosporin allergy transition to Augmentin 500 mg twice daily for 5 more doses total 5-day antibiotic course.  DC Zosyn.  Completed Augmentin.     Diffuse Gallbladder wall thickening:   - on CT imaging abdomen   - Liver profile negative.   - 10/7 RUQ ultrasound with no cholecystitis      Thrombocytopenia resolved  Mild thrombocytopenia, platelet count 112.  -Monitor  -   Compression fractures.   T5 and L3 indeterminate on CT chest this admit (prior imaging suggest prior on imaging)   - 7/2019 xray of lumbar spine with mild L3 compression fracture   - 9/2021 CT chest with age indeterminate T4, mild T5 and T6   -No complaints of back pain        cognitive impairment  Acute encephalopathy  Resolved   Unclear etiology, could be delirium   Son reports patient is forgetful and has short-term memory impairment.    - No formal diagnosis of dementia  - CT head no acute injury   - Addendum -Patient at baseline mental status  -Ortho prescribed low dose oxycodone prn for pain during mobility  Her lyrica has been resumed for neuropathic pain and this can be reevaluated as outpatient and can be stopped  if she has further confusion episodes         Constipation.  Likely multifactorial including decreased activities, decreased p.o. intake.   -Continue bowel regimen    # Family communication  Updated Son     Consultations This Hospital Stay   ORTHOPEDIC SURGERY IP CONSULT  CARE MANAGEMENT / SOCIAL WORK IP CONSULT  CARDIOLOGY IP CONSULT  PHARMACY IP CONSULT  TRAUMA SURGERY IP CONSULT  CARE MANAGEMENT / SOCIAL WORK IP CONSULT  PHYSICAL THERAPY ADULT IP CONSULT  OCCUPATIONAL THERAPY ADULT IP CONSULT  PHARMACY LIAISON FOR MEDICATION COVERAGE CONSULT  SPEECH LANGUAGE PATH ADULT IP CONSULT  PHYSICAL THERAPY ADULT IP CONSULT  OCCUPATIONAL THERAPY ADULT IP CONSULT  CARDIOLOGY IP CONSULT    Code Status   Full Code    Time Spent on this Encounter   I,Erik Belcher, personally saw the patient today and spent approximately greater than 30  minutes discharging this patient.       Erik Belcher MD  [facility]  ______________________________________________________________________    Physical Exam   Vital Signs: Temp: 97.6  F (36.4  C) Temp src: Axillary BP: 119/85 Pulse: 80   Resp: 16 SpO2: 99 % O2 Device: None (Room air)    Weight: 116 lbs 9.97 oz    Physical Exam  Cardiovascular:      Rate and Rhythm: Normal rate. Rhythm irregular.   Pulmonary:      Effort: Pulmonary effort is normal. No respiratory distress.      Breath sounds: Normal breath sounds.   Abdominal:      General: There is no distension.      Palpations: Abdomen is soft.      Tenderness: There is no abdominal tenderness.           Primary Care Physician   Benjamin Chambers    Discharge Disposition   Discharged to short-term care facility  Condition at discharge: Stable    Significant Results and Procedures   Most Recent 3 CBC's:  Recent Labs   Lab Test 10/15/24  0744 10/14/24  0910 10/13/24  0811 10/12/24  1034   WBC  --  7.8 8.4 9.4   HGB  --  12.4 12.0 12.1   MCV  --  97 95 96    218 167 145*      Most Recent 3 BMP's:  Recent Labs   Lab Test 10/15/24  0741 10/14/24  0910 10/13/24  0811 10/12/24  1134   NA  --  141 139 142   POTASSIUM  --  4.1 4.5 4.2   CHLORIDE  --  103 105 106   CO2  --  27 22 23   BUN  --  30.0* 33.9* 39.7*   CR  --  0.79 0.79 1.03*   ANIONGAP  --  11 12 13   SHORTY  --  9.0 8.9 8.8   * 108* 115* 123*     Most Recent 2 LFT's:  Recent Labs   Lab Test 10/07/24  1313 10/06/24  1158   AST 43  --    ALT 28 28   ALKPHOS 57 63   BILITOTAL 0.6 0.5     Most Recent 3 INR's:No lab results found.  Most Recent 3 Troponin's:  Recent Labs   Lab Test 09/19/21  1025   TROPONIN <0.015     Most Recent 3 BNP's:  Recent Labs   Lab Test 10/11/24  0741   NTBNPI 25,058*     Most Recent D-dimer:No lab results found.  Most Recent Cholesterol Panel:  Recent Labs   Lab Test 09/29/17  0902   CHOL 193   *   HDL 64   TRIG 70       Results for orders placed or performed during the hospital encounter of 10/06/24   Head CT w/o contrast    Narrative    EXAM: CT HEAD W/O CONTRAST  LOCATION: [facility]  DATE: 10/6/2024    INDICATION: fall head injiury. Pain.  COMPARISON: Head CT 19 September 2021]  TECHNIQUE: Routine CT Head without IV contrast. Multiplanar reformats. Dose reduction techniques were used.    FINDINGS:  INTRACRANIAL CONTENTS: No intracranial hemorrhage, extraaxial collection, or mass effect.  No CT evidence of acute infarct. Mild to moderate presumed chronic small vessel ischemic changes. Moderate generalized volume loss. No hydrocephalus. Tiny, chronic   infarcts in the cerebellar hemispheres. Skull base vascular calcifications.    VISUALIZED ORBITS/SINUSES/MASTOIDS: Prior bilateral cataract surgery. Visualized portions of the orbits are otherwise unremarkable. Chronic opacification of the left maxillary sinus with central dystrophic calcifications and chronic reactive osteitis. No   bone destruction. Mild anterior left ethmoid membrane thickening. No middle ear or  mastoid effusion.    BONES/SOFT TISSUES: No acute abnormality. Mild left TMJ arthropathy.      Impression    IMPRESSION:  1.  No CT evidence for acute intracranial process.  2.  Brain atrophy and presumed chronic microvascular ischemic changes as above.   CT Cervical Spine w/o Contrast    Narrative    EXAM: CT CERVICAL SPINE W/O CONTRAST  LOCATION: [facility]  DATE: 10/6/2024    INDICATION: fall dementia. Injury. Pain.  COMPARISON: 19 September 2021 cervical spine CT  TECHNIQUE: Routine CT Cervical Spine without IV contrast. Multiplanar reformats. Dose reduction techniques were used.    FINDINGS:  Mild endplate concavities at C7 unchanged. Upper endplate concavity at T1 is stable. Chronic T4 upper lower endplate fractures with with sclerotic bone remodeling unchanged but incompletely visualized. No acute fractures. Prevertebral soft tissues   unremarkable. No extraspinal abnormality.     Craniovertebral junction and C1-C2: Normal.    C2-C3: Very small central disc protrusion. Normal facets. No spinal canal or neural foraminal stenosis.     C3-C4: Facet DJD and mild annular disc bulge. Patent central canal and foramen.     C4-C5: 2.5 mm degenerative anterolisthesis. Facet arthropathy. Moderate right and mild left foraminal stenosis. Patent central canal.     C5-C6: Disc osteophyte. Facet DJD. Severe right and moderate left foraminal stenosis. Patent central canal.    C6-C7: 1.5 mm degenerative anterolisthesis. Disc and facet degenerative changes. Patent central canal. Mild bilateral foraminal stenosis.     C7-T1: Facet DJD. Patent central canal and foramina.       Impression    IMPRESSION:  1.  Lower cervical and thoracic residual from previous trauma. No interval change.    2.  Multilevel cervical spondylosis.    3.  No acute fractures or evidence of traumatic malalignment.   XR Pelvis w Hip Left 1 View    Narrative    EXAM: XR PELVIS AND HIP LEFT 1 VIEW  LOCATION: [facility]  DATE: 10/6/2024    INDICATION: eval for fx  COMPARISON: None.      Impression    IMPRESSION: Acute mildly displaced left femoral neck fracture. Left femoral head remains appropriately located within the acetabulum. Asymmetric contour deformity cortical irregularity at the junction of the left pubic tubercle and left superior pubic   ramus as well as along the left inferior pubic ramus, suggestive of additional fractures. Mild joint space narrowing both hips. Severe diffuse osseous demineralization. Limited evaluation of the sacrum secondary to overlying bowel gas.   XR Chest 1 View    Narrative    EXAM: XR CHEST 1 VIEW  LOCATION: [facility]  DATE: 10/6/2024    INDICATION: fall hypoixia  COMPARISON: CT of the chest 9/9/2021      Impression    IMPRESSION:     Cardiac enlargement with particular enlargement of the left ventricular heart border. Mild aortic atheromatous calcifications. Vascular pedicle with is not increased. Lucency at the left tracheobronchial angle reflects retained air in the esophagus and   suggests esophageal dysmotility.    Symmetric lung inflation. There are no focal airspace opacities. No findings to suggest interstitial lung edema. No menisci in the bases to indicate pleural effusions.    Disc space narrowing particularly of the mid thoracic vertebra and marginal osteophytes. No acute displaced rib fractures are detected.   XR Shoulder Left G/E 3 Views    Narrative    EXAM: XR SHOULDER LEFT G/E 3 VIEWS  LOCATION: [facility]  DATE: 10/6/2024    INDICATION: fall shoulder pain  COMPARISON: None.      Impression    IMPRESSION: No fracture or malalignment. Soft tissue calcination projecting over the greater tuberosity which is favored to represent calcific tendinitis. Diffuse osseous demineralization which limits evaluation for nondisplaced fractures and subtle   osseous lesions.   CT Chest (PE) Abdomen Pelvis w Contrast     Narrative    CT CHEST PE, ABDOMEN AND PELVIS WITH CONTRAST  10/7/2024 2:25 PM    CLINICAL HISTORY: Chest pain. Elevated lactate level. Recent left hip  fracture. Evaluate for pulmonary embolism.    TECHNIQUE: CT angiogram chest and routine CT abdomen pelvis with IV  contrast. Arterial phase through the chest and venous phase through  the abdomen and pelvis. 2D and 3D MIP reconstructions were preformed  by the CT technologist. Dose reduction techniques were used.   CONTRAST: 63 mL [id number]    COMPARISON: 9/18/2021.    FINDINGS:  ANGIOGRAM CHEST: No convincing evidence for pulmonary embolism.  Heterogeneous opacification within the pulmonary arteries is likely  due to admixture of opacified and nonopacified blood. There is limited  opacification of the more distal pulmonary arterial branches. No  evidence for thoracic aortic aneurysm.  The thoracic aorta is not well  opacified.     LUNGS AND PLEURA: There are small bilateral pleural effusions, larger  on the right. Patchy airspace opacity at the right lung base may be  related to atelectasis or pneumonia. No pneumothorax. A few small  pulmonary nodules in both lungs measure 0.4 cm or smaller, unchanged  and highly likely to be of benign etiology.    MEDIASTINUM/AXILLAE: No enlarged lymph nodes in the chest. No  pericardial effusion.    CORONARY ARTERY CALCIFICATION: Moderate.    HEPATOBILIARY: There is diffuse gallbladder wall enhancement. No  shadowing gallstones are seen. No hepatic masses.    PANCREAS: Normal.    SPLEEN: Normal.    ADRENAL GLANDS: Normal.    KIDNEYS/BLADDER: Torres catheter in the bladder. There is mild  thickening and diffuse enhancement of the bladder wall. No  hydronephrosis.    BOWEL: No bowel obstruction. Colonic diverticulosis. No convincing  evidence for colitis or diverticulitis. Unremarkable appendix.    PELVIC ORGANS: Hysterectomy. Small amount of nonspecific free fluid in  the pelvis.    LYMPH NODES: No enlarged lymph nodes are  identified in the abdomen or  pelvis.    VASCULATURE: Mild atherosclerotic aortoiliac calcification.    ADDITIONAL FINDINGS: None.    MUSCULOSKELETAL: Displaced and angulated acute fracture of the left  femoral neck. There are also nondisplaced fractures of the left pubic  bone and left superior and inferior pubic rami. Mild age-indeterminate  compression of the L3 vertebral body. Marked compression of the T4  vertebral body is unchanged. Marked compression of the T5 vertebral  body is new since the previous exam, age indeterminate.      Impression    IMPRESSION:  1.  No evidence for pulmonary embolism.  2.  Small bilateral pleural effusions, larger on the right.  3.  Patchy airspace opacity at the right lung base posteriorly may be  related to atelectasis or pneumonia.  4.  There is diffuse gallbladder wall enhancement. If there is  clinical suspicion for cholecystitis, gallbladder ultrasound would be  recommended.  5.  Mild diffuse bladder wall thickening and enhancement are  nonspecific, but could be related to cystitis.  6.  Age-indeterminate compressions of the T5 and L3 vertebral bodies.  7.  Displaced and angulated acute fracture of the left femoral neck,  as well as nondisplaced fractures of the left pubic bone and left  inferior and superior pubic rami.  8.  Small amount of ascites.    RICH MORRISON MD         SYSTEM ID:  [id number]   US Abdomen Limited    Narrative    EXAM: US ABDOMEN LIMITED  LOCATION: [facility]  DATE: 10/7/2024    INDICATION: Gallbladder wall enhancement on CT.  COMPARISON: 10/7/2024 1412 hours  TECHNIQUE: Limited abdominal ultrasound.    FINDINGS:  GALLBLADDER: Normal. No gallstones, wall thickening, or pericholecystic fluid. Negative sonographic Ortega's sign.    BILE DUCTS: No biliary dilatation. The common duct measures 5 mm.    LIVER: Normal parenchyma with smooth contour. No focal mass.    RIGHT KIDNEY: No hydronephrosis.    PANCREAS: The visualized  portions are normal.    Trace ascites noted. There is a small right pleural effusion.      Impression    IMPRESSION:  1.  No ultrasound evidence of acute cholecystitis.  2.  Trace ascites.  3.  Small right pleural effusion.     XR Pelvis w Hip Port Left  1 View    Narrative    EXAM: XR PELVIS AND HIP PORTABLE LEFT 1 VIEW  LOCATION: [facility]  DATE: 10/8/2024    INDICATION: Status post hip surgery.  COMPARISON: None.      Impression    IMPRESSION: Interval left total hip implant. Hardware appears well seated without acute fracture or dislocation. Bones are markedly demineralized.    Again seen are mildly displaced fractures involving the left superior and inferior pubic rami.   Echocardiogram Complete     Value    LVEF  40-45%    Narrative    [id number]  [id number]  [id number]  [id number]^LUI^EDUARDO^L     [facility]  Echocardiography Laboratory  [address] [place] [address]     Name: YOKO SU  MRN: [id number]  : 1931  Study Date: 10/07/2024 09:27 AM  Age: 89 yrs  Gender: Female  Patient Location: [facility]  Reason For Study: Acute Myocardial Infarction  Ordering Physician: EDUARDO POE  Referring Physician: Benjamin Castro  Performed By: Carmen Mason RDCS     BSA: 1.7 m2  Height: 65 in  Weight: 142 lb  HR: 123  BP: 112/81 mmHg  ______________________________________________________________________________  Procedure  Complete Portable Echo Adult. Definity (NDC #72723-006) given intravenously.  ______________________________________________________________________________  Interpretation Summary     The left ventricular cavity is small. Left ventricular systolic function is  moderately reduced. The visual ejection fraction is 40-45%.  Flattened septum is consistent with RV pressure/volume overload. There is  moderate global hypokinesia of the left ventricle.  The right ventricle is mild-moderately dilated. The right ventricular  systolic  function is moderately reduced. Consider evaluation for pulmonary embolism if  clinically appropriate.  There is moderate (2+) tricuspid regurgitation.  There is mild (1+) pulmonic valvular regurgitation.  Dilation of the inferior vena cava is present with abnormal respiratory  variation in diameter.  There is no pericardial effusion.     Findings discussed with consulting cardiologist.  ______________________________________________________________________________  Left Ventricle  The left ventricular cavity is small. Left ventricular systolic function is  moderately reduced. The visual ejection fraction is 40-45%. Grade I or early  diastolic dysfunction. Flattened septum is consistent with RV pressure/volume  overload. There is moderate global hypokinesia of the left ventricle.     Right Ventricle  The right ventricle is mildly dilated. The right ventricular systolic function  is moderately reduced.     Atria  The left atrium is mildly dilated. The right atrium is mild to moderately  dilated.     Mitral Valve  There is trace to mild mitral regurgitation.     Tricuspid Valve  There is moderate (2+) tricuspid regurgitation. The right ventricular systolic  pressure is approximated at 29.0 mmHg plus the right atrial pressure.     Aortic Valve  There is mild trileaflet aortic sclerosis. No hemodynamically significant  valvular aortic stenosis.     Pulmonic Valve  There is mild (1+) pulmonic valvular regurgitation.     Vessels  The aortic root is normal size. Dilation of the inferior vena cava is present  with abnormal respiratory variation in diameter.     Pericardium  There is no pericardial effusion.     ______________________________________________________________________________  MMode/2D Measurements & Calculations  IVSd: 1.4 cm  LVIDd: 2.9 cm  LVIDs: 2.2 cm  LVPWd: 1.2 cm  FS: 23.8 %  LV mass(C)d: 116.8 grams  LV mass(C)dI: 68.3 grams/m2     Ao root diam: 2.8 cm  LVOT diam: 1.5 cm  LVOT area: 1.9  cm2  Ao root diam index Ht(cm/m): 1.7  Ao root diam index BSA (cm/m2): 1.7  EF Biplane: 45.0 %  RWT: 0.81  TAPSE: 1.4 cm     Doppler Measurements & Calculations  MV E max clarence: 65.5 cm/sec  MV A max clarence: 59.2 cm/sec  MV E/A: 1.1  MV dec slope: 262.0 cm/sec2  MV dec time: 0.25 sec  TR max clarence: 268.5 cm/sec  TR max P.0 mmHg  E/E' av.4  Lateral E/e': 10.6  Medial E/e': 18.2  RV S Clarence: 8.3 cm/sec     ______________________________________________________________________________  Report approved by: Maxime Rucker 10/07/2024 10:28 AM             Discharge Orders      Follow-Up with Cardiology HARRIET      Follow-Up with Cardiology HARRIET      General info for SNF    Length of Stay Estimate: Short Term Care: Estimated # of Days <30  Condition at Discharge: Stable  Level of care:skilled   Rehabilitation Potential: Fair  Admission H&P remains valid and up-to-date: Yes  Recent Chemotherapy: N/A  Use Nursing Home Standing Orders: Yes     Mantoux instructions    Give two-step Mantoux (PPD) Per Facility Policy Yes     Follow Up and recommended labs and tests    Please call as soon as possible to make an appointment to be seen in Dr. Titi Julian's clinic at 2 weeks postop for a recheck of your surgical site, possible repeat x-rays, and wound care. If you are at a TCU at this time and they have x-ray capabilities, you may complete your wound care and x-rays at your TCU and have them send your images to Dr. Julian's office.     Dr. Julian's care coordinator is Liat Vivas. Please contact her at [phone] to schedule an appointment.       Dr. Julian sees patients at 2 clinic locations:  [facility]  [address] [place] [id number]  [facility]   [address] , [address], [place] [address]        Please call the on-call phone number [phone] during evenings, nights and weekends for any urgent needs. Prescription refills must be done during business hours by  calling [phone].     Reason for your hospital stay    S/p left hip hemiarthroplasty for management of a femoral neck fracture (DOS: 10/8/24) performed by Dr. Titi Julian     Wound care    Site: Left hip  Instructions:  Please leave dressing intact for 2 weeks postoperatively. Okay to change if saturated >60% or peeling. If an Aquacel or gauze/tegaderm is in place over your surgical sites, it is okay to shower without covering the dressings. If you have a soft dressing, you must securely cover your dressing while showering or sponge bathe. Absolutely no soaking or submersion. Please contact your orthopedic surgical team if persistent drainage or redness develops around the surgical site.     Additional Discharge Instructions    Pain after surgery is normal and expected.  You will have some amount of pain and swelling for several weeks after surgery.  These symptoms will improve with time.  There are several things you can do to help reduce your pain including: rest, cold compresses, elevation, and using pain medications as needed. Contact your Surgeon Team if you have pain that persists or worsens after surgery despite rest, ice, elevation, and taking your medication(s) as prescribed. Contact your Surgeon Team if you have new numbness, tingling, or weakness in your operative extremity.    Swelling and/or bruising of the surgical extremity is common and may persist for several months after surgery. In addition to frequent icing and elevation, gentle compressive support with an ACE wrap or tubigrip may help with swelling. Apply compression regularly, removing at least twice daily to perform skin checks. Contact your Surgeon Team if your swelling increases and is NOT associated with an increase in your activity level, or if your swelling increases and is associated with redness and pain.    Ice can be used to control swelling and discomfort after surgery. Place a thin towel over your operative site and apply  the ice pack overtop. Leave ice pack in place for 20 minutes, then remove for 20 minutes. Repeat this 20 minutes on/20 minutes off routine as often as tolerated.    Please contact your surgical team with any concerns for infection including increasing redness around your surgical site, pus-like drainage, fever, chills, or flu-like symptoms.     Activity - Up with assistive device     Activity - Up with nursing assistance     Weight bearing status    WBAT LLE with walker     Follow Up and recommended labs and tests    Follow up with long-term physician.  The following labs/tests are recommended: Cbc and bmp     Intake and output    Every shift     Daily weights    Call Provider for weight gain of more than 2 pounds per day or 5 pounds per week.     Physical Therapy Adult Consult    Evaluate and treat as clinically indicated.    Reason: S/p left hip hemiarthroplasty for management of a femoral neck fracture (DOS: 10/8/24) performed by Dr. Titi Julian     Occupational Therapy Adult Consult    Evaluate and treat as clinically indicated.    Reason: S/p left hip hemiarthroplasty for management of a femoral neck fracture (DOS: 10/8/24) performed by Dr. Titi Julian     Fall precautions     Hip precautions    No adduction past midline for the left hip for 6-8 weeks     Crutches DME    DME Documentation: Describe the reason for need to support medical necessity: Impaired gait status post hip surgery. I, the undersigned, certify that the above prescribed supplies are medically necessary for this patient and is both reasonable and necessary in reference to accepted standards of medical practice in the treatment of this patient's condition and is not prescribed as a convenience.     Cane DME    DME Documentation: Describe the reason for need to support medical necessity: Impaired gait status post hip surgery. I, the undersigned, certify that the above prescribed supplies are medically necessary for this patient and  is both reasonable and necessary in reference to accepted standards of medical practice in the treatment of this patient's condition and is not prescribed as a convenience.     Walker DME    DME Documentation: Describe the reason for need to support medical necessity: Impaired gait status post hip surgery. I, the undersigned, certify that the above prescribed supplies are medically necessary for this patient and is both reasonable and necessary in reference to accepted standards of medical practice in the treatment of this patient's condition and is not prescribed as a convenience.     Diet    Follow this diet upon discharge: Current Diet:Orders Placed This Encounter      Advance Diet as Tolerated: Regular Diet Adult     Zio Patch Mail Out     Discharge Medications   Current Discharge Medication List        START taking these medications    Details   apixaban ANTICOAGULANT (ELIQUIS) 2.5 MG tablet Take 1 tablet (2.5 mg) by mouth 2 times daily.    Comments: Future refills need to be through PCP or cardiology.  Associated Diagnoses: Hip fracture, left, closed, initial encounter (H)      furosemide (LASIX) 20 MG tablet Take 1 tablet (20 mg) by mouth daily.    Associated Diagnoses: Cardiomyopathy, nonischemic (H)      metoprolol tartrate (LOPRESSOR) 25 MG tablet Take 1 tablet (25 mg) by mouth 2 times daily.    Associated Diagnoses: Paroxysmal atrial fibrillation (H)      oxyCODONE (ROXICODONE) 5 MG tablet Take 0.5-1 tablets (2.5-5 mg) by mouth every 4 hours as needed for moderate to severe pain (Take 2.5 mg (1/2 tab) for pain 4-6/10, take 5 mg (1 tab) for pain 7-10/10).  Qty: 5 tablet, Refills: 0    Associated Diagnoses: Closed displaced fracture of left femoral neck (H)      polyethylene glycol (MIRALAX) 17 GM/Dose powder Take 17 g by mouth daily.    Associated Diagnoses: Hip fracture, left, closed, initial encounter (H)      senna-docusate (SENOKOT-S/PERICOLACE) 8.6-50 MG tablet Take 1 tablet by mouth 2 times daily as  needed for constipation.    Associated Diagnoses: Hip fracture, left, closed, initial encounter (H)           CONTINUE these medications which have CHANGED    Details   acetaminophen (TYLENOL) 325 MG tablet Take 3 tablets (975 mg) by mouth every 8 hours as needed for mild pain.    Associated Diagnoses: Hip fracture, left, closed, initial encounter (H)      pregabalin (LYRICA) 50 MG capsule Take 1 capsule (50 mg) by mouth every evening for 5 days. Further refills as per TCU or PCP  Qty: 5 capsule, Refills: 0    Associated Diagnoses: Neuropathy           CONTINUE these medications which have NOT CHANGED    Details   alendronate (FOSAMAX) 70 MG tablet Take 70 mg by mouth every 7 days Takes on Saturdays      calcium-vitamin D (CALTRATE) 600-400 MG-UNIT per tablet Take 1 tablet by mouth daily      mometasone (NASONEX) 50 MCG/ACT nasal spray Spray 2 sprays into both nostrils daily as needed           STOP taking these medications       lisinopril (PRINIVIL,ZESTRIL) 5 MG tablet Comments:   Reason for Stopping:         metoprolol succinate ER (TOPROL-XL) 25 MG 24 hr tablet Comments:   Reason for Stopping:             Allergies   Allergies   Allergen Reactions    Cephalexin Other (See Comments)     Dizzy and chest tightness    Ciprofloxacin Other (See Comments)     Neuropathy bottom of feet      Nitrofurantoin Other (See Comments)     Neuropathy bottom of feet    Ofloxacin GI Disturbance     Numbness    Sulfa Antibiotics      Coated tongue

## 2024-10-15 NOTE — PLAN OF CARE
Goal Outcome Evaluation:         Diagnosis: Left hip hemiarthroplasty   POD#: 7  Mental Status: A&Ox2-3 not time/place  Activity/dangle Assist 2 miladys steady   Diet: reg  Pain: tylenol   Torres/Voiding: purewick, incontinent   Tele/Restraints/Iso: NA  02/LDA: VIVIAN RAMIRES  D/C Date: TCU today   Other Info: CMS intact, dressing CDI, pills in applesauce, Mg protocol

## 2024-10-16 ENCOUNTER — ORDERS ONLY (AUTO-RELEASED) (OUTPATIENT)
Dept: MEDSURG UNIT | Facility: CLINIC | Age: 89
End: 2024-10-16
Payer: MEDICARE

## 2024-10-16 DIAGNOSIS — I48.0 PAROXYSMAL ATRIAL FIBRILLATION (H): ICD-10-CM

## 2024-12-31 ENCOUNTER — OFFICE VISIT (OUTPATIENT)
Dept: CARDIOLOGY | Facility: CLINIC | Age: 89
End: 2024-12-31
Attending: NURSE PRACTITIONER
Payer: MEDICARE

## 2024-12-31 ENCOUNTER — ORDERS ONLY (AUTO-RELEASED) (OUTPATIENT)
Dept: CARDIOLOGY | Facility: CLINIC | Age: 89
End: 2024-12-31

## 2024-12-31 VITALS
HEIGHT: 66 IN | WEIGHT: 101 LBS | OXYGEN SATURATION: 98 % | DIASTOLIC BLOOD PRESSURE: 77 MMHG | HEART RATE: 67 BPM | SYSTOLIC BLOOD PRESSURE: 115 MMHG | BODY MASS INDEX: 16.23 KG/M2

## 2024-12-31 DIAGNOSIS — I42.8 CARDIOMYOPATHY, NONISCHEMIC (H): ICD-10-CM

## 2024-12-31 DIAGNOSIS — I48.0 PAROXYSMAL ATRIAL FIBRILLATION (H): ICD-10-CM

## 2024-12-31 RX ORDER — FUROSEMIDE 20 MG/1
TABLET ORAL
Qty: 30 TABLET | Refills: 2 | Status: SHIPPED | OUTPATIENT
Start: 2024-12-31

## 2024-12-31 NOTE — PATIENT INSTRUCTIONS
Today's Plan:   1) Wear heart monitor for 1 week. This will be mailed out to you.   2) Continue with same medications.   3) I will send furosemide prescription to be taken as needed.   4) Increase food intake- especially protein intake    If you have questions or concerns please call my nurse team at [phone]    Scheduling phone number: [phone]  Reminder: Please bring in all current medications, over the counter supplements and vitamin bottles to your next appointment.    It was a pleasure seeing you today!

## 2024-12-31 NOTE — PROGRESS NOTES
Primary Cardiologist: Dr. Rosario    Reason for Visit: Hospital follow up    History of Present Illness:   Benita is a very pleasant 89 year old female with past medical history notable for nonischemic cardiomyopathy (LVEF normalized to low normal range with medical therapy; coronary angiogram 2024 showed no significant CAD), CAD (minimal nonobstructive disease noted on coronary angiogram dated 4/2014), hypertension, and CKD.     She was admitted recently after being found down in her apartment.  She suffered a left hip fracture requiring surgery.  Cardiology was consulted due to mild troponin elevation and later due to new onset of atrial fibrillation.  Her troponin elevation was felt to be demand ischemia likely stress-induced.  Her atrial fibrillation was managed with AV node blocking agent (metoprolol tartrate 25 mg BID) and Eliquis 2.5 mg twice daily with recommendations for outpatient monitor.  Echocardiogram was performed and this showed slightly lower LVEF of 40-45% with flattened septum consistent with RV pressure volume overload.  She received short course of IV diuresis and was transition to furosemide 20 mg daily.  Her RV function was also noted to be moderately reduced with mild to moderate dilation.  She had moderate tricuspid regurgitation.  Chest CT was done to rule out PE.  This was negative.  CT was notable for bilateral pleural effusions and small ascites.  She did diurese from weight of 122 pounds to 116 pounds.  She lost even further weight during TCU and her furosemide was discontinued.    She presents to clinic today with her son, stating she is doing well.  She continues to do exercises at her facility.  She has 2 very involved sons who help manage her meals and medications.  She denies peripheral edema, orthopnea, PND, shortness of breath, or significant weight gain.  She does have positional lightheadedness but denies persistent lightheadedness or presyncope.  Her hip pain is under good  control.  She does report that she will have very small breakfast and usually a cup of soup for lunch and will skip dinner.  She has lost further weight and she is currently weighing 100 pounds.    Assessment and Plan:  Shaheen is a very pleasant 89-year-old female with past medical history notable for nonischemic cardiomyopathy (recent decline in LVEF again in the setting of hip fracture and A-fib RVR), paroxysmal atrial fibrillation, minimal CAD, history of hypertension, and CKD.  She was admitted in 10/2024 after mechanical fall sustaining hip fracture.  Cardiology was consulted for troponin elevation as well as A-fib RVR.    She appears to be in euvolemic state.  We are unable to add or titrate GDMT due to low blood pressure in the setting of significant weight loss.  Jardiance would not be a good option for her either as she has history of recurrent UTIs.  I will send a prescription for furosemide for her to have to be taken as needed per our instructions.  Her vital signs are stable today.  We mainly discussed importance of increasing fluid intake especially protein.  She will work on this.  I will look into Ensure.  She never completed her Zio patch monitor after her discharge and thus we will reorder this today for her to wear for 1 week.  Depending on the results we will discuss whether to continue with Eliquis or not.  We would likely continue with metoprolol for rate control as well as for treatment of her nonischemic cardiomyopathy.  We may switch it to succinate form and move it to evening time to minimize daytime lightheadedness.  We will see her in 6 months or sooner depending on the results.  They verbalized understanding and were in agreement with the care plan today.    43 minutes spent on the date of the encounter with chart review, patient visit, care coordination, and documentation.    The longitudinal plan of care for the diagnose(s)/condition(s) as documented were addressed during this visit. Due  to the added complexity in care, I will continue to support Benita Hubbard  in the subsequent management and with ongoing continuity of care.     This note was completed in part using Dragon voice recognition software. Although reviewed after completion, some word and grammatical errors may occur.    Orders this Visit:  Orders Placed This Encounter   Procedures    Follow-Up with Cardiology HARRIET     Orders Placed This Encounter   Medications    apixaban ANTICOAGULANT (ELIQUIS) 2.5 MG tablet     Sig: Take 2.5 mg by mouth 2 times daily.    furosemide (LASIX) 20 MG tablet     Sig: Take it as needed based on cardiology clinic's recommendations     Dispense:  30 tablet     Refill:  2     Medications Discontinued During This Encounter   Medication Reason    furosemide (LASIX) 20 MG tablet Reorder (No AVS)         Encounter Diagnoses   Name Primary?    Cardiomyopathy, nonischemic (H)     Paroxysmal atrial fibrillation (H)        CURRENT MEDICATIONS:  Current Outpatient Medications   Medication Sig Dispense Refill    acetaminophen (TYLENOL) 325 MG tablet Take 3 tablets (975 mg) by mouth every 8 hours as needed for mild pain.      apixaban ANTICOAGULANT (ELIQUIS) 2.5 MG tablet Take 2.5 mg by mouth 2 times daily.      calcium-vitamin D (CALTRATE) 600-400 MG-UNIT per tablet Take 1 tablet by mouth daily      furosemide (LASIX) 20 MG tablet Take it as needed based on cardiology clinic's recommendations 30 tablet 2    metoprolol tartrate (LOPRESSOR) 25 MG tablet Take 1 tablet (25 mg) by mouth 2 times daily.      alendronate (FOSAMAX) 70 MG tablet Take 70 mg by mouth every 7 days Takes on Saturdays (Patient not taking: Reported on 12/31/2024)      mometasone (NASONEX) 50 MCG/ACT nasal spray Spray 2 sprays into both nostrils daily as needed (Patient not taking: Reported on 12/31/2024)      oxyCODONE (ROXICODONE) 5 MG tablet Take 0.5-1 tablets (2.5-5 mg) by mouth every 4 hours as needed for moderate to severe pain (Take 2.5 mg (1/2  "tab) for pain 4-6/10, take 5 mg (1 tab) for pain 7-10/10). (Patient not taking: Reported on 12/31/2024) 5 tablet 0    polyethylene glycol (MIRALAX) 17 GM/Dose powder Take 17 g by mouth daily. (Patient not taking: Reported on 12/31/2024)      pregabalin (LYRICA) 50 MG capsule Take 1 capsule (50 mg) by mouth every evening for 5 days. Further refills as per TCU or PCP (Patient not taking: Reported on 12/31/2024) 5 capsule 0    senna-docusate (SENOKOT-S/PERICOLACE) 8.6-50 MG tablet Take 1 tablet by mouth 2 times daily as needed for constipation. 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)     Cardiomyopathy, nonischemic (H)     Coronary artery disease     Diverticulitis of colon     Hypertension     Neuropathy     Osteoporosis     Palmar plantar dysesthesia     Small fiber neuropathy     UTI (lower urinary tract infection)        PAST SURGICAL HISTORY:  Past Surgical History:   Procedure Laterality Date    CORONARY ANGIOGRAPHY ADULT ORDER  4/14/14    normal Coronary arteries    HYSTERECTOMY, PAP NO LONGER INDICATED      ovaries still in place    OPEN REDUCTION INTERNAL FIXATION HIP BIPOLAR Left 10/8/2024    Procedure: HIP HEMIARTHROPLASTY;  Surgeon: Titi Julian MD;  Location:  OR    ORTHOPEDIC SURGERY      Left hand \"hard lumps\" excised    SEPTOPLASTY      SURGICAL HISTORY OF -       vein stripping right leg       FAMILY HISTORY:  Family History   Problem Relation Age of Onset    Cerebrovascular Disease Mother     Cancer - colorectal Father     Heart Disease No family " hx of        SOCIAL HISTORY:  Social History     Socioeconomic History    Marital status: Single     Spouse name: None    Number of children: None    Years of education: None    Highest education level: None   Tobacco Use    Smoking status: Former     Current packs/day: 0.00     Types: Cigarettes     Quit date: 1969     Years since quittin.4    Smokeless tobacco: Never    Tobacco comments:     light smoker--1-2 cigarettes per day   Substance and Sexual Activity    Alcohol use: Yes     Comment: rare    Drug use: No   Other Topics Concern    Parent/sibling w/ CABG, MI or angioplasty before 65F 55M? No    Special Diet No    Exercise Yes     Social Drivers of Health     Financial Resource Strain: Low Risk  (10/8/2024)    Financial Resource Strain     Within the past 12 months, have you or your family members you live with been unable to get utilities (heat, electricity) when it was really needed?: No   Food Insecurity: Low Risk  (10/8/2024)    Food Insecurity     Within the past 12 months, did you worry that your food would run out before you got money to buy more?: No     Within the past 12 months, did the food you bought just not last and you didn t have money to get more?: No   Transportation Needs: Low Risk  (10/8/2024)    Transportation Needs     Within the past 12 months, has lack of transportation kept you from medical appointments, getting your medicines, non-medical meetings or appointments, work, or from getting things that you need?: No    Received from [facility]    Social Connections   Interpersonal Safety: Low Risk  (10/8/2024)    Interpersonal Safety     Do you feel physically and emotionally safe where you currently live?: Yes     Within the past 12 months, have you been hit, slapped, kicked or otherwise physically hurt by someone?: No     Within the past 12 months, have you been humiliated or emotionally abused in other ways by your partner or ex-partner?: No  "  Housing Stability: Low Risk  (10/8/2024)    Housing Stability     Do you have housing? : Yes     Are you worried about losing your housing?: No       Review of Systems:  Skin:  Negative     Eyes:  Positive for glasses  ENT:  Negative    Respiratory:  Negative    Cardiovascular:    Positive for, lightheadedness  Gastroenterology: Negative    Genitourinary:  Negative    Musculoskeletal:  Negative    Neurologic:  Positive for numbness or tingling of feet  Psychiatric:  Negative    Heme/Lymph/Imm:  Negative    Endocrine:  Negative      Physical Exam:  Vitals: /77 (BP Location: Left arm, Patient Position: Sitting)   Pulse 67   Ht 1.676 m (5' 6\")   Wt 45.8 kg (101 lb)   SpO2 98%   BMI 16.30 kg/m       GEN:  NAD  NECK: No JVD  C/V:  Regular rate and rhythm, no murmur, rub or gallop.  RESP: Clear to auscultation bilaterally without wheezing, rales, or rhonchi.  GI: Abdomen soft, nontender, nondistended.   EXTREM: Trace left pitting LE edema.  No right pitting edema  NEURO: Alert and oriented, cooperative. No obvious focal deficits.   PSYCH: Normal affect.  SKIN: Warm and dry.       Recent Lab Results:  LIPID RESULTS:  Lab Results   Component Value Date    CHOL 193 09/29/2017    HDL 64 09/29/2017     (H) 09/29/2017    TRIG 70 09/29/2017    CHOLHDLRATIO 2.5 04/14/2014       LIVER ENZYME RESULTS:  Lab Results   Component Value Date    AST 43 10/07/2024    AST 35 04/14/2014    ALT 28 10/07/2024    ALT <5 (L) 09/29/2017       CBC RESULTS:  Lab Results   Component Value Date    WBC 7.8 10/14/2024    WBC 5.6 12/01/2015    RBC 4.07 10/14/2024    RBC 4.05 12/01/2015    HGB 12.4 10/14/2024    HGB 12.8 12/01/2015    HCT 39.4 10/14/2024    HCT 38.9 12/01/2015    MCV 97 10/14/2024    MCV 96 12/01/2015    MCH 30.5 10/14/2024    MCH 31.6 12/01/2015    MCHC 31.5 10/14/2024    MCHC 32.9 12/01/2015    RDW 16.0 (H) 10/14/2024    RDW 13.7 12/01/2015     10/15/2024     12/01/2015     04/14/2014       BMP " "RESULTS:  Lab Results   Component Value Date     10/14/2024     (L) 09/29/2017    POTASSIUM 4.1 10/14/2024    POTASSIUM 4.4 09/20/2021    POTASSIUM 4.6 09/29/2017    CHLORIDE 103 10/14/2024    CHLORIDE 100 09/20/2021    CHLORIDE 98 09/29/2017    CO2 27 10/14/2024    CO2 29 09/20/2021    CO2 32 (H) 09/29/2017    ANIONGAP 11 10/14/2024    ANIONGAP 4 09/20/2021    ANIONGAP 9.6 09/29/2017     (H) 10/15/2024    GLC 77 09/20/2021    GLC 94 09/29/2017    BUN 30.0 (H) 10/14/2024    BUN 19 09/20/2021    BUN 20 09/29/2017    CR 0.79 10/14/2024    CR 1.00 09/29/2017    GFRESTIMATED 69 10/14/2024    GFRESTIMATED 53 (L) 09/29/2017    GFRESTBLACK 64 09/29/2017    SHORTY 9.0 10/14/2024    SHORTY 8.9 09/29/2017        A1C RESULTS:  No results found for: \"A1C\"    INR RESULTS:  No results found for: \"INR\"          Beryl Alvarado PA-C  December 31, 2024     "

## 2024-12-31 NOTE — LETTER
12/31/2024    Benjamin Chambers MD  [address] [address]    RE: Benita Hubbard       Dear Colleague,     I had the pleasure of seeing Benita Hubbard in the [facility].  Primary Cardiologist: Dr. Rosario    Reason for Visit: Hospital follow up    History of Present Illness:   Benita is a very pleasant 89 year old female with past medical history notable for nonischemic cardiomyopathy (LVEF normalized to low normal range with medical therapy; coronary angiogram 2024 showed no significant CAD), CAD (minimal nonobstructive disease noted on coronary angiogram dated 4/2014), hypertension, and CKD.     She was admitted recently after being found down in her apartment.  She suffered a left hip fracture requiring surgery.  Cardiology was consulted due to mild troponin elevation and later due to new onset of atrial fibrillation.  Her troponin elevation was felt to be demand ischemia likely stress-induced.  Her atrial fibrillation was managed with AV node blocking agent (metoprolol tartrate 25 mg BID) and Eliquis 2.5 mg twice daily with recommendations for outpatient monitor.  Echocardiogram was performed and this showed slightly lower LVEF of 40-45% with flattened septum consistent with RV pressure volume overload.  She received short course of IV diuresis and was transition to furosemide 20 mg daily.  Her RV function was also noted to be moderately reduced with mild to moderate dilation.  She had moderate tricuspid regurgitation.  Chest CT was done to rule out PE.  This was negative.  CT was notable for bilateral pleural effusions and small ascites.  She did diurese from weight of 122 pounds to 116 pounds.  She lost even further weight during TCU and her furosemide was discontinued.    She presents to clinic today with her son, stating she is doing well.  She continues to do exercises at her facility.  She has 2 very involved sons who help manage  her meals and medications.  She denies peripheral edema, orthopnea, PND, shortness of breath, or significant weight gain.  She does have positional lightheadedness but denies persistent lightheadedness or presyncope.  Her hip pain is under good control.  She does report that she will have very small breakfast and usually a cup of soup for lunch and will skip dinner.  She has lost further weight and she is currently weighing 100 pounds.    Assessment and Plan:  Shaheen is a very pleasant 89-year-old female with past medical history notable for nonischemic cardiomyopathy (recent decline in LVEF again in the setting of hip fracture and A-fib RVR), paroxysmal atrial fibrillation, minimal CAD, history of hypertension, and CKD.  She was admitted in 10/2024 after mechanical fall sustaining hip fracture.  Cardiology was consulted for troponin elevation as well as A-fib RVR.    She appears to be in euvolemic state.  We are unable to add or titrate GDMT due to low blood pressure in the setting of significant weight loss.  Jardiance would not be a good option for her either as she has history of recurrent UTIs.  I will send a prescription for furosemide for her to have to be taken as needed per our instructions.  Her vital signs are stable today.  We mainly discussed importance of increasing fluid intake especially protein.  She will work on this.  I will look into Ensure.  She never completed her Zio patch monitor after her discharge and thus we will reorder this today for her to wear for 1 week.  Depending on the results we will discuss whether to continue with Eliquis or not.  We would likely continue with metoprolol for rate control as well as for treatment of her nonischemic cardiomyopathy.  We may switch it to succinate form and move it to evening time to minimize daytime lightheadedness.  We will see her in 6 months or sooner depending on the results.  They verbalized understanding and were in agreement with the care plan  today.    43 minutes spent on the date of the encounter with chart review, patient visit, care coordination, and documentation.    The longitudinal plan of care for the diagnose(s)/condition(s) as documented were addressed during this visit. Due to the added complexity in care, I will continue to support Benita Hubbard  in the subsequent management and with ongoing continuity of care.     This note was completed in part using Dragon voice recognition software. Although reviewed after completion, some word and grammatical errors may occur.    Orders this Visit:  Orders Placed This Encounter   Procedures     Follow-Up with Cardiology HARRIET     Orders Placed This Encounter   Medications     apixaban ANTICOAGULANT (ELIQUIS) 2.5 MG tablet     Sig: Take 2.5 mg by mouth 2 times daily.     furosemide (LASIX) 20 MG tablet     Sig: Take it as needed based on cardiology clinic's recommendations     Dispense:  30 tablet     Refill:  2     Medications Discontinued During This Encounter   Medication Reason     furosemide (LASIX) 20 MG tablet Reorder (No AVS)         Encounter Diagnoses   Name Primary?     Cardiomyopathy, nonischemic (H)      Paroxysmal atrial fibrillation (H)        CURRENT MEDICATIONS:  Current Outpatient Medications   Medication Sig Dispense Refill     acetaminophen (TYLENOL) 325 MG tablet Take 3 tablets (975 mg) by mouth every 8 hours as needed for mild pain.       apixaban ANTICOAGULANT (ELIQUIS) 2.5 MG tablet Take 2.5 mg by mouth 2 times daily.       calcium-vitamin D (CALTRATE) 600-400 MG-UNIT per tablet Take 1 tablet by mouth daily       furosemide (LASIX) 20 MG tablet Take it as needed based on cardiology clinic's recommendations 30 tablet 2     metoprolol tartrate (LOPRESSOR) 25 MG tablet Take 1 tablet (25 mg) by mouth 2 times daily.       alendronate (FOSAMAX) 70 MG tablet Take 70 mg by mouth every 7 days Takes on Saturdays (Patient not taking: Reported on 12/31/2024)       mometasone (NASONEX) 50  MCG/ACT nasal spray Spray 2 sprays into both nostrils daily as needed (Patient not taking: Reported on 12/31/2024)       oxyCODONE (ROXICODONE) 5 MG tablet Take 0.5-1 tablets (2.5-5 mg) by mouth every 4 hours as needed for moderate to severe pain (Take 2.5 mg (1/2 tab) for pain 4-6/10, take 5 mg (1 tab) for pain 7-10/10). (Patient not taking: Reported on 12/31/2024) 5 tablet 0     polyethylene glycol (MIRALAX) 17 GM/Dose powder Take 17 g by mouth daily. (Patient not taking: Reported on 12/31/2024)       pregabalin (LYRICA) 50 MG capsule Take 1 capsule (50 mg) by mouth every evening for 5 days. Further refills as per TCU or PCP (Patient not taking: Reported on 12/31/2024) 5 capsule 0     senna-docusate (SENOKOT-S/PERICOLACE) 8.6-50 MG tablet Take 1 tablet by mouth 2 times daily as needed for constipation. (Patient not taking: Reported on 12/31/2024)         ALLERGIES     Allergies   Allergen Reactions     Cephalexin Other (See Comments)     Dizzy and chest tightness     Ciprofloxacin Other (See Comments)     Neuropathy bottom of feet       Nitrofurantoin Other (See Comments)     Neuropathy bottom of feet     Ofloxacin GI Disturbance     Numbness     Sulfa Antibiotics      Coated tongue       PAST MEDICAL HISTORY:  Past Medical History:   Diagnosis Date     Cardiomyopathy due to hypertension (H)      Cardiomyopathy, nonischemic (H)      Coronary artery disease      Diverticulitis of colon      Hypertension      Neuropathy      Osteoporosis      Palmar plantar dysesthesia      Small fiber neuropathy      UTI (lower urinary tract infection)        PAST SURGICAL HISTORY:  Past Surgical History:   Procedure Laterality Date     CORONARY ANGIOGRAPHY ADULT ORDER  4/14/14    normal Coronary arteries     HYSTERECTOMY, PAP NO LONGER INDICATED      ovaries still in place     OPEN REDUCTION INTERNAL FIXATION HIP BIPOLAR Left 10/8/2024    Procedure: HIP HEMIARTHROPLASTY;  Surgeon: Titi Julian MD;  Location:  OR      "ORTHOPEDIC SURGERY      Left hand \"hard lumps\" excised     SEPTOPLASTY       SURGICAL HISTORY OF -       vein stripping right leg       FAMILY HISTORY:  Family History   Problem Relation Age of Onset     Cerebrovascular Disease Mother      Cancer - colorectal Father      Heart Disease No family hx of        SOCIAL HISTORY:  Social History     Socioeconomic History     Marital status: Single     Spouse name: None     Number of children: None     Years of education: None     Highest education level: None   Tobacco Use     Smoking status: Former     Current packs/day: 0.00     Types: Cigarettes     Quit date: 1969     Years since quittin.4     Smokeless tobacco: Never     Tobacco comments:     light smoker--1-2 cigarettes per day   Substance and Sexual Activity     Alcohol use: Yes     Comment: rare     Drug use: No   Other Topics Concern     Parent/sibling w/ CABG, MI or angioplasty before 65F 55M? No     Special Diet No     Exercise Yes     Social Drivers of Health     Financial Resource Strain: Low Risk  (10/8/2024)    Financial Resource Strain      Within the past 12 months, have you or your family members you live with been unable to get utilities (heat, electricity) when it was really needed?: No   Food Insecurity: Low Risk  (10/8/2024)    Food Insecurity      Within the past 12 months, did you worry that your food would run out before you got money to buy more?: No      Within the past 12 months, did the food you bought just not last and you didn t have money to get more?: No   Transportation Needs: Low Risk  (10/8/2024)    Transportation Needs      Within the past 12 months, has lack of transportation kept you from medical appointments, getting your medicines, non-medical meetings or appointments, work, or from getting things that you need?: No    [facility]    Social Connections   Interpersonal Safety: Low Risk  (10/8/2024)    Interpersonal Safety      " "Do you feel physically and emotionally safe where you currently live?: Yes      Within the past 12 months, have you been hit, slapped, kicked or otherwise physically hurt by someone?: No      Within the past 12 months, have you been humiliated or emotionally abused in other ways by your partner or ex-partner?: No   Housing Stability: Low Risk  (10/8/2024)    Housing Stability      Do you have housing? : Yes      Are you worried about losing your housing?: No       Review of Systems:  Skin:  Negative     Eyes:  Positive for glasses  ENT:  Negative    Respiratory:  Negative    Cardiovascular:    Positive for, lightheadedness  Gastroenterology: Negative    Genitourinary:  Negative    Musculoskeletal:  Negative    Neurologic:  Positive for numbness or tingling of feet  Psychiatric:  Negative    Heme/Lymph/Imm:  Negative    Endocrine:  Negative      Physical Exam:  Vitals: /77 (BP Location: Left arm, Patient Position: Sitting)   Pulse 67   Ht 1.676 m (5' 6\")   Wt 45.8 kg (101 lb)   SpO2 98%   BMI 16.30 kg/m       GEN:  NAD  NECK: No JVD  C/V:  Regular rate and rhythm, no murmur, rub or gallop.  RESP: Clear to auscultation bilaterally without wheezing, rales, or rhonchi.  GI: Abdomen soft, nontender, nondistended.   EXTREM: Trace left pitting LE edema.  No right pitting edema  NEURO: Alert and oriented, cooperative. No obvious focal deficits.   PSYCH: Normal affect.  SKIN: Warm and dry.       Recent Lab Results:  LIPID RESULTS:  Lab Results   Component Value Date    CHOL 193 09/29/2017    HDL 64 09/29/2017     (H) 09/29/2017    TRIG 70 09/29/2017    CHOLHDLRATIO 2.5 04/14/2014       LIVER ENZYME RESULTS:  Lab Results   Component Value Date    AST 43 10/07/2024    AST 35 04/14/2014    ALT 28 10/07/2024    ALT <5 (L) 09/29/2017       CBC RESULTS:  Lab Results   Component Value Date    WBC 7.8 10/14/2024    WBC 5.6 12/01/2015    RBC 4.07 10/14/2024    RBC 4.05 12/01/2015    HGB 12.4 10/14/2024    HGB 12.8 " "12/01/2015    HCT 39.4 10/14/2024    HCT 38.9 12/01/2015    MCV 97 10/14/2024    MCV 96 12/01/2015    MCH 30.5 10/14/2024    MCH 31.6 12/01/2015    MCHC 31.5 10/14/2024    MCHC 32.9 12/01/2015    RDW 16.0 (H) 10/14/2024    RDW 13.7 12/01/2015     10/15/2024     12/01/2015     04/14/2014       BMP RESULTS:  Lab Results   Component Value Date     10/14/2024     (L) 09/29/2017    POTASSIUM 4.1 10/14/2024    POTASSIUM 4.4 09/20/2021    POTASSIUM 4.6 09/29/2017    CHLORIDE 103 10/14/2024    CHLORIDE 100 09/20/2021    CHLORIDE 98 09/29/2017    CO2 27 10/14/2024    CO2 29 09/20/2021    CO2 32 (H) 09/29/2017    ANIONGAP 11 10/14/2024    ANIONGAP 4 09/20/2021    ANIONGAP 9.6 09/29/2017     (H) 10/15/2024    GLC 77 09/20/2021    GLC 94 09/29/2017    BUN 30.0 (H) 10/14/2024    BUN 19 09/20/2021    BUN 20 09/29/2017    CR 0.79 10/14/2024    CR 1.00 09/29/2017    GFRESTIMATED 69 10/14/2024    GFRESTIMATED 53 (L) 09/29/2017    GFRESTBLACK 64 09/29/2017    SHORTY 9.0 10/14/2024    SHORTY 8.9 09/29/2017        A1C RESULTS:  No results found for: \"A1C\"    INR RESULTS:  No results found for: \"INR\"          Beryl Alvarado PA-C  December 31, 2024       Thank you for allowing me to participate in the care of your patient.      Sincerely,     Beryl Alvarado PA-C     [facility]  cc:   Lisa Velasquez, CNP  [address] [place] [address]      "

## 2025-01-06 ENCOUNTER — CARE COORDINATION (OUTPATIENT)
Dept: CARDIOLOGY | Facility: CLINIC | Age: OVER 89
End: 2025-01-06

## 2025-01-06 DIAGNOSIS — I48.0 PAROXYSMAL ATRIAL FIBRILLATION (H): Primary | ICD-10-CM

## 2025-01-06 NOTE — PROGRESS NOTES
Pt's son sergio left a message stating pt is having issues putting on her ziopatch. He wants to know if pt can have it placed in clinic. I was told by my manager pt can be scheduled for a courtesy nurse only visit to have it placed. I left a message with pt's son with an update. I will have scheduling call him to set it up. Jerilyn LISA January 6, 2025, 3:40 PM

## 2025-02-25 ENCOUNTER — CARE COORDINATION (OUTPATIENT)
Dept: CARDIOLOGY | Facility: CLINIC | Age: OVER 89
End: 2025-02-25
Payer: MEDICARE

## 2025-02-25 NOTE — PROGRESS NOTES
RN called pt's son Papito with zio patch results/recommendations per Beryl MOTTA. Answered pt's son questions confirmed pt to continue cardiac meds without changes at this time per recommendations from Beryl. Advised the call us as needed for any new cardiac concerns that may develop. Fabiana Michelle RN on 2/25/2025 at 2:44 PM      Beryl Alvarado PA-C  [facility] 71 hour ago (1:34 PM)       Monitor looks good. No additional changes.     Conclusion    Patient had a min HR of 52 bpm, max HR of 169 bpm, and avg HR of 75 bpm. Predominant underlying rhythm was Sinus Rhythm. 1 run of Ventricular Tachycardia occurred lasting 6 beats with a max rate of 119 bpm (avg 105 bpm). 114 Supraventricular Tachycardia runs occurred, the run with the fastest interval lasting 4 beats with a max rate of 169 bpm, the longest lasting 20 beats with an avg rate of 111 bpm. Isolated SVEs were occasional (1.5%, 13805), SVE Couplets were rare (<1.0%, 419), and SVE Triplets were rare (<1.0%, 103). Isolated VEs were rare (<1.0%), VE Couplets were rare (<1.0%), and no VE Triplets were present.     Agreed with findings  No symptoms reported  No AFib  Monitoring period: 6-day

## 2025-02-25 NOTE — PROGRESS NOTES
Pt's son Papito left  inquiring on 7 day zio patch results for pt.     Ordered post OV 12/31/24 w/the following plan per that note:     She never completed her Zio patch monitor after her discharge and thus we will reorder this today for her to wear for 1 week. Depending on the results we will discuss whether to continue with Eliquis or not. We would likely continue with metoprolol for rate control as well as for treatment of her nonischemic cardiomyopathy. We may switch it to succinate form and move it to evening time to minimize daytime lightheadedness. We will see her in 6 months or sooner depending on the results.     Will route to [facility] for recommendations. Fabiana Michelle RN on 2/25/2025 at 11:26 AM      Results  ZIO PATCH 3-7 DAYS (additional cost to patient) (Order [id number])  ZIO PATCH 3-7 DAYS INTERPRETATION  Conclusion    Patient had a min HR of 52 bpm, max HR of 169 bpm, and avg HR of 75 bpm. Predominant underlying rhythm was Sinus Rhythm. 1 run of Ventricular Tachycardia occurred lasting 6 beats with a max rate of 119 bpm (avg 105 bpm). 114 Supraventricular Tachycardia runs occurred, the run with the fastest interval lasting 4 beats with a max rate of 169 bpm, the longest lasting 20 beats with an avg rate of 111 bpm. Isolated SVEs were occasional (1.5%, 42104), SVE Couplets were rare (<1.0%, 419), and SVE Triplets were rare (<1.0%, 103). Isolated VEs were rare (<1.0%), VE Couplets were rare (<1.0%), and no VE Triplets were present.     Agreed with findings  No symptoms reported  No AFib  Monitoring period: 6-day

## 2025-08-16 ENCOUNTER — HEALTH MAINTENANCE LETTER (OUTPATIENT)
Age: OVER 89
End: 2025-08-16

## (undated) DEVICE — SU VICRYL 0 CT-1 27" UND J260H

## (undated) DEVICE — GLOVE BIOGEL PI SZ 6.0 40860

## (undated) DEVICE — MANIFOLD NEPTUNE 4 PORT 700-20

## (undated) DEVICE — SU ETHIBOND 1 CTX 30" X865H

## (undated) DEVICE — SU VICRYL 1 CT 36" J959H

## (undated) DEVICE — SOL WATER IRRIG 1000ML BOTTLE 2F7114

## (undated) DEVICE — DRAPE CONVERTORS U-DRAPE 60X72" 8476

## (undated) DEVICE — BONE CEMENT BIOPREP FEMORAL PREP PLUG INSERTER 0206-710-000

## (undated) DEVICE — Device

## (undated) DEVICE — PAD FOAM MCGUIRE KIT 0814-0150

## (undated) DEVICE — SU VICRYL 0 CT-1 27" J340H

## (undated) DEVICE — DRSG TEGADERM 4X10" 1627

## (undated) DEVICE — SU MONOCRYL 3-0 PS-2 27" Y427H

## (undated) DEVICE — SU ETHIBOND 5 V-37 4X30" MB66G

## (undated) DEVICE — SU MONOCRYL 2-0 CT-1 36" UND Y945H

## (undated) DEVICE — HOOD SURG T7PLUS PEEL AWAY FACE SHIELD STRL LF 0416-801-100

## (undated) DEVICE — DECANTER BAG 2002S

## (undated) DEVICE — SPONGE BONE WICK FEMORAL

## (undated) DEVICE — GLOVE BIOGEL PI MICRO INDICATOR UNDERGLOVE SZ 7.5 48975

## (undated) DEVICE — DRAPE SPLIT SHEET 77X108 REINFORCED 29436

## (undated) DEVICE — GLOVE BIOGEL PI MICRO SZ 7.5 48575

## (undated) DEVICE — DRAPE IOBAN INCISE 36X23" 6651EZ

## (undated) DEVICE — SOLUTION WOUND CLEANSING 3/4OZ 10% PVP EA-L3011FB-50

## (undated) DEVICE — DRSG AQUACEL AG 3.5X9.75" HYDROFIBER 412011

## (undated) DEVICE — GLOVE BIOGEL PI MICRO INDICATOR UNDERGLOVE SZ 6.5 48965

## (undated) DEVICE — DRSG TEGADERM 4X4 3/4" 1626

## (undated) DEVICE — PACK TOTAL HIP W/POUCH SOP15HPFSM

## (undated) DEVICE — LINEN TOWEL PACK X5 5464

## (undated) DEVICE — BLADE SAW SAGITTAL STRK 18X90X1.19MM HD SYS 6 6118-119-090

## (undated) DEVICE — NDL SPINAL 18GA 3.5" 405184

## (undated) RX ORDER — PIPERACILLIN SODIUM, TAZOBACTAM SODIUM 3; .375 G/15ML; G/15ML
INJECTION, POWDER, LYOPHILIZED, FOR SOLUTION INTRAVENOUS
Status: DISPENSED
Start: 2024-10-08

## (undated) RX ORDER — DEXAMETHASONE SODIUM PHOSPHATE 4 MG/ML
INJECTION, SOLUTION INTRA-ARTICULAR; INTRALESIONAL; INTRAMUSCULAR; INTRAVENOUS; SOFT TISSUE
Status: DISPENSED
Start: 2024-10-08

## (undated) RX ORDER — PROPOFOL 10 MG/ML
INJECTION, EMULSION INTRAVENOUS
Status: DISPENSED
Start: 2024-10-08

## (undated) RX ORDER — VANCOMYCIN HYDROCHLORIDE 1 G/20ML
INJECTION, POWDER, LYOPHILIZED, FOR SOLUTION INTRAVENOUS
Status: DISPENSED
Start: 2024-10-08

## (undated) RX ORDER — VASOPRESSIN 20 U/ML
INJECTION PARENTERAL
Status: DISPENSED
Start: 2024-10-08

## (undated) RX ORDER — ONDANSETRON 2 MG/ML
INJECTION INTRAMUSCULAR; INTRAVENOUS
Status: DISPENSED
Start: 2024-10-08

## (undated) RX ORDER — FENTANYL CITRATE 50 UG/ML
INJECTION, SOLUTION INTRAMUSCULAR; INTRAVENOUS
Status: DISPENSED
Start: 2024-10-08